# Patient Record
Sex: FEMALE | Race: WHITE | Employment: OTHER | URBAN - METROPOLITAN AREA
[De-identification: names, ages, dates, MRNs, and addresses within clinical notes are randomized per-mention and may not be internally consistent; named-entity substitution may affect disease eponyms.]

---

## 2017-12-05 ENCOUNTER — APPOINTMENT (OUTPATIENT)
Dept: CT IMAGING | Age: 47
End: 2017-12-05
Payer: COMMERCIAL

## 2017-12-05 ENCOUNTER — HOSPITAL ENCOUNTER (EMERGENCY)
Age: 47
Discharge: HOME | End: 2017-12-05
Attending: EMERGENCY MEDICINE
Payer: COMMERCIAL

## 2017-12-05 VITALS
TEMPERATURE: 97 F | SYSTOLIC BLOOD PRESSURE: 175 MMHG | DIASTOLIC BLOOD PRESSURE: 103 MMHG | WEIGHT: 293 LBS | OXYGEN SATURATION: 96 % | HEART RATE: 89 BPM | BODY MASS INDEX: 44.41 KG/M2 | HEIGHT: 68 IN | RESPIRATION RATE: 18 BRPM

## 2017-12-05 DIAGNOSIS — G89.29 CHRONIC LOW BACK PAIN, UNSPECIFIED BACK PAIN LATERALITY, WITH SCIATICA PRESENCE UNSPECIFIED: ICD-10-CM

## 2017-12-05 DIAGNOSIS — M54.5 CHRONIC LOW BACK PAIN, UNSPECIFIED BACK PAIN LATERALITY, WITH SCIATICA PRESENCE UNSPECIFIED: ICD-10-CM

## 2017-12-05 DIAGNOSIS — M62.838 MUSCLE SPASMS OF BOTH LOWER EXTREMITIES: Primary | ICD-10-CM

## 2017-12-05 LAB
ABSOLUTE EOS #: 3.39 K/UL (ref 0–0.4)
ABSOLUTE IMMATURE GRANULOCYTE: 0 K/UL (ref 0–0.3)
ABSOLUTE LYMPH #: 3.61 K/UL (ref 1–4.8)
ABSOLUTE MONO #: 0.57 K/UL (ref 0.1–0.8)
ANION GAP SERPL CALCULATED.3IONS-SCNC: 9 MMOL/L (ref 9–17)
BASOPHILS # BLD: 2 % (ref 0–2)
BASOPHILS ABSOLUTE: 0.23 K/UL (ref 0–0.2)
BUN BLDV-MCNC: 6 MG/DL (ref 6–20)
BUN/CREAT BLD: NORMAL (ref 9–20)
CALCIUM SERPL-MCNC: 8.8 MG/DL (ref 8.6–10.4)
CHLORIDE BLD-SCNC: 103 MMOL/L (ref 98–107)
CO2: 25 MMOL/L (ref 20–31)
CREAT SERPL-MCNC: 0.61 MG/DL (ref 0.5–0.9)
DIFFERENTIAL TYPE: ABNORMAL
EOSINOPHILS RELATIVE PERCENT: 30 % (ref 1–4)
GFR AFRICAN AMERICAN: >60 ML/MIN
GFR NON-AFRICAN AMERICAN: >60 ML/MIN
GFR SERPL CREATININE-BSD FRML MDRD: NORMAL ML/MIN/{1.73_M2}
GFR SERPL CREATININE-BSD FRML MDRD: NORMAL ML/MIN/{1.73_M2}
GLUCOSE BLD-MCNC: 89 MG/DL (ref 70–99)
HCG QUALITATIVE: NEGATIVE
HCT VFR BLD CALC: 43.5 % (ref 36.3–47.1)
HEMOGLOBIN: 14.3 G/DL (ref 11.9–15.1)
IMMATURE GRANULOCYTES: 0 %
LITHIUM DATE LAST DOSE: NORMAL
LITHIUM DOSE AMOUNT: NORMAL
LITHIUM DOSE TIME: NORMAL
LITHIUM LEVEL: 0.7 MMOL/L (ref 0.6–1.2)
LYMPHOCYTES # BLD: 32 % (ref 24–44)
MCH RBC QN AUTO: 30.8 PG (ref 25.2–33.5)
MCHC RBC AUTO-ENTMCNC: 32.9 G/DL (ref 28.4–34.8)
MCV RBC AUTO: 93.5 FL (ref 82.6–102.9)
MONOCYTES # BLD: 5 % (ref 1–7)
MORPHOLOGY: NORMAL
PDW BLD-RTO: 12.8 % (ref 11.8–14.4)
PLATELET # BLD: 288 K/UL (ref 138–453)
PLATELET ESTIMATE: ABNORMAL
PMV BLD AUTO: 10.7 FL (ref 8.1–13.5)
POTASSIUM SERPL-SCNC: 3.8 MMOL/L (ref 3.7–5.3)
RBC # BLD: 4.65 M/UL (ref 3.95–5.11)
RBC # BLD: ABNORMAL 10*6/UL
SEG NEUTROPHILS: 31 % (ref 36–66)
SEGMENTED NEUTROPHILS ABSOLUTE COUNT: 3.5 K/UL (ref 1.8–7.7)
SODIUM BLD-SCNC: 137 MMOL/L (ref 135–144)
WBC # BLD: 11.3 K/UL (ref 3.5–11.3)
WBC # BLD: ABNORMAL 10*3/UL

## 2017-12-05 PROCEDURE — 6370000000 HC RX 637 (ALT 250 FOR IP): Performed by: EMERGENCY MEDICINE

## 2017-12-05 PROCEDURE — 80048 BASIC METABOLIC PNL TOTAL CA: CPT

## 2017-12-05 PROCEDURE — 72131 CT LUMBAR SPINE W/O DYE: CPT

## 2017-12-05 PROCEDURE — 80178 ASSAY OF LITHIUM: CPT

## 2017-12-05 PROCEDURE — G0383 LEV 4 HOSP TYPE B ED VISIT: HCPCS

## 2017-12-05 PROCEDURE — 85025 COMPLETE CBC W/AUTO DIFF WBC: CPT

## 2017-12-05 PROCEDURE — 84703 CHORIONIC GONADOTROPIN ASSAY: CPT

## 2017-12-05 RX ORDER — CYCLOBENZAPRINE HCL 10 MG
10 TABLET ORAL 3 TIMES DAILY PRN
Qty: 15 TABLET | Refills: 0 | Status: SHIPPED | OUTPATIENT
Start: 2017-12-05 | End: 2017-12-15

## 2017-12-05 RX ORDER — ACETAMINOPHEN 500 MG
1000 TABLET ORAL ONCE
Status: COMPLETED | OUTPATIENT
Start: 2017-12-05 | End: 2017-12-05

## 2017-12-05 RX ADMIN — ACETAMINOPHEN 1000 MG: 500 TABLET ORAL at 15:09

## 2017-12-05 ASSESSMENT — PAIN DESCRIPTION - ORIENTATION: ORIENTATION: RIGHT;LEFT

## 2017-12-05 ASSESSMENT — ENCOUNTER SYMPTOMS
VOMITING: 0
NAUSEA: 0
COLOR CHANGE: 0
BACK PAIN: 1
SHORTNESS OF BREATH: 0

## 2017-12-05 ASSESSMENT — PAIN SCALES - GENERAL
PAINLEVEL_OUTOF10: 10
PAINLEVEL_OUTOF10: 9

## 2017-12-05 ASSESSMENT — PAIN DESCRIPTION - LOCATION: LOCATION: LEG;HIP

## 2017-12-05 NOTE — ED PROVIDER NOTES
[address] [facility]  ED  Emergency Department Encounter  Emergency Medicine Resident     Pt Name: Nehemias Parra  MRN: [id number]  [place] 1970  Date of evaluation: 12/5/17  PCP:  Julia No    CHIEF COMPLAINT       Lower extremity spasm  Back pain    HISTORY OF PRESENT ILLNESS  (Location/Symptom, Timing/Onset, Context/Setting, Quality, Duration, Modifying Factors, Severity.)      Nehemias Parra is a 52 y.o. female who presents Complaining of back pain radiating into the bilateral lower extremities. Denies any numbness or weakness. Denies any difficulty with ambulation. Denies any loss of perirectal sensation or bowel or bladder incontinence or retention. No fevers. No history of IV drug use. Denies any trauma. States at night she will have bilateral leg spasms and her reason for presenting today as her symptoms of an ongoing for several weeks is that it is beginning to affect her during the day while she is at work. Blood pressure is 175/103 on arrival.  She states that she only uses her antihypertensives when necessary. I recommended them to her here and she declines stating she will take when she gets home    [facility] / SURGICAL / SOCIAL / FAMILY HISTORY      has a past medical history of Acute exacerbation of COPD with asthma ([facility] [address] Anxiety; Asthma; Benign essential HTN; Bipolar 1 disorder ([facility] [address] COPD (chronic obstructive pulmonary disease) ([facility] [address] Depression; Headache(784.0); Pneumonia due to infectious organism; and Post traumatic stress disorder. has a past surgical history that includes Tonsillectomy and Cholecystectomy. Social History     Social History    Marital status:      Spouse name: N/A    Number of children: N/A    Years of education: N/A     Occupational History    Not on file.      Social History Main Topics    Smoking status: Former Smoker     Packs/day: 2.00     Years: 20.00     Types: Cigarettes     Quit date: 1/10/2010    Smokeless extremities and is able to stand up and stand on her tiptoes without assistance   Skin: Skin is warm and dry. Vitals reviewed. DIFFERENTIAL  DIAGNOSIS     PLAN (LABS / IMAGING / EKG):  Orders Placed This Encounter   Procedures    CT LUMBAR SPINE WO CONTRAST    BASIC METABOLIC PANEL    CBC WITH AUTO DIFFERENTIAL    HCG Qualitative, Serum    Lithium Level     If the patient was admitted, some of the above orders may have been placed by the admitting team(s) and were auto populated above when I refreshed my note prior to signing it. If there is any question, please check for the responsible provider for individual orders in the EHR system. MEDICATIONS ORDERED:  Orders Placed This Encounter   Medications    acetaminophen (TYLENOL) tablet 1,000 mg    cyclobenzaprine (FLEXERIL) 10 MG tablet     Sig: Take 1 tablet by mouth 3 times daily as needed for Muscle spasms     Dispense:  15 tablet     Refill:  0     If the patient was admitted, some of the above orders may have been placed by the admitting team(s) and were auto populated above when I refreshed my note prior to signing it. If there is any question, please check for the responsible provider for individual orders in the EHR system. DDX: Spasm, low back pain;  Low concern for cauda equina syndrome/ spinal cord compression, abdominal aortic aneurysm, epidural abscess based on signs and symptoms, history and physical as documented above    DIAGNOSTIC RESULTS / EMERGENCY DEPARTMENT COURSE / MDM     LABS:  Labs Reviewed   CBC WITH AUTO DIFFERENTIAL - Abnormal; Notable for the following:        Result Value    Seg Neutrophils 31 (*)     Eosinophils % 30 (*)     Absolute Eos # 3.39 (*)     Basophils # 0.23 (*)     All other components within normal limits   BASIC METABOLIC PANEL   HCG, SERUM, QUALITATIVE   LITHIUM LEVEL     If the patient was admitted, some of the above labs may have been ordered by the admitting team(s) and were auto populated above when I refreshed my note prior to signing it. If there is any question, please check for the responsible provider for individual orders in the EHR system. Additionally, some of the above labs results may still be pending. RADIOLOGY:  All forms of imaging other than ED bedside ultrasounds are viewed and interpreted by a radiologist and their interpretations are displayed below. Ct Lumbar Spine Wo Contrast    Result Date: 12/5/2017  EXAMINATION: CT OF THE LUMBAR SPINE WITHOUT CONTRAST  12/5/2017 TECHNIQUE: CT of the lumbar spine was performed without the administration of intravenous contrast. Multiplanar reformatted images are provided for review. Dose modulation, iterative reconstruction, and/or weight based adjustment of the mA/kV was utilized to reduce the radiation dose to as low as reasonably achievable. COMPARISON: None HISTORY: ORDERING SYSTEM PROVIDED HISTORY: low back pain, spasms TECHNOLOGIST PROVIDED HISTORY: Reason for exam:->low back pain, spasms Acuity: Unknown Type of Exam: Unknown FINDINGS: BONES/ALIGNMENT: There is normal alignment of the spine. The vertebral body heights are maintained. No osseous destructive lesion is seen. DEGENERATIVE CHANGES: Advanced facet arthropathy within the L4-L5 and L5-S1 levels. Mild degenerative endplate spurring of the mid lumbar spine. SOFT TISSUES/RETROPERITONEUM: No paraspinal mass is seen. Mild degenerative changes of the lumbar spine. No acute fracture or malalignment. EMERGENCY DEPARTMENT COURSE AND MEDICAL DECISION MAKING:  ED Course      51-year-old female with leg spasms and low back pain. CT demonstrating only mild degenerative changes of the lumbar spine. As she is neurologically intact completely with symmetric strength and sensory on my exam no indication for MRI here today.   I did prescribe her muscle relaxers and encouraged close follow-up with her PCP and return back if she does develop any numbness or weakness bowel or bladder incontinence or has any other new symptoms or concerns    PROCEDURES:  None     CONSULTS:  None  If the patient was admitted, some of the above consults may have been placed by the admitting team(s) and were auto populated above when I refreshed my note prior to signing it. If there is any question, please check for the responsible provider for individual orders in the EHR system. CRITICAL CARE:  None     FINAL IMPRESSION      1. Muscle spasms of both lower extremities    2. Chronic low back pain, unspecified back pain laterality, with sciatica presence unspecified             DISPOSITION / PLAN     DISPOSITION Decision to Discharge     If discharged, the patient was instructed to return to the emergency department with any worsening symptoms, if new symptoms arise, or if they have any other concerns. Patient was instructed not to drive home if discharged today and received pain medications or other mind-altering medications while here. Pre-hypertension/Hypertension: In the case that there was an elevated blood pressure reading for this patient today in the emergency department, the patient was informed that he or she may have pre-hypertension or hypertension. It was recommended to the patient to call the primary care provider listed in the discharge instructions or a physician of the patient's choice this week to arrange follow up for further evaluation of possible pre-hypertension or hypertension. PATIENT REFERRED TO:  [place] [id number]  [phone]    Schedule an appointment as soon as possible for a visit         DISCHARGE MEDICATIONS:  Discharge Medication List as of 12/5/2017  3:15 PM      START taking these medications    Details   cyclobenzaprine (FLEXERIL) 10 MG tablet Take 1 tablet by mouth 3 times daily as needed for Muscle spasms, Disp-15 tablet, R-0Print             Alejandro WOLFF   Emergency Medicine Resident Physician    (Please note that portions of this note were completed with a voice recognition program.  Efforts were made to edit the dictations but occasionally words are mis-transcribed.)     Jordan Tyson,   Resident  12/06/17 3089

## 2017-12-05 NOTE — ED NOTES
Pt resting on stretcher, in NAD, RR even and unlabored. Pt updated on plan of care. Will continue to monitor. Call light within reach.        Danyelle Maurer RN  12/05/17 1552

## 2017-12-06 NOTE — ED PROVIDER NOTES
[facility]     Emergency Department     Faculty Attestation    I performed a history and physical examination of the patient and discussed management with the resident. I reviewed the residents note and agree with the documented findings and plan of care. Any areas of disagreement are noted on the chart. I was personally present for the key portions of any procedures. I have documented in the chart those procedures where I was not present during the key portions. I have reviewed the emergency nurses triage note. I agree with the chief complaint, past medical history, past surgical history, allergies, medications, social and family history as documented unless otherwise noted below. For Physician Assistant/ Nurse Practitioner cases/documentation I have personally evaluated this patient and have completed at least one if not all key elements of the E/M (history, physical exam, and MDM). Additional findings are as noted. I have personally seen and evaluated the patient. I find the patient's history and physical exam are consistent with the NP/PA documentation. I agree with the care provided, treatment rendered, disposition and follow-up plan. Critical Care     Chi Curiel M.D.   Attending Emergency  Physician             Michael Tobias MD  12/06/17 2078

## 2018-01-08 ENCOUNTER — HOSPITAL ENCOUNTER (EMERGENCY)
Age: 48
Discharge: HOME | End: 2018-01-08
Attending: EMERGENCY MEDICINE
Payer: COMMERCIAL

## 2018-01-08 ENCOUNTER — APPOINTMENT (OUTPATIENT)
Dept: GENERAL RADIOLOGY | Age: 48
End: 2018-01-08
Payer: COMMERCIAL

## 2018-01-08 VITALS
OXYGEN SATURATION: 96 % | HEART RATE: 97 BPM | TEMPERATURE: 98.1 F | WEIGHT: 293 LBS | RESPIRATION RATE: 17 BRPM | SYSTOLIC BLOOD PRESSURE: 171 MMHG | DIASTOLIC BLOOD PRESSURE: 92 MMHG | BODY MASS INDEX: 50.61 KG/M2

## 2018-01-08 DIAGNOSIS — J06.9 UPPER RESPIRATORY TRACT INFECTION, UNSPECIFIED TYPE: Primary | ICD-10-CM

## 2018-01-08 DIAGNOSIS — J44.1 COPD EXACERBATION (HCC): ICD-10-CM

## 2018-01-08 PROCEDURE — 71046 X-RAY EXAM CHEST 2 VIEWS: CPT

## 2018-01-08 PROCEDURE — 6370000000 HC RX 637 (ALT 250 FOR IP): Performed by: EMERGENCY MEDICINE

## 2018-01-08 PROCEDURE — 99283 EMERGENCY DEPT VISIT LOW MDM: CPT

## 2018-01-08 RX ORDER — BENZONATATE 100 MG/1
100 CAPSULE ORAL ONCE
Status: COMPLETED | OUTPATIENT
Start: 2018-01-08 | End: 2018-01-08

## 2018-01-08 RX ORDER — LEVOFLOXACIN 500 MG/1
750 TABLET, FILM COATED ORAL DAILY
Qty: 15 TABLET | Refills: 0 | Status: SHIPPED | OUTPATIENT
Start: 2018-01-08 | End: 2018-01-18

## 2018-01-08 RX ORDER — PREDNISONE 10 MG/1
TABLET ORAL
Qty: 16 TABLET | Refills: 0 | Status: ON HOLD | OUTPATIENT
Start: 2018-01-08 | End: 2018-08-06 | Stop reason: ALTCHOICE

## 2018-01-08 RX ORDER — PREDNISONE 20 MG/1
40 TABLET ORAL ONCE
Status: COMPLETED | OUTPATIENT
Start: 2018-01-08 | End: 2018-01-08

## 2018-01-08 RX ORDER — BENZONATATE 100 MG/1
100 CAPSULE ORAL 3 TIMES DAILY PRN
Qty: 30 CAPSULE | Refills: 0 | Status: SHIPPED | OUTPATIENT
Start: 2018-01-08 | End: 2018-01-15

## 2018-01-08 RX ORDER — LEVOFLOXACIN 750 MG/1
750 TABLET ORAL ONCE
Status: DISCONTINUED | OUTPATIENT
Start: 2018-01-08 | End: 2018-01-08 | Stop reason: HOSPADM

## 2018-01-08 RX ORDER — METHYLPREDNISOLONE SODIUM SUCCINATE 125 MG/2ML
125 INJECTION, POWDER, LYOPHILIZED, FOR SOLUTION INTRAMUSCULAR; INTRAVENOUS ONCE
Status: DISCONTINUED | OUTPATIENT
Start: 2018-01-08 | End: 2018-01-08

## 2018-01-08 RX ADMIN — BENZONATATE 100 MG: 100 CAPSULE ORAL at 13:23

## 2018-01-08 RX ADMIN — PREDNISONE 40 MG: 20 TABLET ORAL at 13:23

## 2018-01-08 ASSESSMENT — ENCOUNTER SYMPTOMS
VOMITING: 0
NAUSEA: 0
TROUBLE SWALLOWING: 0
ABDOMINAL PAIN: 0
SORE THROAT: 0
COUGH: 1

## 2018-01-08 NOTE — ED PROVIDER NOTES
exacerbation    Plan: Aerosols, steroids, chest x-ray, antibiotics      Pre-hypertension/Hypertension: The patient has been informed that they may have pre-hypertension or Hypertension based on a blood pressure reading in the emergency department. I recommend that the patient call the primary care provider listed on their discharge instructions or a physician of their choice this week to arrange follow up for further evaluation of possible pre-hypertension or Hypertension.       Jillian Chun MD  Attending Emergency Physician        Radha Sanderson MD  01/08/18 0601

## 2018-01-08 NOTE — ED PROVIDER NOTES
on file     Other Topics Concern    Not on file     Social History Narrative    No narrative on file       Family History   Problem Relation Age of Onset    Hypertension Mother     Hypertension Father     Diabetes Other      cousin       Allergies:  Food; Penicillins; Parafon forte dsc [chlorzoxazone]; and Biaxin [clarithromycin]    Home Medications:  Prior to Admission medications    Medication Sig Start Date End Date Taking? Authorizing Provider   benzonatate (TESSALON PERLES) 100 MG capsule Take 1 capsule by mouth 3 times daily as needed for Cough 1/8/18 1/15/18 Yes Mallory Reyez MD   levofloxacin (LEVAQUIN) 500 MG tablet Take 1.5 tablets by mouth daily for 10 days 1/8/18 1/18/18 Yes Mallory Reyez MD   predniSONE (DELTASONE) 10 MG tablet Four tablets per day (total of 40mg) for the next four days. Please take the first dose Tuesday, Jan 9th. 1/8/18  Yes Mallory Reyez MD   naproxen (NAPROSYN) 500 MG tablet Take 500 mg by mouth 2 times daily (with meals)    Historical Provider, MD   albuterol (PROVENTIL) (2.5 MG/3ML) 0.083% nebulizer solution Take 3 mLs by nebulization every 4 hours as needed for Wheezing 7/11/16   Sanjay Penny MD   amLODIPine (NORVASC) 5 MG tablet Take 1 tablet by mouth daily 7/11/16   Sanjay Penny MD   hydrochlorothiazide (MICROZIDE) 12.5 MG capsule Take 1 capsule by mouth daily 7/11/16   Sanjay Penny MD   Tiotropium Bromide Monohydrate 2.5 MCG/ACT AERS Inhale 2 puffs into the lungs daily 6/8/16   Geraldean Goodell, NP   omeprazole (PRILOSEC) 20 MG capsule Take 20 mg by mouth daily    Historical Provider, MD   carvedilol (COREG) 6.25 MG tablet Take 1 tablet by mouth 2 times daily (with meals). 10/25/14   Genaro Martino MD   aspirin (ASPIRIN CHILDRENS) 81 MG chewable tablet Take 1 tablet by mouth daily. 10/25/14   Cristian Hernandez MD   topiramate (TOPAMAX) 25 MG tablet Take 50 mg by mouth 2 times daily.     Historical Provider, MD

## 2018-02-27 ENCOUNTER — HOSPITAL ENCOUNTER (EMERGENCY)
Age: 48
Discharge: HOME | End: 2018-02-27
Attending: EMERGENCY MEDICINE
Payer: COMMERCIAL

## 2018-02-27 ENCOUNTER — APPOINTMENT (OUTPATIENT)
Dept: GENERAL RADIOLOGY | Age: 48
End: 2018-02-27
Payer: COMMERCIAL

## 2018-02-27 VITALS
WEIGHT: 292 LBS | OXYGEN SATURATION: 94 % | SYSTOLIC BLOOD PRESSURE: 157 MMHG | DIASTOLIC BLOOD PRESSURE: 100 MMHG | HEIGHT: 67 IN | TEMPERATURE: 97.2 F | BODY MASS INDEX: 45.83 KG/M2 | RESPIRATION RATE: 20 BRPM | HEART RATE: 97 BPM

## 2018-02-27 DIAGNOSIS — J44.1 COPD EXACERBATION (HCC): Primary | ICD-10-CM

## 2018-02-27 LAB
ABSOLUTE EOS #: 1.91 K/UL (ref 0–0.44)
ABSOLUTE IMMATURE GRANULOCYTE: <0.03 K/UL (ref 0–0.3)
ABSOLUTE LYMPH #: 1.83 K/UL (ref 1.1–3.7)
ABSOLUTE MONO #: 0.81 K/UL (ref 0.1–1.2)
ANION GAP SERPL CALCULATED.3IONS-SCNC: 13 MMOL/L (ref 9–17)
BASOPHILS # BLD: 1 % (ref 0–2)
BASOPHILS ABSOLUTE: 0.06 K/UL (ref 0–0.2)
BNP INTERPRETATION: NORMAL
BUN BLDV-MCNC: 7 MG/DL (ref 6–20)
BUN/CREAT BLD: ABNORMAL (ref 9–20)
CALCIUM SERPL-MCNC: 9.6 MG/DL (ref 8.6–10.4)
CHLORIDE BLD-SCNC: 102 MMOL/L (ref 98–107)
CO2: 23 MMOL/L (ref 20–31)
CREAT SERPL-MCNC: 0.53 MG/DL (ref 0.5–0.9)
DIFFERENTIAL TYPE: ABNORMAL
EKG ATRIAL RATE: 102 BPM
EKG P AXIS: 52 DEGREES
EKG P-R INTERVAL: 144 MS
EKG Q-T INTERVAL: 362 MS
EKG QRS DURATION: 86 MS
EKG QTC CALCULATION (BAZETT): 471 MS
EKG R AXIS: 53 DEGREES
EKG T AXIS: 49 DEGREES
EKG VENTRICULAR RATE: 102 BPM
EOSINOPHILS RELATIVE PERCENT: 20 % (ref 1–4)
GFR AFRICAN AMERICAN: >60 ML/MIN
GFR NON-AFRICAN AMERICAN: >60 ML/MIN
GFR SERPL CREATININE-BSD FRML MDRD: ABNORMAL ML/MIN/{1.73_M2}
GFR SERPL CREATININE-BSD FRML MDRD: ABNORMAL ML/MIN/{1.73_M2}
GLUCOSE BLD-MCNC: 105 MG/DL (ref 70–99)
HCT VFR BLD CALC: 46.4 % (ref 36.3–47.1)
HEMOGLOBIN: 15.4 G/DL (ref 11.9–15.1)
IMMATURE GRANULOCYTES: 0 %
LYMPHOCYTES # BLD: 19 % (ref 24–43)
MCH RBC QN AUTO: 30.3 PG (ref 25.2–33.5)
MCHC RBC AUTO-ENTMCNC: 33.2 G/DL (ref 28.4–34.8)
MCV RBC AUTO: 91.2 FL (ref 82.6–102.9)
MONOCYTES # BLD: 9 % (ref 3–12)
NRBC AUTOMATED: 0 PER 100 WBC
PDW BLD-RTO: 12.8 % (ref 11.8–14.4)
PLATELET # BLD: 230 K/UL (ref 138–453)
PLATELET ESTIMATE: ABNORMAL
PMV BLD AUTO: 10.8 FL (ref 8.1–13.5)
POC TROPONIN I: 0 NG/ML (ref 0–0.1)
POC TROPONIN I: 0 NG/ML (ref 0–0.1)
POC TROPONIN INTERP: NORMAL
POC TROPONIN INTERP: NORMAL
POTASSIUM SERPL-SCNC: 4 MMOL/L (ref 3.7–5.3)
PRO-BNP: 31 PG/ML
RBC # BLD: 5.09 M/UL (ref 3.95–5.11)
RBC # BLD: ABNORMAL 10*6/UL
SEG NEUTROPHILS: 51 % (ref 36–65)
SEGMENTED NEUTROPHILS ABSOLUTE COUNT: 4.78 K/UL (ref 1.5–8.1)
SODIUM BLD-SCNC: 138 MMOL/L (ref 135–144)
WBC # BLD: 9.4 K/UL (ref 3.5–11.3)
WBC # BLD: ABNORMAL 10*3/UL

## 2018-02-27 PROCEDURE — 94640 AIRWAY INHALATION TREATMENT: CPT

## 2018-02-27 PROCEDURE — 83880 ASSAY OF NATRIURETIC PEPTIDE: CPT

## 2018-02-27 PROCEDURE — 93005 ELECTROCARDIOGRAM TRACING: CPT

## 2018-02-27 PROCEDURE — 85025 COMPLETE CBC W/AUTO DIFF WBC: CPT

## 2018-02-27 PROCEDURE — 71046 X-RAY EXAM CHEST 2 VIEWS: CPT

## 2018-02-27 PROCEDURE — 94664 DEMO&/EVAL PT USE INHALER: CPT

## 2018-02-27 PROCEDURE — 84484 ASSAY OF TROPONIN QUANT: CPT

## 2018-02-27 PROCEDURE — 6370000000 HC RX 637 (ALT 250 FOR IP): Performed by: EMERGENCY MEDICINE

## 2018-02-27 PROCEDURE — 96374 THER/PROPH/DIAG INJ IV PUSH: CPT

## 2018-02-27 PROCEDURE — 6360000002 HC RX W HCPCS: Performed by: EMERGENCY MEDICINE

## 2018-02-27 PROCEDURE — 99285 EMERGENCY DEPT VISIT HI MDM: CPT

## 2018-02-27 PROCEDURE — 80048 BASIC METABOLIC PNL TOTAL CA: CPT

## 2018-02-27 RX ORDER — METHYLPREDNISOLONE SODIUM SUCCINATE 125 MG/2ML
125 INJECTION, POWDER, LYOPHILIZED, FOR SOLUTION INTRAMUSCULAR; INTRAVENOUS ONCE
Status: COMPLETED | OUTPATIENT
Start: 2018-02-27 | End: 2018-02-27

## 2018-02-27 RX ORDER — ALBUTEROL SULFATE 90 UG/1
2 AEROSOL, METERED RESPIRATORY (INHALATION)
Status: DISCONTINUED | OUTPATIENT
Start: 2018-02-27 | End: 2018-02-27

## 2018-02-27 RX ORDER — BENZONATATE 100 MG/1
100 CAPSULE ORAL 3 TIMES DAILY PRN
Qty: 30 CAPSULE | Refills: 0 | Status: SHIPPED | OUTPATIENT
Start: 2018-02-27 | End: 2018-03-02 | Stop reason: DRUGHIGH

## 2018-02-27 RX ORDER — LITHIUM CARBONATE 300 MG
300 TABLET ORAL 2 TIMES DAILY
COMMUNITY
End: 2021-06-29

## 2018-02-27 RX ORDER — ALBUTEROL SULFATE 2.5 MG/3ML
5 SOLUTION RESPIRATORY (INHALATION)
Status: DISCONTINUED | OUTPATIENT
Start: 2018-02-27 | End: 2018-02-27

## 2018-02-27 RX ORDER — ALBUTEROL SULFATE 90 UG/1
2 AEROSOL, METERED RESPIRATORY (INHALATION)
Status: DISCONTINUED | OUTPATIENT
Start: 2018-02-27 | End: 2018-02-27 | Stop reason: HOSPADM

## 2018-02-27 RX ORDER — IPRATROPIUM BROMIDE AND ALBUTEROL SULFATE 2.5; .5 MG/3ML; MG/3ML
1 SOLUTION RESPIRATORY (INHALATION)
Status: DISCONTINUED | OUTPATIENT
Start: 2018-02-27 | End: 2018-02-27

## 2018-02-27 RX ORDER — LEVOFLOXACIN 750 MG/1
750 TABLET ORAL DAILY
Qty: 7 TABLET | Refills: 0 | Status: SHIPPED | OUTPATIENT
Start: 2018-02-27 | End: 2018-03-06

## 2018-02-27 RX ORDER — PREDNISONE 50 MG/1
50 TABLET ORAL DAILY
Qty: 4 TABLET | Refills: 0 | Status: ON HOLD | OUTPATIENT
Start: 2018-02-27 | End: 2018-08-06 | Stop reason: ALTCHOICE

## 2018-02-27 RX ORDER — LEVOFLOXACIN 750 MG/1
750 TABLET ORAL ONCE
Status: COMPLETED | OUTPATIENT
Start: 2018-02-27 | End: 2018-02-27

## 2018-02-27 RX ADMIN — ALBUTEROL SULFATE 5 MG: 5 SOLUTION RESPIRATORY (INHALATION) at 11:12

## 2018-02-27 RX ADMIN — IPRATROPIUM BROMIDE 0.5 MG: 0.5 SOLUTION RESPIRATORY (INHALATION) at 11:12

## 2018-02-27 RX ADMIN — METHYLPREDNISOLONE SODIUM SUCCINATE 125 MG: 125 INJECTION, POWDER, FOR SOLUTION INTRAMUSCULAR; INTRAVENOUS at 10:25

## 2018-02-27 RX ADMIN — LEVOFLOXACIN 750 MG: 750 TABLET, FILM COATED ORAL at 13:31

## 2018-02-27 RX ADMIN — ALBUTEROL SULFATE 5 MG: 5 SOLUTION RESPIRATORY (INHALATION) at 12:25

## 2018-02-27 ASSESSMENT — ENCOUNTER SYMPTOMS
VOMITING: 0
ABDOMINAL PAIN: 0
RHINORRHEA: 0
EYE PAIN: 0
SHORTNESS OF BREATH: 0
COUGH: 1
COLOR CHANGE: 0
NAUSEA: 0
SORE THROAT: 0

## 2018-02-27 NOTE — ED NOTES
Pt to ed from home. Pt states symptoms started Sunday. Pt states she is sob, unable to lay flat, has productive cough and has been using her inhalers and nebulizer without relief. Pt also states her oxygen levels have been as low as 88%. Pt is alert, oriented, speaking in complete sentences, no distress noted.  Pt denies any chest pain     Williams Flores RN  02/27/18 6052

## 2018-02-27 NOTE — ED PROVIDER NOTES
Comment: Socially    Drug use: No    Sexual activity: Not on file     Other Topics Concern    Not on file     Social History Narrative    No narrative on file       Family History   Problem Relation Age of Onset    Hypertension Mother     Hypertension Father     Diabetes Other      cousin       Allergies:  Food; Penicillins; Parafon forte dsc [chlorzoxazone]; and Biaxin [clarithromycin]    Home Medications:  Prior to Admission medications    Medication Sig Start Date End Date Taking? Authorizing Provider   lithium 300 MG tablet Take 300 mg by mouth 3 times daily 450 mg am 900 mg pm   Yes Historical Provider, MD   benzonatate (TESSALON PERLES) 100 MG capsule Take 1 capsule by mouth 3 times daily as needed for Cough 2/27/18 3/6/18 Yes Tramaine Galicia MD   predniSONE (DELTASONE) 50 MG tablet Take 1 tablet by mouth daily 2/27/18  Yes Tramaine Galicia MD   levofloxacin (LEVAQUIN) 750 MG tablet Take 1 tablet by mouth daily for 7 days 2/27/18 3/6/18 Yes Tramaine Galicia MD   albuterol (PROVENTIL) (2.5 MG/3ML) 0.083% nebulizer solution Take 3 mLs by nebulization every 4 hours as needed for Wheezing 7/11/16  Yes Christian Amaral MD   Tiotropium Bromide Monohydrate 2.5 MCG/ACT AERS Inhale 2 puffs into the lungs daily 6/8/16  Yes Marisela Rodriguez NP   albuterol (PROVENTIL HFA) 108 (90 BASE) MCG/ACT inhaler Inhale 1-2 puffs into the lungs every 4 hours as needed for Wheezing or Shortness of Breath (Space out to every 6 hours as symptoms improve). Space out to every 6 hours as symptoms improve. 9/28/14  Yes Cathy Xavier MD   ALPRAZolam [place]) 0.5 MG tablet Take 0.5 mg by mouth nightly as needed for Sleep. Yes Historical Provider, MD   budesonide-formoterol (SYMBICORT) 160-4.5 MCG/ACT AERO Inhale 2 puffs into the lungs 2 times daily. Yes Historical Provider, MD   predniSONE (DELTASONE) 10 MG tablet Four tablets per day (total of 40mg) for the next four days. Please take the first dose Tuesday, Jan 9th.  1/8/18 Nereyda Oropeza MD   naproxen (NAPROSYN) 500 MG tablet Take 500 mg by mouth 2 times daily (with meals)    Historical Provider, MD   amLODIPine (NORVASC) 5 MG tablet Take 1 tablet by mouth daily 7/11/16   Vinod Sheridan MD   hydrochlorothiazide (MICROZIDE) 12.5 MG capsule Take 1 capsule by mouth daily 7/11/16   Vinod Sheridan MD   omeprazole (PRILOSEC) 20 MG capsule Take 20 mg by mouth daily    Historical Provider, MD   carvedilol (COREG) 6.25 MG tablet Take 1 tablet by mouth 2 times daily (with meals). 10/25/14   Radhika Villarreal MD   aspirin (ASPIRIN CHILDRENS) 81 MG chewable tablet Take 1 tablet by mouth daily. 10/25/14   Cristian Hernandez MD   topiramate (TOPAMAX) 25 MG tablet Take 50 mg by mouth 2 times daily. Historical Provider, MD       REVIEW OF SYSTEMS    (2-9 systems for level 4, 10 or more for level 5)      Review of Systems   Constitutional: Negative for chills and fever. HENT: Negative for rhinorrhea and sore throat. Eyes: Negative for pain and visual disturbance. Respiratory: Positive for cough. Negative for shortness of breath. Cardiovascular: Negative for chest pain and palpitations. Gastrointestinal: Negative for abdominal pain, nausea and vomiting. Genitourinary: Negative for difficulty urinating and dysuria. Musculoskeletal: Negative for arthralgias and myalgias. Skin: Negative for color change and wound. Neurological: Negative for weakness, numbness and headaches. Psychiatric/Behavioral: Negative for behavioral problems and dysphoric mood. PHYSICAL EXAM   (up to 7 for level 4, 8 or more for level 5)      INITIAL VITALS:   BP (!) 157/100   Pulse 97   Temp 97.2 °F (36.2 °C) (Oral)   Resp 20   Ht 5' 7\" (1.702 m)   Wt 292 lb (132.5 kg)   LMP 02/27/2015   SpO2 94%   BMI 45.73 kg/m²     Physical Exam   Constitutional: She is oriented to person, place, and time. She appears well-developed and well-nourished. No distress.    HENT:   Head: Normocephalic and atraumatic. Mouth/Throat: Oropharynx is clear and moist.   Eyes: EOM are normal. Pupils are equal, round, and reactive to light. Neck: Normal range of motion. Cardiovascular: Normal rate and regular rhythm. Pulmonary/Chest: Effort normal. No respiratory distress. She has wheezes. She has no rales. Scattered respiratory and expiratory wheezes; no rhonchi or crackles heard   Abdominal: Soft. There is no tenderness. There is no rebound and no guarding. Musculoskeletal: Normal range of motion. Neurological: She is alert and oriented to person, place, and time. She has normal strength. GCS eye subscore is 4. GCS verbal subscore is 5. GCS motor subscore is 6. Skin: Skin is warm and dry.    Psychiatric: Her behavior is normal.       DIFFERENTIAL  DIAGNOSIS     PLAN (LABS / IMAGING / EKG):  Orders Placed This Encounter   Procedures    XR CHEST STANDARD (2 VW)    CBC Auto Differential    Basic Metabolic Panel    Brain Natriuretic Peptide    Initiate ED Aerosol Protocol    HHN Treatment    MDI Treatment    POCT troponin    POCT troponin    POCT troponin    EKG 12 Lead       MEDICATIONS ORDERED:  Orders Placed This Encounter   Medications    methylPREDNISolone sodium (SOLU-MEDROL) injection 125 mg    albuterol (PROVENTIL) nebulizer solution 5 mg    DISCONTD: ipratropium-albuterol (DUONEB) nebulizer solution 1 ampule    DISCONTD: albuterol (PROVENTIL) nebulizer solution 5 mg    albuterol sulfate  (90 Base) MCG/ACT inhaler 2 puff    DISCONTD: albuterol sulfate  (90 Base) MCG/ACT inhaler 2 puff    DISCONTD: ipratropium (ATROVENT HFA) 17 MCG/ACT inhaler 2 puff    ipratropium (ATROVENT) 0.02 % nebulizer solution 0.5 mg    benzonatate (TESSALON PERLES) 100 MG capsule     Sig: Take 1 capsule by mouth 3 times daily as needed for Cough     Dispense:  30 capsule     Refill:  0    predniSONE (DELTASONE) 50 MG tablet     Sig: Take 1 tablet by mouth daily     Dispense:  4 tablet     Refill:  0    levofloxacin (LEVAQUIN) tablet 750 mg    levofloxacin (LEVAQUIN) 750 MG tablet     Sig: Take 1 tablet by mouth daily for 7 days     Dispense:  7 tablet     Refill:  0         DIAGNOSTIC RESULTS / EMERGENCY DEPARTMENT COURSE / MDM     LABS:  Results for orders placed or performed during the hospital encounter of 02/27/18   CBC Auto Differential   Result Value Ref Range    WBC 9.4 3.5 - 11.3 k/uL    RBC 5.09 3.95 - 5.11 m/uL    Hemoglobin 15.4 (H) 11.9 - 15.1 g/dL    Hematocrit 46.4 36.3 - 47.1 %    MCV 91.2 82.6 - 102.9 fL    MCH 30.3 25.2 - 33.5 pg    MCHC 33.2 28.4 - 34.8 g/dL    RDW 12.8 11.8 - 14.4 %    Platelets 282 588 - 867 k/uL    MPV 10.8 8.1 - 13.5 fL    NRBC Automated 0.0 0.0 per 100 WBC    Differential Type NOT REPORTED     Seg Neutrophils 51 36 - 65 %    Lymphocytes 19 (L) 24 - 43 %    Monocytes 9 3 - 12 %    Eosinophils % 20 (H) 1 - 4 %    Basophils 1 0 - 2 %    Immature Granulocytes 0 0 %    Segs Absolute 4.78 1.50 - 8.10 k/uL    Absolute Lymph # 1.83 1.10 - 3.70 k/uL    Absolute Mono # 0.81 0.10 - 1.20 k/uL    Absolute Eos # 1.91 (H) 0.00 - 0.44 k/uL    Basophils # 0.06 0.00 - 0.20 k/uL    Absolute Immature Granulocyte <0.03 0.00 - 0.30 k/uL    WBC Morphology NOT REPORTED     RBC Morphology NOT REPORTED     Platelet Estimate NOT REPORTED    Basic Metabolic Panel   Result Value Ref Range    Glucose 105 (H) 70 - 99 mg/dL    BUN 7 6 - 20 mg/dL    CREATININE 0.53 0.50 - 0.90 mg/dL    Bun/Cre Ratio NOT REPORTED 9 - 20    Calcium 9.6 8.6 - 10.4 mg/dL    Sodium 138 135 - 144 mmol/L    Potassium 4.0 3.7 - 5.3 mmol/L    Chloride 102 98 - 107 mmol/L    CO2 23 20 - 31 mmol/L    Anion Gap 13 9 - 17 mmol/L    GFR Non-African American >60 >60 mL/min    GFR African American >60 >60 mL/min    GFR Comment          GFR Staging NOT REPORTED    Brain Natriuretic Peptide   Result Value Ref Range    Pro-BNP 31 <300 pg/mL    BNP Interpretation         POCT troponin   Result Value Ref Range    POC

## 2018-02-27 NOTE — ED PROVIDER NOTES
exacerbation    Plan: Steroids, aerosols, chest x-ray, basic labs and troponin for rule out ACS. Reassess. Pulsatile discharge if symptoms improved. Pre-hypertension/Hypertension: The patient has been informed that they may have pre-hypertension or Hypertension based on a blood pressure reading in the emergency department. I recommend that the patient call the primary care provider listed on their discharge instructions or a physician of their choice this week to arrange follow up for further evaluation of possible pre-hypertension or Hypertension.       EKG Interpretation  EKG Interpretation    Interpreted by emergency department physician    Rhythm: sinus tachycardia  Rate: 100-110  Axis: normal  Ectopy: none  Conduction: normal  ST Segments: normal  T Waves: normal  Q Waves: none    EKG  Impression: sinus tachycardia    Victoria Christian MD      Interpreted by me      Killian Millan MD  Attending Emergency Physician        Victoria Christian MD  02/27/18 6626

## 2018-02-27 NOTE — PROGRESS NOTES
· Bronchodilator assessment   []    Bronchodilator Assessment      Bronchodilator assessment at level  2  BRONCHODILATOR ASSESSMENT SCORE  Score 1 2 3 4   Breath Sounds   []  Clear [x]  Mild Wheezing with good aeration []  Moderate I/E wheezing with adequate aeration []  Poor Aeration or diffuse wheezing   Respiratory Rate [x]  Less than 20 []  20-25 []  Greater than 25  []  Greater than 35    Dyspnea []  No SOB  [x]  SOB with minimal activity []  Speaking in partial sentences []  Acute/ At rest   Peakflow (asthma) []  80 % or greater predicted/PB  []  Unable []  70% or greater predicted/PB  []  Unable []  51%-70% predicted/PB  []  Unable []  Less than 50% predicted/PB  []  Unable due to distress   FEV1 % Predicted []  Greater than 69%  []  Unable  []  Less than 50%-69%  []  Unable  []  Less than 35%-49%  []  Unable  []  Less than 35%  []  Unable due to distress       ABRIL SEVERIANO BERGERON                                FEMALE                                  MALE                            FEV1 Predicted Normal Values                        FEV1 Predicted Normal Values          Age                                     Height in Feet and Inches       Age                                     Height in Feet and Inches       4' 11\" 5' 1\" 5' 3\" 5' 5\" 5' 7\" 5' 9\" 5' 11\" 6' 1\"  4' 11\" 5' 1\" 5' 3\" 5' 5\" 5' 7\" 5' 9\" 5' 11\" 6' 1\"   42 - 45 2.49 2.66 2.84 3.03 3.22 3.42 3.62 3.83 42 - 45 2.82 3.03 3.26 3.49 3.72 3.96 4.22 4.47   46 - 49 2.40 2.57 2.76 2.94 3.14 3.33 3.54 3.75 46 - 49 2.70 2.92 3.14 3.37 3.61 3.85 4.10 4.36   50 - 53 2.31 2.48 2.66 2.85 3.04 3.24 3.45 3.66 50 - 53 2.58 2.80 3.02 3.25 3.49 3.73 3.98 4.24   54 - 57 2.21 2.38 2.57 2.75 2.95 3.14 3.35 3.56 54 - 57 2.46 2.67 2.89 3.12 3.36 3.60 3.85 4.11   58 - 61 2.10 2.28 2.46 2.65 2.84 3.04 3.24 3.45 58 - 61 2.32 2.54 2.76 2.99 3.23 3.47 3.72 3.98   62 - 65 1.99 2.17 2.35 2.54 2.73 2.93 3.13 3.34 62 - 65 2.19 2.40 2.62 2.85 3.09 3.33 3.58 3.84   66 - 69 1.88 2.05 2.23 2. 42 2.61 2.81 3.02 3.23 66 - 69 2.04 2.26 2.48 2.71 2.95 3.19 3.44 3.70   70+ 1.82 1.99 2.17 2.36 2.55 2.75 2.95 3.16 70+ 1.97 2.19 2.41 2.64 2.87 3.12 3.37 3.62

## 2018-02-27 NOTE — ED NOTES
Pt resting on cot, alert, oriented, no distress noted, rr even and non labored.  Pt updated on plan of care and duration, pt denies needs, will continue to monitor     Hugo Guerra RN  02/27/18 [id number]

## 2018-03-02 ENCOUNTER — HOSPITAL ENCOUNTER (EMERGENCY)
Age: 48
Discharge: HOME | End: 2018-03-02
Attending: EMERGENCY MEDICINE
Payer: COMMERCIAL

## 2018-03-02 ENCOUNTER — APPOINTMENT (OUTPATIENT)
Dept: CT IMAGING | Age: 48
End: 2018-03-02
Payer: COMMERCIAL

## 2018-03-02 ENCOUNTER — APPOINTMENT (OUTPATIENT)
Dept: GENERAL RADIOLOGY | Age: 48
End: 2018-03-02
Payer: COMMERCIAL

## 2018-03-02 VITALS
HEART RATE: 87 BPM | DIASTOLIC BLOOD PRESSURE: 114 MMHG | SYSTOLIC BLOOD PRESSURE: 163 MMHG | TEMPERATURE: 98.7 F | OXYGEN SATURATION: 98 % | RESPIRATION RATE: 20 BRPM

## 2018-03-02 DIAGNOSIS — B33.8 RESPIRATORY SYNCYTIAL VIRUS (RSV): ICD-10-CM

## 2018-03-02 DIAGNOSIS — J44.1 COPD EXACERBATION (HCC): Primary | ICD-10-CM

## 2018-03-02 DIAGNOSIS — I10 HYPERTENSION, UNSPECIFIED TYPE: ICD-10-CM

## 2018-03-02 DIAGNOSIS — F17.200 SMOKER: ICD-10-CM

## 2018-03-02 LAB
ADENOVIRUS PCR: NOT DETECTED
BORDETELLA PERTUSSIS PCR: NOT DETECTED
CHLAMYDIA PNEUMONIAE BY PCR: NOT DETECTED
CORONAVIRUS 229E PCR: NOT DETECTED
CORONAVIRUS HKU1 PCR: NOT DETECTED
CORONAVIRUS NL63 PCR: NOT DETECTED
CORONAVIRUS OC43 PCR: NOT DETECTED
D-DIMER QUANTITATIVE: 0.86 MG/L FEU
EKG ATRIAL RATE: 82 BPM
EKG P AXIS: 31 DEGREES
EKG P-R INTERVAL: 142 MS
EKG Q-T INTERVAL: 392 MS
EKG QRS DURATION: 86 MS
EKG QTC CALCULATION (BAZETT): 457 MS
EKG R AXIS: 43 DEGREES
EKG T AXIS: 48 DEGREES
EKG VENTRICULAR RATE: 82 BPM
HUMAN METAPNEUMOVIRUS PCR: NOT DETECTED
INFLUENZA A BY PCR: NOT DETECTED
INFLUENZA A H1 (2009) PCR: ABNORMAL
INFLUENZA A H1 PCR: ABNORMAL
INFLUENZA A H3 PCR: ABNORMAL
INFLUENZA B BY PCR: NOT DETECTED
MYCOPLASMA PNEUMONIAE PCR: NOT DETECTED
PARAINFLUENZA 1 PCR: NOT DETECTED
PARAINFLUENZA 2 PCR: NOT DETECTED
PARAINFLUENZA 3 PCR: NOT DETECTED
PARAINFLUENZA 4 PCR: NOT DETECTED
POC TROPONIN I: 0 NG/ML (ref 0–0.1)
POC TROPONIN INTERP: NORMAL
RESP SYNCYTIAL VIRUS PCR: DETECTED
RHINO/ENTEROVIRUS PCR: NOT DETECTED
SOURCE: ABNORMAL

## 2018-03-02 PROCEDURE — 6360000002 HC RX W HCPCS: Performed by: EMERGENCY MEDICINE

## 2018-03-02 PROCEDURE — 94640 AIRWAY INHALATION TREATMENT: CPT

## 2018-03-02 PROCEDURE — 71046 X-RAY EXAM CHEST 2 VIEWS: CPT

## 2018-03-02 PROCEDURE — 87798 DETECT AGENT NOS DNA AMP: CPT

## 2018-03-02 PROCEDURE — 94664 DEMO&/EVAL PT USE INHALER: CPT

## 2018-03-02 PROCEDURE — 85379 FIBRIN DEGRADATION QUANT: CPT

## 2018-03-02 PROCEDURE — 6360000004 HC RX CONTRAST MEDICATION: Performed by: EMERGENCY MEDICINE

## 2018-03-02 PROCEDURE — 84484 ASSAY OF TROPONIN QUANT: CPT

## 2018-03-02 PROCEDURE — 87633 RESP VIRUS 12-25 TARGETS: CPT

## 2018-03-02 PROCEDURE — 87486 CHLMYD PNEUM DNA AMP PROBE: CPT

## 2018-03-02 PROCEDURE — 99284 EMERGENCY DEPT VISIT MOD MDM: CPT

## 2018-03-02 PROCEDURE — 93005 ELECTROCARDIOGRAM TRACING: CPT

## 2018-03-02 PROCEDURE — 71260 CT THORAX DX C+: CPT

## 2018-03-02 PROCEDURE — 94762 N-INVAS EAR/PLS OXIMTRY CONT: CPT

## 2018-03-02 PROCEDURE — 96374 THER/PROPH/DIAG INJ IV PUSH: CPT

## 2018-03-02 PROCEDURE — 87581 M.PNEUMON DNA AMP PROBE: CPT

## 2018-03-02 PROCEDURE — 6370000000 HC RX 637 (ALT 250 FOR IP): Performed by: EMERGENCY MEDICINE

## 2018-03-02 RX ORDER — ALBUTEROL SULFATE 90 UG/1
2 AEROSOL, METERED RESPIRATORY (INHALATION)
Status: DISCONTINUED | OUTPATIENT
Start: 2018-03-02 | End: 2018-03-02

## 2018-03-02 RX ORDER — DEXAMETHASONE SODIUM PHOSPHATE 10 MG/ML
10 INJECTION INTRAMUSCULAR; INTRAVENOUS ONCE
Status: COMPLETED | OUTPATIENT
Start: 2018-03-02 | End: 2018-03-02

## 2018-03-02 RX ORDER — IPRATROPIUM BROMIDE AND ALBUTEROL SULFATE 2.5; .5 MG/3ML; MG/3ML
1 SOLUTION RESPIRATORY (INHALATION)
Status: DISCONTINUED | OUTPATIENT
Start: 2018-03-02 | End: 2018-03-02

## 2018-03-02 RX ORDER — ALBUTEROL SULFATE 90 UG/1
2 AEROSOL, METERED RESPIRATORY (INHALATION)
Status: DISCONTINUED | OUTPATIENT
Start: 2018-03-02 | End: 2018-03-02 | Stop reason: HOSPADM

## 2018-03-02 RX ORDER — BENZONATATE 100 MG/1
100 CAPSULE ORAL ONCE
Status: COMPLETED | OUTPATIENT
Start: 2018-03-02 | End: 2018-03-02

## 2018-03-02 RX ORDER — BENZONATATE 100 MG/1
100 CAPSULE ORAL 3 TIMES DAILY PRN
Qty: 30 CAPSULE | Refills: 0 | Status: SHIPPED | OUTPATIENT
Start: 2018-03-02 | End: 2018-03-09

## 2018-03-02 RX ORDER — ALBUTEROL SULFATE 2.5 MG/3ML
5 SOLUTION RESPIRATORY (INHALATION)
Status: DISCONTINUED | OUTPATIENT
Start: 2018-03-02 | End: 2018-03-02 | Stop reason: HOSPADM

## 2018-03-02 RX ADMIN — ALBUTEROL SULFATE 10 MG: 5 SOLUTION RESPIRATORY (INHALATION) at 12:44

## 2018-03-02 RX ADMIN — BENZONATATE 100 MG: 100 CAPSULE ORAL at 12:59

## 2018-03-02 RX ADMIN — IPRATROPIUM BROMIDE 0.5 MG: 0.5 SOLUTION RESPIRATORY (INHALATION) at 12:44

## 2018-03-02 RX ADMIN — IOPAMIDOL 75 ML: 755 INJECTION, SOLUTION INTRAVENOUS at 14:57

## 2018-03-02 RX ADMIN — ALBUTEROL SULFATE 5 MG: 5 SOLUTION RESPIRATORY (INHALATION) at 14:28

## 2018-03-02 RX ADMIN — DEXAMETHASONE SODIUM PHOSPHATE 10 MG: 10 INJECTION INTRAMUSCULAR; INTRAVENOUS at 12:59

## 2018-03-02 ASSESSMENT — PAIN SCALES - GENERAL: PAINLEVEL_OUTOF10: 7

## 2018-03-02 NOTE — ED PROVIDER NOTES
1/10/2010    Smokeless tobacco: Never Used    Alcohol use 0.0 oz/week      Comment: Socially    Drug use: No    Sexual activity: Not on file     Other Topics Concern    Not on file     Social History Narrative    No narrative on file       Family History   Problem Relation Age of Onset    Hypertension Mother     Hypertension Father     Diabetes Other      cousin       Allergies:  Food; Penicillins; Parafon forte dsc [chlorzoxazone]; and Biaxin [clarithromycin]    Home Medications:  Prior to Admission medications    Medication Sig Start Date End Date Taking? Authorizing Provider   benzonatate (TESSALON PERLES) 100 MG capsule Take 1 capsule by mouth 3 times daily as needed for Cough 3/2/18 3/9/18 Yes Adolfo Jerez MD   lithium 300 MG tablet Take 300 mg by mouth 3 times daily 450 mg am 900 mg pm    Historical Provider, MD   predniSONE (DELTASONE) 50 MG tablet Take 1 tablet by mouth daily 2/27/18   Breann Camejo MD   levofloxacin (LEVAQUIN) 750 MG tablet Take 1 tablet by mouth daily for 7 days 2/27/18 3/6/18  Breann Camejo MD   predniSONE (DELTASONE) 10 MG tablet Four tablets per day (total of 40mg) for the next four days. Please take the first dose Tuesday, Jan 9th.  1/8/18   Anna Maldonado MD   naproxen (NAPROSYN) 500 MG tablet Take 500 mg by mouth 2 times daily (with meals)    Historical Provider, MD   albuterol (PROVENTIL) (2.5 MG/3ML) 0.083% nebulizer solution Take 3 mLs by nebulization every 4 hours as needed for Wheezing 7/11/16   Laurann Kussmaul, MD   amLODIPine (NORVASC) 5 MG tablet Take 1 tablet by mouth daily 7/11/16   Laurann Kussmaul, MD   hydrochlorothiazide (MICROZIDE) 12.5 MG capsule Take 1 capsule by mouth daily 7/11/16   Laurann Kussmaul, MD   Tiotropium Bromide Monohydrate 2.5 MCG/ACT AERS Inhale 2 puffs into the lungs daily 6/8/16   Martha Tinoco NP   omeprazole (PRILOSEC) 20 MG capsule Take 20 mg by mouth daily    Historical Provider, MD   carvedilol (COREG) 6.25 edema, pleural effusion or pulmonary embolism  - Cardiac causes: Heart failure, ACS, cardiac arrhythmias  - Toxins: Cyanide, Carbon monoxide, drugs, DKA  - Neurologic causes: Guillain-Barré , stroke    DIAGNOSTIC RESULTS / EMERGENCY DEPARTMENT COURSE / MDM     LABS:  Results for orders placed or performed during the hospital encounter of 03/02/18   Respiratory Virus PCR Panel   Result Value Ref Range    Source . NASOPHARYNGEAL SWAB     Adenovirus PCR Not Detected NOTDET    Coronavirus 229E PCR Not Detected NOTDET    Coronavirus HKU1 PCR Not Detected NOTDET    Coronavirus NL63 PCR Not Detected NOTDET    Coronavirus OC43 PCR Not Detected NOTDET    Human Metapneumovirus PCR Not Detected NOTDET    Rhino/Enterovirus PCR Not Detected NOTDET    Influenza A by PCR Not Detected NOTDET    Influenza A H1 PCR NOT REPORTED NOTDET    Influenza A H1 (2009) PCR NOT REPORTED NOTDET    Influenza A H3 PCR NOT REPORTED NOTDET    Influenza B by PCR Not Detected NOTDET    Parainfluenza 1 PCR Not Detected NOTDET    Parainfluenza 2 PCR Not Detected NOTDET    Parainfluenza 3 PCR Not Detected NOTDET    Parainfluenza 4 PCR Not Detected NOTDET    Resp Syncytial Virus PCR DETECTED (A) NOTDET    B Pertussis by PCR Not Detected NOTDET    Chlamydia pneumoniae By PCR Not Detected NOTDET    Mycoplasma pneumo by PCR Not Detected NOTDET   D-Dimer, Quantitative   Result Value Ref Range    D-Dimer, Quant 0.86 mg/L FEU   POCT troponin   Result Value Ref Range    POC Troponin I 0.00 0.00 - 0.10 ng/mL    POC Troponin Interp       The Troponin-I (POC) results cannot be compared to the Troponin-T results. EKG 12 Lead   Result Value Ref Range    Ventricular Rate 82 BPM    Atrial Rate 82 BPM    P-R Interval 142 ms    QRS Duration 86 ms    Q-T Interval 392 ms    QTc Calculation (Bazett) 457 ms    P Axis 31 degrees    R Axis 43 degrees    T Axis 48 degrees       IMPRESSION: COPD exacerbation by Gold Criteria.  Ddimer given slight infiltrate possible wedge

## 2018-03-02 NOTE — ED PROVIDER NOTES
[facility] ED  Emergency Department  Faculty Sign-Out Addendum     Care of Farheen Urena was assumed from previous attending and is being seen for Cough (Pt to ED with c/o SOB and yellow cough for the past few days, pt was here for the same a few days ago and sent home with ATB and prednisone) and Shortness of Breath  . The patient's initial evaluation and plan have been discussed with the prior provider who initially evaluated the patient. EMERGENCY DEPARTMENT COURSE / MEDICAL DECISION MAKING:       MEDICATIONS GIVEN:  Orders Placed This Encounter   Medications    dexamethasone (DECADRON) injection 10 mg    DISCONTD: albuterol (PROVENTIL) nebulizer solution 5 mg    DISCONTD: ipratropium-albuterol (DUONEB) nebulizer solution 1 ampule    albuterol (PROVENTIL) nebulizer solution 5 mg    albuterol sulfate  (90 Base) MCG/ACT inhaler 2 puff    DISCONTD: albuterol sulfate  (90 Base) MCG/ACT inhaler 2 puff    ipratropium (ATROVENT HFA) 17 MCG/ACT inhaler 2 puff    ipratropium (ATROVENT) 0.02 % nebulizer solution 0.5 mg    albuterol (PROVENTIL) nebulizer solution 10 mg    benzonatate (TESSALON) capsule 100 mg    iopamidol (ISOVUE-370) 76 % injection 75 mL       LABS / RADIOLOGY:     Labs Reviewed   RESPIRATORY VIRUS PCR PANEL   D-DIMER, QUANTITATIVE   POCT TROPONIN   POCT TROPONIN       Xr Chest Standard (2 Vw)    Result Date: 3/2/2018  EXAMINATION: TWO VIEWS OF THE CHEST 3/2/2018 1:20 pm COMPARISON: Chest x-ray from 02/27/2018 HISTORY: ORDERING SYSTEM PROVIDED HISTORY: chest pain TECHNOLOGIST PROVIDED HISTORY: Reason for exam:->chest pain FINDINGS: There is no focal airspace consolidation, pleural effusion or pneumothorax. The cardiomediastinal silhouette appears within normal limits and there is no pulmonary vascular congestion. Visualized osseous structures appear intact, and grossly unremarkable, given the non dedicated imaging.      No radiographic evidence for acute cardiopulmonary disease process. Xr Chest Standard (2 Vw)    Result Date: 2/27/2018  EXAMINATION: TWO VIEWS OF THE CHEST 2/27/2018 10:25 am COMPARISON: January 8, 2018 HISTORY: ORDERING SYSTEM PROVIDED HISTORY: cough TECHNOLOGIST PROVIDED HISTORY: Reason for exam:->cough FINDINGS: Stable cardiomediastinal silhouette. No pulmonary venous congestion or edema. No focal lung consolidation or infiltrate. No pleural effusion or pneumothorax. No acute cardiopulmonary process. RECENT VITALS:     Temp: 98.7 °F (37.1 °C),  Pulse: 87, Resp: 20, BP: (!) 163/114, SpO2: 98 %    This patient is a 52 y.o. Female with shortness of breath, cough, posttussive emesis. Sats at home 81-84%. D-dimer positive, pending CT PE at this time. Plan is admission for COPD exacerbation after workup. OUTSTANDING TASKS / RECOMMENDATIONS:    1. Labs  2. CT PE  3.  Admission      Killian Millan MD  Attending Emergency Physician  [facility] ED       Victoria Christian MD  03/02/18 [id number]

## 2018-03-02 NOTE — ED PROVIDER NOTES
pain Immobility. She Normally Has Pulse Oximetry Readings at Home of 9192% on Room Air. Recently She's Been Having Numbers of 80-84%. She No Longer Has Home Oxygen. She's Had Prior Intubation. She Is Not on Anti-Inflammatories or Anticholinergic Medications. No recent fever or sputum coryza. On exam she is hypertensive afebrile minimal distress. She has diminished breath sounds. There is however persistent coughing paroxysmally. Chest x-ray shows improvement in her right lower lobe infiltrate. Differential includes COPD exacerbation, pulmonary embolism, less likely restrained infection, consider pertussis, chlamydia, viral.  Plan is steroids, aerosols, d-dimer, scan is positive, respiratory panel, admission. David Cervantes.  Carlee Ryan MD, [facility]  Attending Emergency  Physician                Eduardo Kaur MD  03/02/18 1137

## 2018-05-22 ENCOUNTER — HOSPITAL ENCOUNTER (EMERGENCY)
Age: 48
Discharge: HOME | End: 2018-05-22
Attending: EMERGENCY MEDICINE
Payer: COMMERCIAL

## 2018-05-22 ENCOUNTER — APPOINTMENT (OUTPATIENT)
Dept: GENERAL RADIOLOGY | Age: 48
End: 2018-05-22
Payer: COMMERCIAL

## 2018-05-22 VITALS
OXYGEN SATURATION: 95 % | SYSTOLIC BLOOD PRESSURE: 178 MMHG | TEMPERATURE: 98.5 F | RESPIRATION RATE: 18 BRPM | DIASTOLIC BLOOD PRESSURE: 114 MMHG | HEART RATE: 90 BPM

## 2018-05-22 DIAGNOSIS — M25.561 ACUTE PAIN OF RIGHT KNEE: Primary | ICD-10-CM

## 2018-05-22 DIAGNOSIS — S39.012A BACK STRAIN, INITIAL ENCOUNTER: ICD-10-CM

## 2018-05-22 PROCEDURE — 99283 EMERGENCY DEPT VISIT LOW MDM: CPT

## 2018-05-22 PROCEDURE — 6370000000 HC RX 637 (ALT 250 FOR IP): Performed by: EMERGENCY MEDICINE

## 2018-05-22 PROCEDURE — 72100 X-RAY EXAM L-S SPINE 2/3 VWS: CPT

## 2018-05-22 RX ORDER — IBUPROFEN 800 MG/1
800 TABLET ORAL ONCE
Status: COMPLETED | OUTPATIENT
Start: 2018-05-22 | End: 2018-05-22

## 2018-05-22 RX ORDER — CYCLOBENZAPRINE HCL 10 MG
10 TABLET ORAL 3 TIMES DAILY PRN
Qty: 20 TABLET | Refills: 0 | Status: SHIPPED | OUTPATIENT
Start: 2018-05-22 | End: 2018-06-01

## 2018-05-22 RX ORDER — IBUPROFEN 800 MG/1
800 TABLET ORAL EVERY 8 HOURS PRN
Qty: 30 TABLET | Refills: 0 | Status: SHIPPED | OUTPATIENT
Start: 2018-05-22 | End: 2018-11-19 | Stop reason: ALTCHOICE

## 2018-05-22 RX ORDER — ACETAMINOPHEN 500 MG
1000 TABLET ORAL ONCE
Status: COMPLETED | OUTPATIENT
Start: 2018-05-22 | End: 2018-05-22

## 2018-05-22 RX ORDER — ACETAMINOPHEN 500 MG
1000 TABLET ORAL EVERY 8 HOURS PRN
Qty: 30 TABLET | Refills: 0 | Status: SHIPPED | OUTPATIENT
Start: 2018-05-22 | End: 2018-11-19 | Stop reason: ALTCHOICE

## 2018-05-22 RX ADMIN — ACETAMINOPHEN 1000 MG: 500 TABLET ORAL at 11:13

## 2018-05-22 RX ADMIN — IBUPROFEN 800 MG: 800 TABLET ORAL at 11:13

## 2018-05-22 ASSESSMENT — PAIN DESCRIPTION - LOCATION: LOCATION: KNEE;BACK

## 2018-05-22 ASSESSMENT — ENCOUNTER SYMPTOMS
ABDOMINAL PAIN: 0
SHORTNESS OF BREATH: 0
VOMITING: 0
COUGH: 0
BACK PAIN: 1

## 2018-05-22 ASSESSMENT — PAIN SCALES - GENERAL: PAINLEVEL_OUTOF10: 10

## 2018-05-22 ASSESSMENT — PAIN DESCRIPTION - PAIN TYPE: TYPE: ACUTE PAIN

## 2018-05-22 ASSESSMENT — PAIN DESCRIPTION - ORIENTATION: ORIENTATION: RIGHT;LOWER

## 2018-08-06 ENCOUNTER — APPOINTMENT (OUTPATIENT)
Dept: CT IMAGING | Age: 48
End: 2018-08-06
Payer: COMMERCIAL

## 2018-08-06 ENCOUNTER — APPOINTMENT (OUTPATIENT)
Dept: GENERAL RADIOLOGY | Age: 48
End: 2018-08-06
Payer: COMMERCIAL

## 2018-08-06 ENCOUNTER — HOSPITAL ENCOUNTER (OUTPATIENT)
Age: 48
Setting detail: OBSERVATION
Discharge: HOME | End: 2018-08-08
Attending: EMERGENCY MEDICINE | Admitting: EMERGENCY MEDICINE
Payer: COMMERCIAL

## 2018-08-06 DIAGNOSIS — R07.9 CHEST PAIN, UNSPECIFIED TYPE: Primary | ICD-10-CM

## 2018-08-06 DIAGNOSIS — F41.9 ANXIETY: ICD-10-CM

## 2018-08-06 LAB
ABSOLUTE EOS #: 1.09 K/UL (ref 0–0.44)
ABSOLUTE IMMATURE GRANULOCYTE: 0.03 K/UL (ref 0–0.3)
ABSOLUTE LYMPH #: 2.82 K/UL (ref 1.1–3.7)
ABSOLUTE MONO #: 0.7 K/UL (ref 0.1–1.2)
ANION GAP SERPL CALCULATED.3IONS-SCNC: 10 MMOL/L (ref 9–17)
BASOPHILS # BLD: 1 % (ref 0–2)
BASOPHILS ABSOLUTE: 0.05 K/UL (ref 0–0.2)
BNP INTERPRETATION: NORMAL
BUN BLDV-MCNC: 5 MG/DL (ref 6–20)
BUN/CREAT BLD: ABNORMAL (ref 9–20)
CALCIUM SERPL-MCNC: 9.8 MG/DL (ref 8.6–10.4)
CHLORIDE BLD-SCNC: 102 MMOL/L (ref 98–107)
CO2: 25 MMOL/L (ref 20–31)
CREAT SERPL-MCNC: 0.54 MG/DL (ref 0.5–0.9)
D-DIMER QUANTITATIVE: 0.77 MG/L FEU
DIFFERENTIAL TYPE: ABNORMAL
EOSINOPHILS RELATIVE PERCENT: 13 % (ref 1–4)
GFR AFRICAN AMERICAN: >60 ML/MIN
GFR NON-AFRICAN AMERICAN: >60 ML/MIN
GFR SERPL CREATININE-BSD FRML MDRD: ABNORMAL ML/MIN/{1.73_M2}
GFR SERPL CREATININE-BSD FRML MDRD: ABNORMAL ML/MIN/{1.73_M2}
GLUCOSE BLD-MCNC: 95 MG/DL (ref 70–99)
HCT VFR BLD CALC: 46.1 % (ref 36.3–47.1)
HEMOGLOBIN: 15.4 G/DL (ref 11.9–15.1)
IMMATURE GRANULOCYTES: 0 %
LYMPHOCYTES # BLD: 33 % (ref 24–43)
MCH RBC QN AUTO: 30.8 PG (ref 25.2–33.5)
MCHC RBC AUTO-ENTMCNC: 33.4 G/DL (ref 28.4–34.8)
MCV RBC AUTO: 92.2 FL (ref 82.6–102.9)
MONOCYTES # BLD: 8 % (ref 3–12)
NRBC AUTOMATED: 0 PER 100 WBC
PDW BLD-RTO: 13.1 % (ref 11.8–14.4)
PLATELET # BLD: 238 K/UL (ref 138–453)
PLATELET ESTIMATE: ABNORMAL
PMV BLD AUTO: 10.6 FL (ref 8.1–13.5)
POC TROPONIN I: 0 NG/ML (ref 0–0.1)
POC TROPONIN I: 0 NG/ML (ref 0–0.1)
POC TROPONIN INTERP: NORMAL
POC TROPONIN INTERP: NORMAL
POTASSIUM SERPL-SCNC: 4.2 MMOL/L (ref 3.7–5.3)
PRO-BNP: 110 PG/ML
RBC # BLD: 5 M/UL (ref 3.95–5.11)
RBC # BLD: ABNORMAL 10*6/UL
SEG NEUTROPHILS: 45 % (ref 36–65)
SEGMENTED NEUTROPHILS ABSOLUTE COUNT: 3.79 K/UL (ref 1.5–8.1)
SODIUM BLD-SCNC: 137 MMOL/L (ref 135–144)
WBC # BLD: 8.5 K/UL (ref 3.5–11.3)
WBC # BLD: ABNORMAL 10*3/UL

## 2018-08-06 PROCEDURE — 84484 ASSAY OF TROPONIN QUANT: CPT

## 2018-08-06 PROCEDURE — 85379 FIBRIN DEGRADATION QUANT: CPT

## 2018-08-06 PROCEDURE — 93970 EXTREMITY STUDY: CPT

## 2018-08-06 PROCEDURE — 94640 AIRWAY INHALATION TREATMENT: CPT

## 2018-08-06 PROCEDURE — 80048 BASIC METABOLIC PNL TOTAL CA: CPT

## 2018-08-06 PROCEDURE — 6370000000 HC RX 637 (ALT 250 FOR IP): Performed by: STUDENT IN AN ORGANIZED HEALTH CARE EDUCATION/TRAINING PROGRAM

## 2018-08-06 PROCEDURE — 85025 COMPLETE CBC W/AUTO DIFF WBC: CPT

## 2018-08-06 PROCEDURE — 2580000003 HC RX 258: Performed by: STUDENT IN AN ORGANIZED HEALTH CARE EDUCATION/TRAINING PROGRAM

## 2018-08-06 PROCEDURE — 71260 CT THORAX DX C+: CPT

## 2018-08-06 PROCEDURE — 6360000004 HC RX CONTRAST MEDICATION: Performed by: STUDENT IN AN ORGANIZED HEALTH CARE EDUCATION/TRAINING PROGRAM

## 2018-08-06 PROCEDURE — 93005 ELECTROCARDIOGRAM TRACING: CPT

## 2018-08-06 PROCEDURE — G0378 HOSPITAL OBSERVATION PER HR: HCPCS

## 2018-08-06 PROCEDURE — 71045 X-RAY EXAM CHEST 1 VIEW: CPT

## 2018-08-06 PROCEDURE — 99285 EMERGENCY DEPT VISIT HI MDM: CPT

## 2018-08-06 PROCEDURE — 83036 HEMOGLOBIN GLYCOSYLATED A1C: CPT

## 2018-08-06 PROCEDURE — 6360000002 HC RX W HCPCS: Performed by: STUDENT IN AN ORGANIZED HEALTH CARE EDUCATION/TRAINING PROGRAM

## 2018-08-06 PROCEDURE — 2580000003 HC RX 258: Performed by: EMERGENCY MEDICINE

## 2018-08-06 PROCEDURE — 83880 ASSAY OF NATRIURETIC PEPTIDE: CPT

## 2018-08-06 RX ORDER — LITHIUM CARBONATE 300 MG
900 TABLET ORAL NIGHTLY
Status: DISCONTINUED | OUTPATIENT
Start: 2018-08-06 | End: 2018-08-08 | Stop reason: HOSPADM

## 2018-08-06 RX ORDER — TOPIRAMATE 25 MG/1
50 TABLET ORAL 2 TIMES DAILY
Status: DISCONTINUED | OUTPATIENT
Start: 2018-08-06 | End: 2018-08-08 | Stop reason: HOSPADM

## 2018-08-06 RX ORDER — NAPROXEN 250 MG/1
500 TABLET ORAL 2 TIMES DAILY WITH MEALS
Status: DISCONTINUED | OUTPATIENT
Start: 2018-08-06 | End: 2018-08-06

## 2018-08-06 RX ORDER — SODIUM CHLORIDE 0.9 % (FLUSH) 0.9 %
10 SYRINGE (ML) INJECTION PRN
Status: DISCONTINUED | OUTPATIENT
Start: 2018-08-06 | End: 2018-08-06

## 2018-08-06 RX ORDER — LITHIUM CARBONATE 300 MG
300 TABLET ORAL 3 TIMES DAILY
Status: DISCONTINUED | OUTPATIENT
Start: 2018-08-06 | End: 2018-08-06

## 2018-08-06 RX ORDER — ONDANSETRON 4 MG/1
4 TABLET, ORALLY DISINTEGRATING ORAL EVERY 8 HOURS PRN
Status: DISCONTINUED | OUTPATIENT
Start: 2018-08-06 | End: 2018-08-08 | Stop reason: HOSPADM

## 2018-08-06 RX ORDER — VENLAFAXINE HYDROCHLORIDE 150 MG/1
150 CAPSULE, EXTENDED RELEASE ORAL
Status: DISCONTINUED | OUTPATIENT
Start: 2018-08-07 | End: 2018-08-08 | Stop reason: HOSPADM

## 2018-08-06 RX ORDER — ALBUTEROL SULFATE 2.5 MG/3ML
2.5 SOLUTION RESPIRATORY (INHALATION) EVERY 4 HOURS PRN
Status: DISCONTINUED | OUTPATIENT
Start: 2018-08-06 | End: 2018-08-08 | Stop reason: HOSPADM

## 2018-08-06 RX ORDER — ASPIRIN 81 MG/1
81 TABLET, CHEWABLE ORAL DAILY
Status: DISCONTINUED | OUTPATIENT
Start: 2018-08-06 | End: 2018-08-08 | Stop reason: HOSPADM

## 2018-08-06 RX ORDER — HYDROCHLOROTHIAZIDE 12.5 MG/1
12.5 CAPSULE, GELATIN COATED ORAL DAILY
Status: DISCONTINUED | OUTPATIENT
Start: 2018-08-06 | End: 2018-08-08 | Stop reason: HOSPADM

## 2018-08-06 RX ORDER — SODIUM CHLORIDE 0.9 % (FLUSH) 0.9 %
10 SYRINGE (ML) INJECTION 2 TIMES DAILY
Status: DISCONTINUED | OUTPATIENT
Start: 2018-08-06 | End: 2018-08-06

## 2018-08-06 RX ORDER — PANTOPRAZOLE SODIUM 40 MG/1
40 TABLET, DELAYED RELEASE ORAL
Status: DISCONTINUED | OUTPATIENT
Start: 2018-08-07 | End: 2018-08-08 | Stop reason: HOSPADM

## 2018-08-06 RX ORDER — ACETAMINOPHEN 500 MG
1000 TABLET ORAL EVERY 8 HOURS PRN
Status: DISCONTINUED | OUTPATIENT
Start: 2018-08-06 | End: 2018-08-06

## 2018-08-06 RX ORDER — ASPIRIN 81 MG/1
324 TABLET, CHEWABLE ORAL ONCE
Status: COMPLETED | OUTPATIENT
Start: 2018-08-06 | End: 2018-08-06

## 2018-08-06 RX ORDER — AMLODIPINE BESYLATE 5 MG/1
5 TABLET ORAL DAILY
Status: DISCONTINUED | OUTPATIENT
Start: 2018-08-06 | End: 2018-08-08 | Stop reason: HOSPADM

## 2018-08-06 RX ORDER — SODIUM CHLORIDE 0.9 % (FLUSH) 0.9 %
10 SYRINGE (ML) INJECTION PRN
Status: DISCONTINUED | OUTPATIENT
Start: 2018-08-06 | End: 2018-08-08 | Stop reason: HOSPADM

## 2018-08-06 RX ORDER — CARVEDILOL 6.25 MG/1
6.25 TABLET ORAL 2 TIMES DAILY WITH MEALS
Status: DISCONTINUED | OUTPATIENT
Start: 2018-08-06 | End: 2018-08-08 | Stop reason: HOSPADM

## 2018-08-06 RX ORDER — SODIUM CHLORIDE 9 MG/ML
INJECTION, SOLUTION INTRAVENOUS CONTINUOUS
Status: DISCONTINUED | OUTPATIENT
Start: 2018-08-07 | End: 2018-08-08 | Stop reason: HOSPADM

## 2018-08-06 RX ORDER — ACETAMINOPHEN 325 MG/1
650 TABLET ORAL EVERY 6 HOURS PRN
Status: DISCONTINUED | OUTPATIENT
Start: 2018-08-06 | End: 2018-08-08 | Stop reason: HOSPADM

## 2018-08-06 RX ORDER — BACLOFEN 10 MG/1
10 TABLET ORAL 3 TIMES DAILY
Status: DISCONTINUED | OUTPATIENT
Start: 2018-08-06 | End: 2018-08-08 | Stop reason: HOSPADM

## 2018-08-06 RX ORDER — ACETAMINOPHEN 325 MG/1
650 TABLET ORAL EVERY 4 HOURS PRN
Status: DISCONTINUED | OUTPATIENT
Start: 2018-08-06 | End: 2018-08-06

## 2018-08-06 RX ORDER — ALBUTEROL SULFATE 90 UG/1
2 AEROSOL, METERED RESPIRATORY (INHALATION) EVERY 4 HOURS PRN
Status: DISCONTINUED | OUTPATIENT
Start: 2018-08-06 | End: 2018-08-08 | Stop reason: HOSPADM

## 2018-08-06 RX ORDER — SODIUM CHLORIDE 0.9 % (FLUSH) 0.9 %
10 SYRINGE (ML) INJECTION EVERY 12 HOURS SCHEDULED
Status: DISCONTINUED | OUTPATIENT
Start: 2018-08-06 | End: 2018-08-08 | Stop reason: HOSPADM

## 2018-08-06 RX ORDER — ALPRAZOLAM 0.5 MG/1
0.5 TABLET ORAL NIGHTLY PRN
Status: DISCONTINUED | OUTPATIENT
Start: 2018-08-06 | End: 2018-08-08 | Stop reason: HOSPADM

## 2018-08-06 RX ORDER — LITHIUM CARBONATE 300 MG
450 TABLET ORAL DAILY
Status: DISCONTINUED | OUTPATIENT
Start: 2018-08-07 | End: 2018-08-08 | Stop reason: HOSPADM

## 2018-08-06 RX ADMIN — ASPIRIN 81 MG 324 MG: 81 TABLET ORAL at 13:32

## 2018-08-06 RX ADMIN — Medication 10 ML: at 21:20

## 2018-08-06 RX ADMIN — ALBUTEROL SULFATE 2.5 MG: 2.5 SOLUTION RESPIRATORY (INHALATION) at 20:30

## 2018-08-06 RX ADMIN — IOPAMIDOL 75 ML: 755 INJECTION, SOLUTION INTRAVENOUS at 14:11

## 2018-08-06 RX ADMIN — BACLOFEN 10 MG: 10 TABLET ORAL at 21:20

## 2018-08-06 RX ADMIN — LITHIUM CARBONATE 900 MG: 300 TABLET ORAL at 21:20

## 2018-08-06 RX ADMIN — SODIUM CHLORIDE: 9 INJECTION, SOLUTION INTRAVENOUS at 21:22

## 2018-08-06 RX ADMIN — ALPRAZOLAM 0.5 MG: 0.5 TABLET ORAL at 21:20

## 2018-08-06 ASSESSMENT — ENCOUNTER SYMPTOMS
TROUBLE SWALLOWING: 0
COUGH: 1
BACK PAIN: 0
CHEST TIGHTNESS: 1
VOMITING: 0
NAUSEA: 0
ABDOMINAL PAIN: 0
SHORTNESS OF BREATH: 1
SORE THROAT: 0
WHEEZING: 0
PHOTOPHOBIA: 0

## 2018-08-06 ASSESSMENT — PAIN DESCRIPTION - PAIN TYPE: TYPE: ACUTE PAIN

## 2018-08-06 ASSESSMENT — PAIN SCALES - GENERAL
PAINLEVEL_OUTOF10: 6
PAINLEVEL_OUTOF10: 9

## 2018-08-06 ASSESSMENT — PAIN DESCRIPTION - FREQUENCY: FREQUENCY: CONTINUOUS

## 2018-08-06 ASSESSMENT — PAIN DESCRIPTION - DESCRIPTORS: DESCRIPTORS: SQUEEZING

## 2018-08-06 ASSESSMENT — PAIN DESCRIPTION - LOCATION: LOCATION: CHEST

## 2018-08-06 ASSESSMENT — PAIN DESCRIPTION - ORIENTATION: ORIENTATION: LEFT

## 2018-08-06 NOTE — ED PROVIDER NOTES
Packs/day: 2.00     Years: 20.00     Types: Cigarettes     Quit date: 1/10/2010    Smokeless tobacco: Never Used    Alcohol use 0.0 oz/week      Comment: Socially    Drug use: No    Sexual activity: Not on file     Other Topics Concern    Not on file     Social History Narrative    No narrative on file       Family History   Problem Relation Age of Onset    Hypertension Mother     Heart Attack Mother     Heart Disease Mother     Hypertension Father     Heart Disease Father     Diabetes Other         cousin    Heart Disease Brother        Allergies:  Food; Penicillins; Parafon forte dsc [chlorzoxazone]; and Biaxin [clarithromycin]    Home Medications:  Prior to Admission medications    Medication Sig Start Date End Date Taking? Authorizing Provider   acetaminophen (TYLENOL) 500 MG tablet Take 2 tablets by mouth every 8 hours as needed for Pain 5/22/18  Yes Nikunj Smith DO   ibuprofen (ADVIL;MOTRIN) 800 MG tablet Take 1 tablet by mouth every 8 hours as needed for Pain 5/22/18  Yes Nikunj Smith DO   lithium 300 MG tablet Take 300 mg by mouth 3 times daily 450 mg am 900 mg pm   Yes Historical Provider, MD   albuterol (PROVENTIL) (2.5 MG/3ML) 0.083% nebulizer solution Take 3 mLs by nebulization every 4 hours as needed for Wheezing 7/11/16  Yes Pratik Mo MD   albuterol (PROVENTIL HFA) 108 (90 BASE) MCG/ACT inhaler Inhale 1-2 puffs into the lungs every 4 hours as needed for Wheezing or Shortness of Breath (Space out to every 6 hours as symptoms improve). Space out to every 6 hours as symptoms improve. 9/28/14  Yes Harlan Linton MD   ALPRAZolam Orbie Ruiz) 0.5 MG tablet Take 0.5 mg by mouth nightly as needed for Sleep. Yes Historical Provider, MD       REVIEW OF SYSTEMS    (2-9 systems for level 4, 10 or more for level 5)      Review of Systems   Constitutional: Negative for chills and fever. HENT: Negative for sore throat and trouble swallowing. Eyes: Negative for photophobia. Respiratory: Positive for cough, chest tightness and shortness of breath. Negative for wheezing. Cardiovascular: Positive for leg swelling. Negative for chest pain and palpitations. Gastrointestinal: Negative for abdominal pain, nausea and vomiting. Endocrine: Negative for polyuria. Genitourinary: Negative for dysuria and flank pain. Musculoskeletal: Negative for back pain and neck pain. Skin: Negative for rash and wound. Neurological: Negative for syncope, weakness, light-headedness and headaches. Psychiatric/Behavioral: Negative for agitation and confusion. PHYSICAL EXAM   (up to 7 for level 4, 8 or more for level 5)      INITIAL VITALS:   /73   Pulse 94   Temp 97.5 °F (36.4 °C) (Oral)   Resp 17   Ht 5' 7.5\" (1.715 m)   Wt (!) 331 lb 4.8 oz (150.3 kg)   LMP 05/05/2018   SpO2 93%   BMI 51.12 kg/m²     Physical Exam   Constitutional: She is oriented to person, place, and time. She appears well-developed and well-nourished. Morbidly obese. HENT:   Head: Normocephalic and atraumatic. Nose: Nose normal.   Mouth/Throat: Oropharynx is clear and moist.   Eyes: EOM are normal. Pupils are equal, round, and reactive to light. Neck: Normal range of motion. Neck supple. Cardiovascular: Normal rate, regular rhythm, normal heart sounds and intact distal pulses. Pulmonary/Chest: Breath sounds normal. No respiratory distress. She has no wheezes. She has no rales. Abdominal: Soft. Bowel sounds are normal. She exhibits no distension. There is no tenderness. Musculoskeletal: Normal range of motion. She exhibits no edema or deformity. Right calf tenderness. No pitting edema noted. Neurological: She is alert and oriented to person, place, and time. She has normal reflexes. Skin: Skin is warm and dry. No rash noted. No erythema. Psychiatric: She has a normal mood and affect.  Her behavior is normal.       DIFFERENTIAL  DIAGNOSIS     PLAN (LABS / IMAGING / EKG):  Orders NOT REPORTED     Platelet Estimate NOT REPORTED    D-DIMER, QUANTITATIVE   Result Value Ref Range    D-Dimer, Quant 0.77 mg/L FEU   POCT troponin   Result Value Ref Range    POC Troponin I 0.00 0.00 - 0.10 ng/mL    POC Troponin Interp       The Troponin-I (POC) results cannot be compared to the Troponin-T results. POCT troponin   Result Value Ref Range    POC Troponin I 0.00 0.00 - 0.10 ng/mL    POC Troponin Interp       The Troponin-I (POC) results cannot be compared to the Troponin-T results. EKG 12 Lead   Result Value Ref Range    Ventricular Rate 89 BPM    Atrial Rate 89 BPM    P-R Interval 138 ms    QRS Duration 88 ms    Q-T Interval 364 ms    QTc Calculation (Bazett) 442 ms    P Axis 34 degrees    R Axis 43 degrees    T Axis 41 degrees           RADIOLOGY:  CT CHEST PULMONARY EMBOLISM W CONTRAST   Preliminary Result   1. No evidence of pulmonary embolic disease. 2. No acute pulmonary findings. Stable right-sided pulmonary nodules. Stability has been documented for over 2 years and no follow-up imaging is   indicated. XR CHEST PORTABLE   Final Result   No acute cardiopulmonary disease. VL DUP LOWER EXTREMITY VENOUS BILATERAL    (Results Pending)       EKG  EKG Interpretation    Interpreted by me    Rhythm: normal sinus   Rate: normal  Axis: normal  Ectopy: none  Conduction: normal  ST Segments: no acute change  T Waves: no acute change  Q Waves: none    Clinical Impression: no acute changes and normal EKG    All EKG's are interpreted by the Emergency Department Physician who either signs or Co-signs this chart in the absence of a cardiologist.    EMERGENCY DEPARTMENT COURSE:  Patient no acute distress on arrival.  Vital signs are stable. She is nontoxic appearing. Patient with complaint of chest pain that is concerning for anginal type pain. Patient morbidly obese with multiple risk factors. Patient will likely be admitted for cardiac evaluation and possible stress testing.   Will

## 2018-08-06 NOTE — ED PROVIDER NOTES
[address]     Emergency Department     Faculty Attestation    I performed a history and physical examination of the patient and discussed management with the resident. I have reviewed and agree with the residents findings including all diagnostic interpretations, and treatment plans as written. Any areas of disagreement are noted on the chart. I was personally present for the key portions of any procedures. I have documented in the chart those procedures where I was not present during the key portions. I have reviewed the emergency nurses triage note. I agree with the chief complaint, past medical history, past surgical history, allergies, medications, social and family history as documented unless otherwise noted below. Documentation of the HPI, Physical Exam and Medical Decision Making performed by scribamol is based on my personal performance of the HPI, PE and MDM. For Physician Assistant/ Nurse Practitioner cases/documentation I have personally evaluated this patient and have completed at least one if not all key elements of the E/M (history, physical exam,                                     [address] [place] [address]     Emergency Department     Faculty Attestation    I performed a history and physical examination of the patient and discussed management with the resident. I have reviewed and agree with the residents findings including all diagnostic interpretations, and treatment plans as written. Any areas of disagreement are noted on the chart. I was personally present for the key portions of any procedures. I have documented in the chart those procedures where I was not present during the key portions. I have reviewed the emergency nurses triage note. I agree with the chief complaint, past medical history, past surgical history, allergies, medications, social and family history as documented unless otherwise noted below.  Documentation of the

## 2018-08-07 ENCOUNTER — APPOINTMENT (OUTPATIENT)
Dept: NUCLEAR MEDICINE | Age: 48
End: 2018-08-07
Payer: COMMERCIAL

## 2018-08-07 LAB
EKG ATRIAL RATE: 85 BPM
EKG ATRIAL RATE: 89 BPM
EKG P AXIS: 32 DEGREES
EKG P AXIS: 34 DEGREES
EKG P-R INTERVAL: 138 MS
EKG P-R INTERVAL: 152 MS
EKG Q-T INTERVAL: 364 MS
EKG Q-T INTERVAL: 388 MS
EKG QRS DURATION: 82 MS
EKG QRS DURATION: 88 MS
EKG QTC CALCULATION (BAZETT): 442 MS
EKG QTC CALCULATION (BAZETT): 461 MS
EKG R AXIS: 43 DEGREES
EKG R AXIS: 47 DEGREES
EKG T AXIS: 41 DEGREES
EKG T AXIS: 46 DEGREES
EKG VENTRICULAR RATE: 85 BPM
EKG VENTRICULAR RATE: 89 BPM

## 2018-08-07 PROCEDURE — 3430000000 HC RX DIAGNOSTIC RADIOPHARMACEUTICAL: Performed by: EMERGENCY MEDICINE

## 2018-08-07 PROCEDURE — 6360000002 HC RX W HCPCS: Performed by: EMERGENCY MEDICINE

## 2018-08-07 PROCEDURE — 2580000003 HC RX 258: Performed by: STUDENT IN AN ORGANIZED HEALTH CARE EDUCATION/TRAINING PROGRAM

## 2018-08-07 PROCEDURE — A9500 TC99M SESTAMIBI: HCPCS | Performed by: EMERGENCY MEDICINE

## 2018-08-07 PROCEDURE — G0378 HOSPITAL OBSERVATION PER HR: HCPCS

## 2018-08-07 PROCEDURE — 93005 ELECTROCARDIOGRAM TRACING: CPT

## 2018-08-07 PROCEDURE — 96374 THER/PROPH/DIAG INJ IV PUSH: CPT

## 2018-08-07 PROCEDURE — 94640 AIRWAY INHALATION TREATMENT: CPT

## 2018-08-07 PROCEDURE — 2580000003 HC RX 258: Performed by: EMERGENCY MEDICINE

## 2018-08-07 PROCEDURE — 96376 TX/PRO/DX INJ SAME DRUG ADON: CPT

## 2018-08-07 PROCEDURE — 6360000002 HC RX W HCPCS: Performed by: STUDENT IN AN ORGANIZED HEALTH CARE EDUCATION/TRAINING PROGRAM

## 2018-08-07 PROCEDURE — 78452 HT MUSCLE IMAGE SPECT MULT: CPT

## 2018-08-07 PROCEDURE — 6370000000 HC RX 637 (ALT 250 FOR IP): Performed by: STUDENT IN AN ORGANIZED HEALTH CARE EDUCATION/TRAINING PROGRAM

## 2018-08-07 PROCEDURE — 93017 CV STRESS TEST TRACING ONLY: CPT | Performed by: NURSE PRACTITIONER

## 2018-08-07 RX ORDER — MORPHINE SULFATE 4 MG/ML
4 INJECTION, SOLUTION INTRAMUSCULAR; INTRAVENOUS EVERY 4 HOURS PRN
Status: DISCONTINUED | OUTPATIENT
Start: 2018-08-07 | End: 2018-08-08 | Stop reason: HOSPADM

## 2018-08-07 RX ORDER — BACLOFEN 10 MG/1
10 TABLET ORAL 3 TIMES DAILY
COMMUNITY
End: 2019-05-07

## 2018-08-07 RX ORDER — AMINOPHYLLINE DIHYDRATE 25 MG/ML
100 INJECTION, SOLUTION INTRAVENOUS
Status: DISCONTINUED | OUTPATIENT
Start: 2018-08-07 | End: 2018-08-07

## 2018-08-07 RX ORDER — NITROGLYCERIN 0.4 MG/1
0.4 TABLET SUBLINGUAL EVERY 5 MIN PRN
Status: DISCONTINUED | OUTPATIENT
Start: 2018-08-07 | End: 2018-08-07

## 2018-08-07 RX ORDER — KETOROLAC TROMETHAMINE 15 MG/ML
15 INJECTION, SOLUTION INTRAMUSCULAR; INTRAVENOUS EVERY 6 HOURS PRN
Status: DISPENSED | OUTPATIENT
Start: 2018-08-07 | End: 2018-08-08

## 2018-08-07 RX ORDER — SODIUM CHLORIDE 9 MG/ML
INJECTION, SOLUTION INTRAVENOUS ONCE
Status: COMPLETED | OUTPATIENT
Start: 2018-08-07 | End: 2018-08-07

## 2018-08-07 RX ORDER — SODIUM CHLORIDE 0.9 % (FLUSH) 0.9 %
10 SYRINGE (ML) INJECTION PRN
Status: DISCONTINUED | OUTPATIENT
Start: 2018-08-07 | End: 2018-08-07

## 2018-08-07 RX ORDER — METOPROLOL TARTRATE 5 MG/5ML
2.5 INJECTION INTRAVENOUS PRN
Status: DISCONTINUED | OUTPATIENT
Start: 2018-08-07 | End: 2018-08-07

## 2018-08-07 RX ADMIN — BACLOFEN 10 MG: 10 TABLET ORAL at 06:07

## 2018-08-07 RX ADMIN — MORPHINE SULFATE 4 MG: 4 INJECTION INTRAVENOUS at 16:07

## 2018-08-07 RX ADMIN — SODIUM CHLORIDE, PRESERVATIVE FREE 10 ML: 5 INJECTION INTRAVENOUS at 12:25

## 2018-08-07 RX ADMIN — BACLOFEN 10 MG: 10 TABLET ORAL at 14:00

## 2018-08-07 RX ADMIN — VENLAFAXINE HYDROCHLORIDE 150 MG: 150 CAPSULE, EXTENDED RELEASE ORAL at 09:00

## 2018-08-07 RX ADMIN — MORPHINE SULFATE 4 MG: 4 INJECTION INTRAVENOUS at 21:17

## 2018-08-07 RX ADMIN — Medication 10 ML: at 12:08

## 2018-08-07 RX ADMIN — REGADENOSON 0.4 MG: 0.08 INJECTION, SOLUTION INTRAVENOUS at 12:23

## 2018-08-07 RX ADMIN — Medication 10 ML: at 21:17

## 2018-08-07 RX ADMIN — ONDANSETRON 4 MG: 4 TABLET, ORALLY DISINTEGRATING ORAL at 16:11

## 2018-08-07 RX ADMIN — SODIUM CHLORIDE: 9 INJECTION, SOLUTION INTRAVENOUS at 12:08

## 2018-08-07 RX ADMIN — TETRAKIS(2-METHOXYISOBUTYLISOCYANIDE)COPPER(I) TETRAFLUOROBORATE 42 MILLICURIE: 1 INJECTION, POWDER, LYOPHILIZED, FOR SOLUTION INTRAVENOUS at 12:25

## 2018-08-07 RX ADMIN — BACLOFEN 10 MG: 10 TABLET ORAL at 21:18

## 2018-08-07 RX ADMIN — LITHIUM CARBONATE 450 MG: 300 TABLET ORAL at 09:00

## 2018-08-07 RX ADMIN — LITHIUM CARBONATE 900 MG: 300 TABLET ORAL at 21:18

## 2018-08-07 RX ADMIN — ALBUTEROL SULFATE 2.5 MG: 2.5 SOLUTION RESPIRATORY (INHALATION) at 01:47

## 2018-08-07 ASSESSMENT — PAIN SCALES - GENERAL
PAINLEVEL_OUTOF10: 7
PAINLEVEL_OUTOF10: 9

## 2018-08-07 NOTE — PLAN OF CARE
Problem: Pain:  Goal: Pain level will decrease  Pain level will decrease   Outcome: Ongoing    Goal: Control of acute pain  Control of acute pain   Outcome: Ongoing    Goal: Control of chronic pain  Control of chronic pain   Outcome: Ongoing      Problem: Breathing Pattern - Ineffective:  Goal: Ability to achieve and maintain a regular respiratory rate will improve  Ability to achieve and maintain a regular respiratory rate will improve   Outcome: Ongoing

## 2018-08-07 NOTE — H&P
deficits   MUSCULOSKELETAL: no clubbing, cyanosis or edema. No calf tenderness bilaterally. No palpable cords bilaterally. No erythema or warmth to bilateral lower extremities. SKIN: no rash or wounds       DIFFERENTIAL DIAGNOSIS/MDM:   DDX: ACS, pleuritic chest pain, pneumonia, COPD exacerbation, asthma exacerbation    DIAGNOSTIC RESULTS     EKG: All EKG's are interpreted by the Observation Physician who either signs or Co-signs this chart in the absence of a cardiologist.    EKG Interpretation    Interpreted by observation physician    Rhythm: normal sinus   Rate: normal  Axis: normal  Ectopy: none  Conduction: normal  ST Segments: no acute change  T Waves: inversion in  v2  Q Waves: nonspecific    Clinical Impression: non-specific EKG    Maye Archer DO      RADIOLOGY:   I directly visualized the following  images and reviewed the radiologist interpretations:    Xr Chest Portable    Result Date: 8/6/2018  EXAMINATION: SINGLE XRAY VIEW OF THE CHEST 8/6/2018 2:06 pm COMPARISON: 03/02/2018. HISTORY: ORDERING SYSTEM PROVIDED HISTORY: chest pain TECHNOLOGIST PROVIDED HISTORY: Reason for exam:->chest pain FINDINGS: The cardiomediastinal silhouette is unremarkable. The lungs are clear. No infiltrate, pleural fluid or focal process is identified. No acute osseous findings. No acute cardiopulmonary disease. Ct Chest Pulmonary Embolism W Contrast    Result Date: 8/7/2018  EXAMINATION: CTA OF THE CHEST 8/6/2018 2:15 pm TECHNIQUE: CTA of the chest was performed after the administration of intravenous contrast.  Multiplanar reformatted images are provided for review. MIP images are provided for review. Dose modulation, iterative reconstruction, and/or weight based adjustment of the mA/kV was utilized to reduce the radiation dose to as low as reasonably achievable. COMPARISON: 03/02/2018.  HISTORY: ORDERING SYSTEM PROVIDED HISTORY: PE? FINDINGS: Pulmonary Arteries:  Pulmonary arteries are adequately +---------+----+-----------------------------------------------------------+ ! Other    !    !family history of DVT                                      ! +---------+----+-----------------------------------------------------------+   - The patient's risk factor(s) include: obesity. Velocities are measured in cm/s ; Diameters are measured in cm Right Lower Extremities DVT Study Measurements Right 2D Measurements +------------------------------------+----------+---------------+----------+ ! Location                            ! Visualized! Compressibility! Thrombosis! +------------------------------------+----------+---------------+----------+ ! Common Femoral                      !Yes       ! Yes            ! None      ! +------------------------------------+----------+---------------+----------+ ! Prox Femoral                        !Yes       ! Yes            ! None      ! +------------------------------------+----------+---------------+----------+ ! Mid Femoral                         !Yes       ! Yes            ! None      ! +------------------------------------+----------+---------------+----------+ ! Dist Femoral                        !Partial   !Yes            ! None      ! +------------------------------------+----------+---------------+----------+ ! Popliteal                           !Yes       ! Yes            ! None      ! +------------------------------------+----------+---------------+----------+ ! Sapheno Femoral Junction            ! Yes       ! Yes            ! None      ! +------------------------------------+----------+---------------+----------+ ! PTV                                 ! Yes       ! Yes            ! None      ! +------------------------------------+----------+---------------+----------+ ! Peroneal                            !Partial   !Yes            ! None      ! +------------------------------------+----------+---------------+----------+ ! Gastroc                             ! Yes       ! Yes            ! None ! +------------------------------------+----------+---------------+----------+ ! GSV Thigh                           ! Yes       ! Yes            ! None      ! +------------------------------------+----------+---------------+----------+ ! GSV Knee                            ! Yes       ! Yes            ! None      ! +------------------------------------+----------+---------------+----------+ ! GSV Ankle                           ! Yes       ! Yes            ! None      ! +------------------------------------+----------+---------------+----------+ ! SSV                                 ! Yes       ! Yes            ! None      ! +------------------------------------+----------+---------------+----------+ Right Doppler Measurements +---------------------------+------+------+--------------------------------+ ! Location                   ! Signal!Reflux! Reflux (msec)                   ! +---------------------------+------+------+--------------------------------+ ! Common Femoral             !Phasic!      !                                ! +---------------------------+------+------+--------------------------------+ ! Prox Femoral               !Phasic!      !                                ! +---------------------------+------+------+--------------------------------+ ! Popliteal                  !Phasic!      !                                ! +---------------------------+------+------+--------------------------------+ Left Lower Extremities DVT Study Measurements Left 2D Measurements +------------------------------------+----------+---------------+----------+ ! Location                            ! Visualized! Compressibility! Thrombosis! +------------------------------------+----------+---------------+----------+ ! Common Femoral                      !Yes       ! Yes            ! None      ! +------------------------------------+----------+---------------+----------+ ! Prox Femoral                        !Yes       ! Yes            ! None      !

## 2018-08-07 NOTE — CONSULTS
upper chest   Heart:  Regular rate and rhythm, S1, S2 normal, no murmur, rub or gallop   Abdomen:   Soft, non-tender, bowel sounds active all four quadrants,  no masses, no organomegaly   Extremities: Trace edema without tenderness   Pulses: 2+ and symmetric   Skin: Skin color, texture, turgor normal, no rashes or lesions   Pysch: Normal mood and affect   Neurologic: Normal gross motor and sensory exam.         Labs  CBC:   Lab Results   Component Value Date    WBC 8.5 08/06/2018    RBC 5.00 08/06/2018    HGB 15.4 08/06/2018    HCT 46.1 08/06/2018    MCV 92.2 08/06/2018    RDW 13.1 08/06/2018     08/06/2018     CMP:    Lab Results   Component Value Date     08/06/2018    K 4.2 08/06/2018     08/06/2018    CO2 25 08/06/2018    BUN 5 08/06/2018    CREATININE 0.54 08/06/2018    GFRAA >60 08/06/2018    LABGLOM >60 08/06/2018    GLUCOSE 95 08/06/2018    PROT 6.7 07/09/2016    CALCIUM 9.8 08/06/2018    BILITOT 0.30 07/09/2016    ALKPHOS 52 07/09/2016    AST 25 07/09/2016    ALT 23 07/09/2016     Lab Results   Component Value Date    CKTOTAL 35 10/23/2014    CKTOTAL 93 04/20/2014    CKTOTAL 52 05/02/2013    CKMB 1.0 10/23/2014    CKMB 1.5 04/20/2014    CKMB 0.7 05/02/2013    TROPONINT <0.03 07/08/2016    TROPONINT <0.03 06/06/2016    TROPONINT <0.03 12/08/2014       EKG:  I have reviewed EKG with the following interpretation:  Impression:  Normal    Assessment  1. Chest pain with both typical and atypical features  2. Essential hypertension  3. Morbid obesity  4. Asthma/COPD, stable  5. Bipolar disorder  6. Family history of coronary artery disease, father  9. History of smoking in the past. Quit many years ago. Plan:    I had the opportunity to review the clinical symptoms and presentation of Regina Kee. · Her EKG and enzymes are negative. There is no evidence of acute myocardial infarction. · Her chest pain does carry both typical and atypical features.  The typical aspect is a squeezing nature and radiating to her left shoulder and left neck, however the atypical feature is the severity of the pain with deep inspiration as well as the point tenderness in the left chest at the location where her maximum pain is. · At this day she is scheduled for a stress test this morning. This will be compared to the one obtained in 2014. If no ischemia is detected and appears similar to that from her prior evaluation, with suggest no further invasive testing and she could be discharged with recommendations of using NSAID's for the next week or so for a presumed pleuritic pain. · If she is discharged she should follow up as outpatient in the next 2 weeks for confirmation of resolution of her symptoms. · Cardiac catheterization will be contemplated if her symptoms become more typical of angina, her stress test is high risk abnormal, or if her symptoms persist and are significantly debilitating. Thank you for allowing to us to participate in the [facility] or [facility]. Further evaluation will be based upon the patient's clinical course and testing results. All questions and concerns were addressed to the patient/family. Alternatives to my treatment were discussed. The note was completed using EMR. Every effort was made to ensure accuracy; however, inadvertent computerized transcription errors may be present.         Electronically signed by Melanie Cerrato MD on 8/7/2018 at 11:43 AM

## 2018-08-07 NOTE — FLOWSHEET NOTE
visited pt during rounding. Pt told  \"I don't want to see you. \"  Irene Beat ask if she wanted her name off the list.  Pt stated, \"I said I don't want to see you. I told them that when I came here. \"

## 2018-08-08 VITALS
OXYGEN SATURATION: 95 % | HEIGHT: 68 IN | BODY MASS INDEX: 44.41 KG/M2 | RESPIRATION RATE: 18 BRPM | HEART RATE: 83 BPM | WEIGHT: 293 LBS | SYSTOLIC BLOOD PRESSURE: 139 MMHG | TEMPERATURE: 97.2 F | DIASTOLIC BLOOD PRESSURE: 74 MMHG

## 2018-08-08 PROBLEM — R07.9 CHEST PAIN: Status: RESOLVED | Noted: 2018-08-06 | Resolved: 2018-08-08

## 2018-08-08 LAB
ESTIMATED AVERAGE GLUCOSE: 103 MG/DL
HBA1C MFR BLD: 5.2 % (ref 4–6)
LV EF: 53 %
LVEF MODALITY: NORMAL

## 2018-08-08 PROCEDURE — 2580000003 HC RX 258: Performed by: EMERGENCY MEDICINE

## 2018-08-08 PROCEDURE — 6370000000 HC RX 637 (ALT 250 FOR IP): Performed by: STUDENT IN AN ORGANIZED HEALTH CARE EDUCATION/TRAINING PROGRAM

## 2018-08-08 PROCEDURE — 6360000002 HC RX W HCPCS: Performed by: EMERGENCY MEDICINE

## 2018-08-08 PROCEDURE — 3430000000 HC RX DIAGNOSTIC RADIOPHARMACEUTICAL: Performed by: EMERGENCY MEDICINE

## 2018-08-08 PROCEDURE — 2580000003 HC RX 258: Performed by: STUDENT IN AN ORGANIZED HEALTH CARE EDUCATION/TRAINING PROGRAM

## 2018-08-08 PROCEDURE — 96375 TX/PRO/DX INJ NEW DRUG ADDON: CPT

## 2018-08-08 PROCEDURE — G0378 HOSPITAL OBSERVATION PER HR: HCPCS

## 2018-08-08 PROCEDURE — 96376 TX/PRO/DX INJ SAME DRUG ADON: CPT

## 2018-08-08 PROCEDURE — A9500 TC99M SESTAMIBI: HCPCS | Performed by: EMERGENCY MEDICINE

## 2018-08-08 PROCEDURE — 6370000000 HC RX 637 (ALT 250 FOR IP): Performed by: EMERGENCY MEDICINE

## 2018-08-08 RX ORDER — SODIUM CHLORIDE 0.9 % (FLUSH) 0.9 %
10 SYRINGE (ML) INJECTION PRN
Status: DISCONTINUED | OUTPATIENT
Start: 2018-08-08 | End: 2018-08-08 | Stop reason: HOSPADM

## 2018-08-08 RX ORDER — PROMETHAZINE HYDROCHLORIDE 25 MG/ML
12.5 INJECTION, SOLUTION INTRAMUSCULAR; INTRAVENOUS EVERY 6 HOURS PRN
Status: DISCONTINUED | OUTPATIENT
Start: 2018-08-08 | End: 2018-08-08 | Stop reason: HOSPADM

## 2018-08-08 RX ORDER — ALPRAZOLAM 0.5 MG/1
0.5 TABLET ORAL NIGHTLY PRN
Qty: 15 TABLET | Refills: 0 | Status: SHIPPED | OUTPATIENT
Start: 2018-08-08 | End: 2018-09-07

## 2018-08-08 RX ORDER — ALPRAZOLAM 0.5 MG/1
0.5 TABLET ORAL ONCE
Status: COMPLETED | OUTPATIENT
Start: 2018-08-08 | End: 2018-08-08

## 2018-08-08 RX ORDER — ONDANSETRON 4 MG/1
4 TABLET, FILM COATED ORAL ONCE
Status: COMPLETED | OUTPATIENT
Start: 2018-08-08 | End: 2018-08-08

## 2018-08-08 RX ADMIN — PROMETHAZINE HYDROCHLORIDE 12.5 MG: 25 INJECTION INTRAMUSCULAR; INTRAVENOUS at 12:39

## 2018-08-08 RX ADMIN — Medication 10 ML: at 08:32

## 2018-08-08 RX ADMIN — MORPHINE SULFATE 4 MG: 4 INJECTION INTRAVENOUS at 06:18

## 2018-08-08 RX ADMIN — VENLAFAXINE HYDROCHLORIDE 150 MG: 150 CAPSULE, EXTENDED RELEASE ORAL at 08:32

## 2018-08-08 RX ADMIN — SODIUM CHLORIDE, PRESERVATIVE FREE 10 ML: 5 INJECTION INTRAVENOUS at 07:35

## 2018-08-08 RX ADMIN — ONDANSETRON 4 MG: 4 TABLET, ORALLY DISINTEGRATING ORAL at 11:23

## 2018-08-08 RX ADMIN — MORPHINE SULFATE 4 MG: 4 INJECTION INTRAVENOUS at 11:23

## 2018-08-08 RX ADMIN — MORPHINE SULFATE 4 MG: 4 INJECTION INTRAVENOUS at 02:00

## 2018-08-08 RX ADMIN — ALPRAZOLAM 0.5 MG: 0.5 TABLET ORAL at 09:10

## 2018-08-08 RX ADMIN — ONDANSETRON 4 MG: 4 TABLET, ORALLY DISINTEGRATING ORAL at 05:07

## 2018-08-08 RX ADMIN — BACLOFEN 10 MG: 10 TABLET ORAL at 08:32

## 2018-08-08 RX ADMIN — LITHIUM CARBONATE 450 MG: 300 TABLET ORAL at 09:10

## 2018-08-08 RX ADMIN — TETRAKIS(2-METHOXYISOBUTYLISOCYANIDE)COPPER(I) TETRAFLUOROBORATE 43 MILLICURIE: 1 INJECTION, POWDER, LYOPHILIZED, FOR SOLUTION INTRAVENOUS at 07:35

## 2018-08-08 RX ADMIN — ONDANSETRON HYDROCHLORIDE 4 MG: 4 TABLET, FILM COATED ORAL at 12:39

## 2018-08-08 ASSESSMENT — PAIN SCALES - GENERAL
PAINLEVEL_OUTOF10: 7
PAINLEVEL_OUTOF10: 8
PAINLEVEL_OUTOF10: 7

## 2018-08-08 NOTE — DISCHARGE SUMMARY
CDU Discharge Summary        Patient:  Nancy Peguero  YOB: 1970    MRN: [id number]   Acct: [de-identified]    Primary Care Physician: Johnny Suresh    Admit date:  8/6/2018  1:08 PM  Discharge date: 8/8/2018  4:47 PM    Discharge Diagnoses:     1.) Acute onset of left-sided chest pain, etiology unknown, cardiology consulted, pain controlled with by mouth Tylenol    -Troponins and chest x-ray negative and ED  -EKG showed normal sinus rhythm with T-wave inversion in lead V2  -BNP negative  -Cardiology consulted who recommended cardiac stress test and further recommendations following the results of these tests. Due to patient's weight the stress test will be 2 days in length  -2nd part of stress test showed tiny apical wall infarct which was seen on last stress test in 2014. No reversible ischemia seen and was deemed low risk.     Acute onset of shortness of breath with Chronic past medical history of asthma and COPD, etiology unknown, suspicion linked to cardiac origin on the cardiology consulted  -Patient states that she had been taking her inhalers as directed and has not needed a rescue inhaler more than usual since the pain started  -Home meds of Proventil inhaler will be restated at home. Follow-up:  Call today/tomorrow for a follow up appointment with Johnny Suresh , or return to the Emergency Room with worsening symptoms    Stressed to patient the importance of following up with primary care doctor for further workup/management of symptoms. Pt verbalizes understanding and agrees with plan. Discharge Medication Changes:       Medication List      CHANGE how you take these medications    * ALPRAZolam 0.5 MG tablet  Commonly known as:  Christian Ramírez  What changed:  Another medication with the same name was added. Make sure you understand how and when to take each.      * ALPRAZolam 0.5 MG tablet  Commonly known as:  XANAX  Take 1 tablet by mouth nightly as needed for Sleep for up to 30 Absolute Eos # 1.09 (H) 0.00 - 0.44 k/uL    Basophils # 0.05 0.00 - 0.20 k/uL    Absolute Immature Granulocyte 0.03 0.00 - 0.30 k/uL    WBC Morphology NOT REPORTED     RBC Morphology NOT REPORTED     Platelet Estimate NOT REPORTED    D-DIMER, QUANTITATIVE   Result Value Ref Range    D-Dimer, Quant 0.77 mg/L FEU   Hemoglobin A1C   Result Value Ref Range    Hemoglobin A1C 5.2 4.0 - 6.0 %    Estimated Avg Glucose 103 mg/dL   POCT troponin   Result Value Ref Range    POC Troponin I 0.00 0.00 - 0.10 ng/mL    POC Troponin Interp       The Troponin-I (POC) results cannot be compared to the Troponin-T results. POCT troponin   Result Value Ref Range    POC Troponin I 0.00 0.00 - 0.10 ng/mL    POC Troponin Interp       The Troponin-I (POC) results cannot be compared to the Troponin-T results. EKG 12 Lead   Result Value Ref Range    Ventricular Rate 89 BPM    Atrial Rate 89 BPM    P-R Interval 138 ms    QRS Duration 88 ms    Q-T Interval 364 ms    QTc Calculation (Bazett) 442 ms    P Axis 34 degrees    R Axis 43 degrees    T Axis 41 degrees   EKG 12 Lead   Result Value Ref Range    Ventricular Rate 85 BPM    Atrial Rate 85 BPM    P-R Interval 152 ms    QRS Duration 82 ms    Q-T Interval 388 ms    QTc Calculation (Bazett) 461 ms    P Axis 32 degrees    R Axis 47 degrees    T Axis 46 degrees     Xr Chest Portable    Result Date: 8/6/2018  EXAMINATION: SINGLE XRAY VIEW OF THE CHEST 8/6/2018 2:06 pm COMPARISON: 03/02/2018. HISTORY: ORDERING SYSTEM PROVIDED HISTORY: chest pain TECHNOLOGIST PROVIDED HISTORY: Reason for exam:->chest pain FINDINGS: The cardiomediastinal silhouette is unremarkable. The lungs are clear. No infiltrate, pleural fluid or focal process is identified. No acute osseous findings. No acute cardiopulmonary disease.      Ct Chest Pulmonary Embolism W Contrast    Result Date: 8/7/2018  EXAMINATION: CTA OF THE CHEST 8/6/2018 2:15 pm TECHNIQUE: CTA of the chest was performed after the administration of Incidental finding of a fluid        Incidental finding of a fluid  collection measuring . 82 cm x 3.5    collection measuring . 80 cm x 4.35  cm.                                  cm.  Risk Factors History +---------+----+-----------------------------------------------------------+ ! Diagnosis! Date! Comments                                                   ! +---------+----+-----------------------------------------------------------+ ! Other    !    !family history of DVT                                      ! +---------+----+-----------------------------------------------------------+   - The patient's risk factor(s) include: obesity. Velocities are measured in cm/s ; Diameters are measured in cm Right Lower Extremities DVT Study Measurements Right 2D Measurements +------------------------------------+----------+---------------+----------+ ! Location                            ! Visualized! Compressibility! Thrombosis! +------------------------------------+----------+---------------+----------+ ! Common Femoral                      !Yes       ! Yes            ! None      ! +------------------------------------+----------+---------------+----------+ ! Prox Femoral                        !Yes       ! Yes            ! None      ! +------------------------------------+----------+---------------+----------+ ! Mid Femoral                         !Yes       ! Yes            ! None      ! +------------------------------------+----------+---------------+----------+ ! Dist Femoral                        !Partial   !Yes            ! None      ! +------------------------------------+----------+---------------+----------+ ! Popliteal                           !Yes       ! Yes            ! None      ! +------------------------------------+----------+---------------+----------+ ! Sapheno Femoral Junction            ! Yes       ! Yes            ! None      ! +------------------------------------+----------+---------------+----------+ ! PTV +------------------------------------+----------+---------------+----------+ ! SSV                                 ! Yes       ! Yes            ! None      ! +------------------------------------+----------+---------------+----------+ Left Doppler Measurements +---------------------------+------+------+--------------------------------+ ! Location                   ! Signal!Reflux! Reflux (msec)                   ! +---------------------------+------+------+--------------------------------+ ! Common Femoral             !Phasic!      !                                ! +---------------------------+------+------+--------------------------------+ ! Prox Femoral               !Phasic!      !                                ! +---------------------------+------+------+--------------------------------+ ! Popliteal                  !Phasic!      !                                ! +---------------------------+------+------+--------------------------------+          Physical Exam:    General appearance - NAD, AOx 3   Lungs -CTAB, no R/R/R  Heart - RRR, no M/R/G  Abdomen - Soft, NT/ND  Neurological:  MAEx4, No focal motor deficit, sensory loss  Extremities - Cap refil <2 sec in all ext., no edema  Skin -warm, dry      Hospital Course:  Clinical course has improved, labs and imaging reviewed. Filiberto Hamman originally presented to the hospital on 8/6/2018  1:08 PM with sudden onset of squeezing left-sided chest pain that radiates into her jaw and left shoulder and arm. Patient states that she has had accompanying shortness of breath and nonproductive cough with this but feels that this is a separate issue due to her history of asthma and COPD. The pain awoke her from her sleep around 3 in the morning the other day. Nothing patient does typically makes the chest pain better or worse. .  At that time it was determined that She required further observation and cardiology consultation and further testing.  Labs and imaging were followed daily.  Imaging results as above. She is medically stable to be discharged. Disposition: Home    Patient stated that they will not drive themselves home from the hospital if they have gotten pain killers/ narcotics earlier that day and that they will arrange for transportation on their own or work with the  for a ride. Patient counseled NOT to drive while under the influence of narcotics/ pain killers. Condition: Good    Patient stable and ready for discharge home. I have discussed plan of care with patient and they are in understanding. They were instructed to read discharge paperwork. All of their questions and concerns were addressed. Time Spent: 0 day      --  Corina Mcintyre,   Emergency Medicine Resident Physician    This dictation was generated by voice recognition computer software. Although all attempts are made to edit the dictation for accuracy, there may be errors in the transcription that are not intended.

## 2018-08-09 NOTE — PROGRESS NOTES
[address] [facility]  CDU / OBSERVATION eNCOUnter  Attending NOte       I performed a history and physical examination of the patient and discussed management with the resident. I reviewed the residents note and agree with the documented findings and plan of care. Any areas of disagreement are noted on the chart. I was personally present for the key portions of any procedures. I have documented in the chart those procedures where I was not present during the key portions. I have reviewed the nurses notes. I agree with the chief complaint, past medical history, past surgical history, allergies, medications, social and family history as documented unless otherwise noted below. The Family history, social history, and ROS are effectively unchanged since admission unless noted elsewhere in the chart. Awaiting imaging portion of stress test today. Patient had symptoms relatively typical for angina. Seen by cardiology. Stress test has been ordered and disposition will depend on results. Some complaints of anxiety. Discussed with patient ongoing treatment of anxiety. Patient does have PCP to follow with his well.     Rell Nelson MD  Attending Emergency  Physician
CDU Daily Progress Note  Attending Physician       Pt Name: Isabella Carl  MRN: [id number]  [place] 1970  Date of evaluation: 8/7/18    I performed a history and physical examination of the patient and discussed management with the resident. I reviewed the residents note and agree with the documented findings and plan of care. Any areas of disagreement are noted on the chart. I was personally present for the key portions of any procedures. I have documented in the chart those procedures where I was not present during the key portions. I have reviewed the emergency nurses triage note. I agree with the chief complaint, past medical history, past surgical history, allergies, medications, social and family history as documented unless otherwise noted below. Documentation of the HPI, Physical Exam and Medical Decision Making performed by medical students or scribes is based on my personal performance of the HPI, PE and MDM. For Physician Assistant/ Nurse Practitioner cases/documentation I have personally evaluated this patient and have completed at least one if not all key elements of the E/M (history, physical exam, and MDM). Additional findings are as noted. The Family History, Social History and Review of Systems are unchanged from the previous day. No significant events overnight. Former smoker. Not diabetic. Stress test and cardiology consult ordered as patient has significant family history and has had recent abnormal EKG. Sounds like she has also had some abnormal testing back in 2014 that she did not follow-up on.     Dakota Lugo MD  Attending Physician  Critical Decision Unit
Pt upset she is off her med schedule-Pt instructed this nurse to leave room,close the door and bring her medications when they come up. This RN complied.
Section of Cardiology  Progress Note      Date:  8/8/2018  Patient: Terrence Jarrell  Admission:  8/6/2018  1:08 PM  Admit DX: Chest pain [R07.9]  Age:  52 y.o., 1970     LOS: 0 days     Reason for evaluation:   CHEST PAIN    SUBJECTIVE:     The patient was seen and examined. Notes and labs reviewed. There were not complications over night. Patient's cardiac review of systems: negative. The patient is generally feeling unchanged. OBJECTIVE:      EXAM:   Vitals:    VITALS:  /74   Pulse 83   Temp 97.2 °F (36.2 °C) (Oral)   Resp 18   Ht 5' 7.5\" (1.715 m)   Wt (!) 331 lb 4.8 oz (150.3 kg)   LMP 05/05/2018   SpO2 95%   BMI 51.12 kg/m²   24HR INTAKE/OUTPUT:  No intake or output data in the 24 hours ending 08/08/18 1228    CONSTITUTIONAL:  awake, alert, cooperative, no apparent distress, and appears stated age. HEENT: Normal jugular venous pulsations, no carotid bruits. Head is atraumatic, normocephalic. Eyes and oral mucosa are normal.  LUNGS: Good respiratory effort On auscultation: clear to auscultation bilaterally  CARDIOVASCULAR:  Normal apical impulse, regular rate and rhythm, normal S1 and S2, no S3 or S4, and no murmur or rub noted. dorsalis pedis and bilateralpresent 2+  ABDOMEN: Soft, nontender, nondistended. Bowel sounds present. No masses or tenderness. No bruit. SKIN: Warm and dry. EXTREMITIES:No lower extremity edema. Motor movement grossly intact. No cyanosis or clubbing.     Current Inpatient Medications:   ondansetron  4 mg Oral Once    topiramate  50 mg Oral BID    carvedilol  6.25 mg Oral BID WC    aspirin  81 mg Oral Daily    pantoprazole  40 mg Oral QAM AC    amLODIPine  5 mg Oral Daily    hydrochlorothiazide  12.5 mg Oral Daily    sodium chloride flush  10 mL Intravenous 2 times per day    lithium  900 mg Oral Nightly    lithium  450 mg Oral Daily    baclofen  10 mg Oral TID    venlafaxine  150 mg Oral Daily with breakfast       IV Infusions (if any):  
chest CT showed a stable right pulmonary nodule with no evidence of pulmonary embolism  -Venous Doppler bilateral lower extremities revealed no evidence of superficial or deep venous thrombosis. · Continue home medications  · Monitor vitals, labs, and imaging  · DISPO: pending consults and clinical improvement    --  [facility]  Emergency Medicine Resident Physician     This dictation was generated by voice recognition computer software. Although all attempts are made to edit the dictation for accuracy, there may be errors in the transcription that are not intended.

## 2018-10-16 ENCOUNTER — HOSPITAL ENCOUNTER (EMERGENCY)
Age: 48
Discharge: HOME | End: 2018-10-16
Attending: EMERGENCY MEDICINE
Payer: MEDICAID

## 2018-10-16 VITALS
TEMPERATURE: 97.7 F | BODY MASS INDEX: 44.41 KG/M2 | HEART RATE: 83 BPM | RESPIRATION RATE: 16 BRPM | DIASTOLIC BLOOD PRESSURE: 98 MMHG | WEIGHT: 293 LBS | OXYGEN SATURATION: 94 % | HEIGHT: 68 IN | SYSTOLIC BLOOD PRESSURE: 149 MMHG

## 2018-10-16 DIAGNOSIS — M54.32 BILATERAL SCIATICA: ICD-10-CM

## 2018-10-16 DIAGNOSIS — G89.29 ACUTE EXACERBATION OF CHRONIC LOW BACK PAIN: Primary | ICD-10-CM

## 2018-10-16 DIAGNOSIS — M54.50 ACUTE EXACERBATION OF CHRONIC LOW BACK PAIN: Primary | ICD-10-CM

## 2018-10-16 DIAGNOSIS — M54.31 BILATERAL SCIATICA: ICD-10-CM

## 2018-10-16 PROCEDURE — 99283 EMERGENCY DEPT VISIT LOW MDM: CPT

## 2018-10-16 PROCEDURE — 6370000000 HC RX 637 (ALT 250 FOR IP): Performed by: STUDENT IN AN ORGANIZED HEALTH CARE EDUCATION/TRAINING PROGRAM

## 2018-10-16 RX ORDER — PREDNISONE 10 MG/1
TABLET ORAL
Qty: 12 TABLET | Refills: 0 | Status: SHIPPED | OUTPATIENT
Start: 2018-10-16 | End: 2018-11-19 | Stop reason: ALTCHOICE

## 2018-10-16 RX ORDER — PREDNISONE 20 MG/1
60 TABLET ORAL ONCE
Status: COMPLETED | OUTPATIENT
Start: 2018-10-16 | End: 2018-10-16

## 2018-10-16 RX ADMIN — PREDNISONE 60 MG: 20 TABLET ORAL at 04:15

## 2018-10-16 ASSESSMENT — ENCOUNTER SYMPTOMS
SHORTNESS OF BREATH: 0
VOMITING: 0
BACK PAIN: 1
ABDOMINAL PAIN: 0
CHEST TIGHTNESS: 0
EYE DISCHARGE: 0
NAUSEA: 0
EYE REDNESS: 0
COLOR CHANGE: 0

## 2018-10-16 ASSESSMENT — PAIN DESCRIPTION - PAIN TYPE: TYPE: ACUTE PAIN

## 2018-10-16 ASSESSMENT — PAIN DESCRIPTION - PROGRESSION: CLINICAL_PROGRESSION: GRADUALLY WORSENING

## 2018-10-16 ASSESSMENT — PAIN DESCRIPTION - FREQUENCY: FREQUENCY: CONTINUOUS

## 2018-10-16 ASSESSMENT — PAIN DESCRIPTION - DESCRIPTORS: DESCRIPTORS: ACHING

## 2018-10-16 ASSESSMENT — PAIN SCALES - GENERAL: PAINLEVEL_OUTOF10: 9

## 2018-10-16 ASSESSMENT — PAIN DESCRIPTION - ORIENTATION: ORIENTATION: RIGHT;LEFT;LOWER

## 2018-10-16 ASSESSMENT — PAIN DESCRIPTION - LOCATION: LOCATION: BACK;HIP;KNEE

## 2018-10-16 ASSESSMENT — PAIN DESCRIPTION - ONSET: ONSET: ON-GOING

## 2018-10-16 NOTE — ED PROVIDER NOTES
[facility] ED     Emergency Department     Faculty Attestation        I performed a history and physical examination of the patient and discussed management with the resident. I reviewed the residents note and agree with the documented findings and plan of care. Any areas of disagreement are noted on the chart. I was personally present for the key portions of any procedures. I have documented in the chart those procedures where I was not present during the key portions. I have reviewed the emergency nurses triage note. I agree with the chief complaint, past medical history, past surgical history, allergies, medications, social and family history as documented unless otherwise noted below. For Physician Assistant/ Nurse Practitioner cases/documentation I have personally evaluated this patient and have completed at least one if not all key elements of the E/M (history, physical exam, and MDM). Additional findings are as noted. Vital Signs: BP: (!) 154/100  Pulse: 97  Resp: 20  Temp: 97.7 °F (36.5 °C) SpO2: 93 %  PCP:  Yaquelin Mejia    Pertinent Comments:         Critical Care  None      (Please note that portions of this note were completed with a voice recognition program. Efforts were made to edit the dictations but occasionally words are mis-transcribed.  Whenever words are used in this note in any gender, they shall be construed as though they were used in the gender appropriate to the circumstances; and whenever words are used in this note in the singular or plural form, they shall be construed as though they were used in the form appropriate to the circumstances.)    MD Latha Mackey  Attending Emergency Medicine Physician            Akshat Estrada MD  10/16/18 1454

## 2018-10-16 NOTE — ED PROVIDER NOTES
[address] [facility]  ED  Emergency Department Encounter  Emergency Medicine Resident     Pt Name: Kael Serrano  MRN: [id number]  [place] 1970  Date of evaluation: 10/16/18  PCP:  Michael Fofana       Chief Complaint   Patient presents with    Back Pain    Knee Pain    Hip Pain     bilateral, degenerative disk disease       HISTORY OFPRESENT ILLNESS  (Location/Symptom, Timing/Onset, Context/Setting, Quality, Duration, Modifying Factors,Severity.)      Kael Serrano is a 51 yo female who presents with Acute on chronic back and knee pain. States this is really years is getting acutely worse last to 3 days. She states in the past she has received steroids with good pain relief. She cannot take NSAIDs reportedly because the NSAIDs interfere with her lithium level reportedly. She denies any numbness, tingling, lower extremity weakness, saddle anesthesia, loss of bowel or bladder incontinence. Denies any history of trauma or new injury. PAST MEDICAL / SURGICAL / SOCIAL / FAMILY HISTORY      has a past medical history of Acute exacerbation of COPD with asthma ([facility] [address] Anxiety; Asthma; Benign essential HTN; Bipolar 1 disorder ([facility] [address] COPD (chronic obstructive pulmonary disease) ([facility] [address] Depression; Fibromyalgia; Headache(784.0); Pneumonia due to infectious organism; and Post traumatic stress disorder. has a past surgical history that includes Tonsillectomy and Cholecystectomy. Social History     Social History    Marital status:      Spouse name: N/A    Number of children: N/A    Years of education: N/A     Occupational History    Not on file.      Social History Main Topics    Smoking status: Former Smoker     Packs/day: 2.00     Years: 20.00     Types: Cigarettes     Quit date: 1/10/2010    Smokeless tobacco: Never Used    Alcohol use No    Drug use: No    Sexual activity: Not on file     Other Topics Concern    Not on file     Social History Narrative    No narrative on file       Family History   Problem Relation Age of Onset    Hypertension Mother     Heart Attack Mother     Heart Disease Mother     Hypertension Father     Heart Disease Father     Diabetes Other         cousin    Heart Disease Brother        Allergies:  Food; Penicillins; Parafon forte dsc [chlorzoxazone]; and Biaxin [clarithromycin]    Home Medications:  Prior to Admission medications    Medication Sig Start Date End Date Taking? Authorizing Provider   predniSONE (DELTASONE) 10 MG tablet Take 4 tablets by mouth once daily for 3 days 10/16/18  Yes Vinita Homer, DO   baclofen (LIORESAL) 10 MG tablet Take 10 mg by mouth 3 times daily    Historical Provider, MD   acetaminophen (TYLENOL) 500 MG tablet Take 2 tablets by mouth every 8 hours as needed for Pain 5/22/18   Clarissa Macedonian, DO   ibuprofen (ADVIL;MOTRIN) 800 MG tablet Take 1 tablet by mouth every 8 hours as needed for Pain 5/22/18   Clarissa Macedonian, DO   lithium 300 MG tablet Take 300 mg by mouth 3 times daily 450 mg am 900 mg pm    Historical Provider, MD   albuterol (PROVENTIL) (2.5 MG/3ML) 0.083% nebulizer solution Take 3 mLs by nebulization every 4 hours as needed for Wheezing 7/11/16   Delphine Jane MD   albuterol (PROVENTIL HFA) 108 (90 BASE) MCG/ACT inhaler Inhale 1-2 puffs into the lungs every 4 hours as needed for Wheezing or Shortness of Breath (Space out to every 6 hours as symptoms improve). Space out to every 6 hours as symptoms improve. 9/28/14   Abby Lopez MD   ALPRAZolam Andrea De La Vega) 0.5 MG tablet Take 0.5 mg by mouth nightly as needed for Sleep. Historical Provider, MD       REVIEW OF SYSTEMS    (2-9 systems for level 4, 10 or more for level 5)      Review of Systems   Constitutional: Negative for chills and fever. Eyes: Negative for discharge and redness. Respiratory: Negative for chest tightness and shortness of breath. Cardiovascular: Negative for chest pain and palpitations. Gastrointestinal: Negative for abdominal pain, nausea and vomiting. Genitourinary: Negative for dysuria and flank pain. Musculoskeletal: Positive for back pain. Negative for arthralgias and myalgias. Skin: Negative for color change and rash. Neurological: Negative for weakness and headaches. Psychiatric/Behavioral: Negative for agitation and confusion. PHYSICAL EXAM   (up to 7 for level 4, 8 or more for level 5)     INITIAL VITALS:    height is 5' 8\" (1.727 m) and weight is 332 lb (150.6 kg) (abnormal). Her oral temperature is 97.7 °F (36.5 °C). Her blood pressure is 149/98 (abnormal) and her pulse is 83. Her respiration is 16 and oxygen saturation is 94%. Physical Exam   Constitutional: She is oriented to person, place, and time. She appears well-developed and well-nourished. HENT:   Head: Normocephalic and atraumatic. Eyes: Pupils are equal, round, and reactive to light. Conjunctivae are normal. No scleral icterus. Cardiovascular: Normal rate, regular rhythm and normal heart sounds. Exam reveals no gallop and no friction rub. No murmur heard. Pulmonary/Chest: Effort normal and breath sounds normal. No respiratory distress. She has no wheezes. She has no rales. Abdominal: Soft. Bowel sounds are normal. She exhibits no distension and no mass. There is no tenderness. There is no rebound and no guarding. Musculoskeletal: Normal range of motion. paraspinal tenderness to palpation. Neurological: She is alert and oriented to person, place, and time. Skin: Skin is warm and dry. No rash noted. No erythema. Psychiatric: She has a normal mood and affect. Her behavior is normal.   Nursing note and vitals reviewed. DIFFERENTIAL  DIAGNOSIS     PLAN (LABS / IMAGING / EKG):  No orders of the defined types were placed in this encounter.       MEDICATIONS ORDERED:  Orders Placed This Encounter   Medications    predniSONE (DELTASONE) tablet 60 mg    predniSONE (DELTASONE) 10 MG tablet     Sig: Take 4 tablets by mouth once daily for 3 days     Dispense:  12 tablet     Refill:  0       DDX: PE, vertebral fracture, epidural abscess  Lower: cauda equina, herniated disc   Mid: AAA rupture, pyelonephritis, kidney stone, pancreatitis, PUD  Upper: pneumothorax, aortic dissection, PE, nonspecific      Initial MDM/Plan: 50 y.o. female who presents with acute on chronic low back pain. We will treat with oral steroids emergency room with a prescription for 3 days of steroids. Instructed patient to follow-up with her primary care doctor after the steroids are completed and for reevaluation. The patient is understandable and states will follow-up. DIAGNOSTIC RESULTS / EMERGENCY DEPARTMENT COURSE / MDM     LABS:  Labs Reviewed - No data to display      RADIOLOGY:  No results found. EMERGENCY DEPARTMENT COURSE:  See above    PROCEDURES:  None    CONSULTS:  None    CRITICAL CARE:  Please seeattending note    FINAL IMPRESSION      1. Acute exacerbation of chronic low back pain    2.  Bilateral sciatica          DISPOSITION / PLAN     DISPOSITION Decision To Discharge 10/16/2018 04:25:33 AM    Discharge home    PATIENTREFERRED TO:  Delphine Ríos  [id number] [facility]  [phone]    Schedule an appointment as soon as possible for a visit in 3 days        DISCHARGE MEDICATIONS:  Discharge Medication List as of 10/16/2018  4:45 AM      START taking these medications    Details   predniSONE (DELTASONE) 10 MG tablet Take 4 tablets by mouth once daily for 3 days, Disp-12 tablet, R-0Print             Helen Gomez DO  EmergencyMedicine Resident    (Please note that portions of this note were completed with a voice recognition program.  Efforts were made to edit the dictations but occasionally words are mis-transcribed.)     Helen Gomez DO  Resident  10/16/18 [id number]

## 2018-11-19 ENCOUNTER — OFFICE VISIT (OUTPATIENT)
Dept: PRIMARY CARE CLINIC | Age: 48
End: 2018-11-19
Payer: COMMERCIAL

## 2018-11-19 VITALS
OXYGEN SATURATION: 93 % | HEART RATE: 93 BPM | DIASTOLIC BLOOD PRESSURE: 97 MMHG | BODY MASS INDEX: 47.74 KG/M2 | WEIGHT: 293 LBS | SYSTOLIC BLOOD PRESSURE: 154 MMHG

## 2018-11-19 DIAGNOSIS — Z13.220 SCREENING FOR HYPERLIPIDEMIA: ICD-10-CM

## 2018-11-19 DIAGNOSIS — J44.1 COPD EXACERBATION (HCC): ICD-10-CM

## 2018-11-19 DIAGNOSIS — M79.7 FIBROMYALGIA AFFECTING MULTIPLE SITES: ICD-10-CM

## 2018-11-19 DIAGNOSIS — J45.41 MODERATE PERSISTENT ASTHMA WITH ACUTE EXACERBATION: Primary | ICD-10-CM

## 2018-11-19 PROCEDURE — 96160 PT-FOCUSED HLTH RISK ASSMT: CPT | Performed by: NURSE PRACTITIONER

## 2018-11-19 PROCEDURE — 90471 IMMUNIZATION ADMIN: CPT | Performed by: NURSE PRACTITIONER

## 2018-11-19 PROCEDURE — 90688 IIV4 VACCINE SPLT 0.5 ML IM: CPT | Performed by: NURSE PRACTITIONER

## 2018-11-19 PROCEDURE — 99203 OFFICE O/P NEW LOW 30 MIN: CPT | Performed by: NURSE PRACTITIONER

## 2018-11-19 RX ORDER — VENLAFAXINE HYDROCHLORIDE 150 MG/1
CAPSULE, EXTENDED RELEASE ORAL
COMMUNITY
Start: 2018-06-17

## 2018-11-19 RX ORDER — AZITHROMYCIN 250 MG/1
250 TABLET, FILM COATED ORAL DAILY
Qty: 6 TABLET | Refills: 0 | Status: SHIPPED | OUTPATIENT
Start: 2018-11-19 | End: 2018-11-24

## 2018-11-19 RX ORDER — LAMOTRIGINE 25 MG/1
100 TABLET ORAL 2 TIMES DAILY
COMMUNITY
End: 2019-09-13 | Stop reason: ALTCHOICE

## 2018-11-19 RX ORDER — LITHIUM CARBONATE 600 MG/1
CAPSULE ORAL
COMMUNITY
Start: 2018-11-12 | End: 2021-06-29

## 2018-11-19 RX ORDER — ALBUTEROL SULFATE 2.5 MG/3ML
2.5 SOLUTION RESPIRATORY (INHALATION) EVERY 4 HOURS PRN
Qty: 120 EACH | Refills: 3 | Status: SHIPPED | OUTPATIENT
Start: 2018-11-19 | End: 2019-04-30 | Stop reason: SDUPTHER

## 2018-11-19 RX ORDER — ALBUTEROL SULFATE 90 UG/1
1-2 AEROSOL, METERED RESPIRATORY (INHALATION) EVERY 4 HOURS PRN
Qty: 1 INHALER | Refills: 0 | Status: SHIPPED | OUTPATIENT
Start: 2018-11-19 | End: 2019-03-24 | Stop reason: SDUPTHER

## 2018-11-19 ASSESSMENT — ENCOUNTER SYMPTOMS
SHORTNESS OF BREATH: 1
COUGH: 1
RHINORRHEA: 1
SPUTUM PRODUCTION: 1
WHEEZING: 1

## 2018-11-19 ASSESSMENT — PATIENT HEALTH QUESTIONNAIRE - PHQ9
9. THOUGHTS THAT YOU WOULD BE BETTER OFF DEAD, OR OF HURTING YOURSELF: 0
8. MOVING OR SPEAKING SO SLOWLY THAT OTHER PEOPLE COULD HAVE NOTICED. OR THE OPPOSITE, BEING SO FIGETY OR RESTLESS THAT YOU HAVE BEEN MOVING AROUND A LOT MORE THAN USUAL: 0
10. IF YOU CHECKED OFF ANY PROBLEMS, HOW DIFFICULT HAVE THESE PROBLEMS MADE IT FOR YOU TO DO YOUR WORK, TAKE CARE OF THINGS AT HOME, OR GET ALONG WITH OTHER PEOPLE: 1
SUM OF ALL RESPONSES TO PHQ9 QUESTIONS 1 & 2: 3
3. TROUBLE FALLING OR STAYING ASLEEP: 3
6. FEELING BAD ABOUT YOURSELF - OR THAT YOU ARE A FAILURE OR HAVE LET YOURSELF OR YOUR FAMILY DOWN: 0
SUM OF ALL RESPONSES TO PHQ QUESTIONS 1-9: 11
1. LITTLE INTEREST OR PLEASURE IN DOING THINGS: 1
7. TROUBLE CONCENTRATING ON THINGS, SUCH AS READING THE NEWSPAPER OR WATCHING TELEVISION: 2
4. FEELING TIRED OR HAVING LITTLE ENERGY: 3
5. POOR APPETITE OR OVEREATING: 0
SUM OF ALL RESPONSES TO PHQ QUESTIONS 1-9: 11
2. FEELING DOWN, DEPRESSED OR HOPELESS: 2

## 2018-11-29 ENCOUNTER — HOSPITAL ENCOUNTER (OUTPATIENT)
Dept: PAIN MANAGEMENT | Age: 48
Discharge: HOME | End: 2018-11-29
Payer: COMMERCIAL

## 2018-11-29 VITALS
WEIGHT: 293 LBS | DIASTOLIC BLOOD PRESSURE: 94 MMHG | HEIGHT: 67 IN | HEART RATE: 66 BPM | RESPIRATION RATE: 16 BRPM | BODY MASS INDEX: 45.99 KG/M2 | TEMPERATURE: 97.9 F | SYSTOLIC BLOOD PRESSURE: 143 MMHG

## 2018-11-29 DIAGNOSIS — M79.7 FIBROMYALGIA: Primary | ICD-10-CM

## 2018-11-29 PROCEDURE — 99243 OFF/OP CNSLTJ NEW/EST LOW 30: CPT | Performed by: PAIN MEDICINE

## 2018-11-29 PROCEDURE — 99204 OFFICE O/P NEW MOD 45 MIN: CPT

## 2018-11-29 RX ORDER — PREGABALIN 50 MG/1
50 CAPSULE ORAL 2 TIMES DAILY
Qty: 60 CAPSULE | Refills: 1 | Status: SHIPPED | OUTPATIENT
Start: 2018-11-29 | End: 2018-12-04 | Stop reason: ALTCHOICE

## 2018-11-29 ASSESSMENT — ENCOUNTER SYMPTOMS
SORE THROAT: 0
VISUAL CHANGE: 0
SWOLLEN GLANDS: 0
BLURRED VISION: 0
COUGH: 0
CHANGE IN BOWEL HABIT: 0
ABDOMINAL PAIN: 0
BACK PAIN: 1

## 2018-11-29 NOTE — PROGRESS NOTES
well.  Start Lyrica 50mg BID, titrate if tolerates. Would like to start aquatherapy. At this point I think the pain is quite diffuse, focused imaging would be low yield. Consider further imaging in the future. Try compounding cream to the affected area.         Phuc Garza M.D.

## 2018-12-04 RX ORDER — GABAPENTIN 100 MG/1
100 CAPSULE ORAL 3 TIMES DAILY
Qty: 90 CAPSULE | Refills: 0 | Status: SHIPPED | OUTPATIENT
Start: 2018-12-04 | End: 2019-01-08 | Stop reason: SDUPTHER

## 2018-12-26 ENCOUNTER — TELEPHONE (OUTPATIENT)
Dept: PRIMARY CARE CLINIC | Age: 48
End: 2018-12-26

## 2018-12-26 RX ORDER — PREDNISONE 10 MG/1
TABLET ORAL
Qty: 12 TABLET | Refills: 0 | Status: CANCELLED | OUTPATIENT
Start: 2018-12-26

## 2018-12-28 ENCOUNTER — TELEPHONE (OUTPATIENT)
Dept: PRIMARY CARE CLINIC | Age: 48
End: 2018-12-28

## 2018-12-28 DIAGNOSIS — M79.7 FIBROMYALGIA AFFECTING MULTIPLE SITES: Primary | ICD-10-CM

## 2019-01-08 RX ORDER — GABAPENTIN 100 MG/1
100 CAPSULE ORAL 3 TIMES DAILY
Qty: 90 CAPSULE | Refills: 1 | Status: SHIPPED | OUTPATIENT
Start: 2019-01-08 | End: 2019-01-25 | Stop reason: DRUGHIGH

## 2019-01-25 ENCOUNTER — HOSPITAL ENCOUNTER (OUTPATIENT)
Dept: PAIN MANAGEMENT | Age: 49
Discharge: HOME | End: 2019-01-25
Payer: COMMERCIAL

## 2019-01-25 DIAGNOSIS — G89.29 CHRONIC BILATERAL LOW BACK PAIN WITH BILATERAL SCIATICA: Chronic | ICD-10-CM

## 2019-01-25 DIAGNOSIS — M54.42 CHRONIC BILATERAL LOW BACK PAIN WITH BILATERAL SCIATICA: Chronic | ICD-10-CM

## 2019-01-25 DIAGNOSIS — M79.7 FIBROMYALGIA: Chronic | ICD-10-CM

## 2019-01-25 DIAGNOSIS — E66.01 MORBID OBESITY WITH BMI OF 50.0-59.9, ADULT (HCC): ICD-10-CM

## 2019-01-25 DIAGNOSIS — M54.41 CHRONIC BILATERAL LOW BACK PAIN WITH BILATERAL SCIATICA: Chronic | ICD-10-CM
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
## 2019-01-25 PROCEDURE — 99214 OFFICE O/P EST MOD 30 MIN: CPT | Performed by: NURSE PRACTITIONER

## 2019-01-25 PROCEDURE — 99214 OFFICE O/P EST MOD 30 MIN: CPT

## 2019-01-25 RX ORDER — GABAPENTIN 300 MG/1
300 CAPSULE ORAL 3 TIMES DAILY
Qty: 90 CAPSULE | Refills: 0 | Status: SHIPPED | OUTPATIENT
Start: 2019-01-25 | End: 2019-05-03

## 2019-01-25 ASSESSMENT — ENCOUNTER SYMPTOMS
SHORTNESS OF BREATH: 0
COUGH: 0
BACK PAIN: 1
CONSTIPATION: 0

## 2019-01-31 ENCOUNTER — TELEPHONE (OUTPATIENT)
Dept: PRIMARY CARE CLINIC | Age: 49
End: 2019-01-31

## 2019-02-20 ENCOUNTER — HOSPITAL ENCOUNTER (OUTPATIENT)
Dept: CT IMAGING | Age: 49
Discharge: HOME | End: 2019-02-22
Payer: COMMERCIAL

## 2019-02-20 DIAGNOSIS — G89.29 CHRONIC BILATERAL LOW BACK PAIN WITH BILATERAL SCIATICA: Chronic | ICD-10-CM

## 2019-02-20 DIAGNOSIS — M54.42 CHRONIC BILATERAL LOW BACK PAIN WITH BILATERAL SCIATICA: Chronic | ICD-10-CM

## 2019-02-20 DIAGNOSIS — M54.41 CHRONIC BILATERAL LOW BACK PAIN WITH BILATERAL SCIATICA: Chronic | ICD-10-CM

## 2019-02-20 PROCEDURE — 72131 CT LUMBAR SPINE W/O DYE: CPT

## 2019-02-21 ENCOUNTER — HOSPITAL ENCOUNTER (OUTPATIENT)
Dept: PAIN MANAGEMENT | Age: 49
Discharge: HOME | End: 2019-02-21
Payer: COMMERCIAL

## 2019-02-21 VITALS
RESPIRATION RATE: 20 BRPM | TEMPERATURE: 97.7 F | SYSTOLIC BLOOD PRESSURE: 145 MMHG | HEART RATE: 119 BPM | DIASTOLIC BLOOD PRESSURE: 73 MMHG

## 2019-02-21 DIAGNOSIS — M25.561 CHRONIC PAIN OF BOTH KNEES: Primary | ICD-10-CM

## 2019-02-21 DIAGNOSIS — M54.41 CHRONIC BILATERAL LOW BACK PAIN WITH BILATERAL SCIATICA: Chronic | ICD-10-CM

## 2019-02-21 DIAGNOSIS — M54.42 CHRONIC BILATERAL LOW BACK PAIN WITH BILATERAL SCIATICA: Chronic | ICD-10-CM

## 2019-02-21 DIAGNOSIS — M25.552 PAIN OF BOTH HIP JOINTS: ICD-10-CM

## 2019-02-21 DIAGNOSIS — M25.551 PAIN OF BOTH HIP JOINTS: ICD-10-CM

## 2019-02-21 DIAGNOSIS — G89.29 CHRONIC PAIN OF BOTH KNEES: Primary | ICD-10-CM

## 2019-02-21 DIAGNOSIS — G89.29 CHRONIC BILATERAL LOW BACK PAIN WITH BILATERAL SCIATICA: Chronic | ICD-10-CM

## 2019-02-21 DIAGNOSIS — M25.562 CHRONIC PAIN OF BOTH KNEES: Primary | ICD-10-CM

## 2019-02-21 PROCEDURE — 99214 OFFICE O/P EST MOD 30 MIN: CPT | Performed by: PAIN MEDICINE

## 2019-02-21 PROCEDURE — 99214 OFFICE O/P EST MOD 30 MIN: CPT

## 2019-02-21 RX ORDER — GABAPENTIN 300 MG/1
300 CAPSULE ORAL 2 TIMES DAILY
Qty: 70 CAPSULE | Refills: 0 | Status: SHIPPED | OUTPATIENT
Start: 2019-02-21 | End: 2019-05-03

## 2019-02-21 RX ORDER — GABAPENTIN 600 MG/1
600 TABLET ORAL 3 TIMES DAILY
Qty: 90 TABLET | Refills: 0 | Status: SHIPPED | OUTPATIENT
Start: 2019-02-21 | End: 2019-02-21 | Stop reason: SDUPTHER

## 2019-02-21 RX ORDER — GABAPENTIN 600 MG/1
600 TABLET ORAL 3 TIMES DAILY
Qty: 90 TABLET | Refills: 0 | Status: SHIPPED | OUTPATIENT
Start: 2019-03-06 | End: 2019-03-21 | Stop reason: SDUPTHER

## 2019-02-21 ASSESSMENT — PAIN DESCRIPTION - ORIENTATION: ORIENTATION: LOWER

## 2019-02-21 ASSESSMENT — PAIN DESCRIPTION - PAIN TYPE: TYPE: CHRONIC PAIN

## 2019-02-21 ASSESSMENT — PAIN DESCRIPTION - FREQUENCY: FREQUENCY: CONTINUOUS

## 2019-02-21 ASSESSMENT — PAIN SCALES - GENERAL: PAINLEVEL_OUTOF10: 7

## 2019-02-21 ASSESSMENT — ENCOUNTER SYMPTOMS
BACK PAIN: 1
ABDOMINAL PAIN: 0

## 2019-02-21 ASSESSMENT — PAIN - FUNCTIONAL ASSESSMENT: PAIN_FUNCTIONAL_ASSESSMENT: PREVENTS OR INTERFERES SOME ACTIVE ACTIVITIES AND ADLS

## 2019-02-21 ASSESSMENT — PAIN DESCRIPTION - PROGRESSION: CLINICAL_PROGRESSION: NOT CHANGED

## 2019-02-21 ASSESSMENT — PAIN DESCRIPTION - ONSET: ONSET: ON-GOING

## 2019-02-21 ASSESSMENT — PAIN DESCRIPTION - LOCATION: LOCATION: BACK
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
## 2019-03-01 ENCOUNTER — HOSPITAL ENCOUNTER (OUTPATIENT)
Dept: INTERVENTIONAL RADIOLOGY/VASCULAR | Age: 49
Discharge: HOME | End: 2019-03-03
Payer: COMMERCIAL

## 2019-03-01 ENCOUNTER — HOSPITAL ENCOUNTER (OUTPATIENT)
Dept: CT IMAGING | Age: 49
Discharge: HOME | End: 2019-03-03
Payer: COMMERCIAL

## 2019-03-01 VITALS
WEIGHT: 293 LBS | BODY MASS INDEX: 45.99 KG/M2 | RESPIRATION RATE: 18 BRPM | OXYGEN SATURATION: 97 % | TEMPERATURE: 98.4 F | SYSTOLIC BLOOD PRESSURE: 156 MMHG | DIASTOLIC BLOOD PRESSURE: 100 MMHG | HEIGHT: 67 IN | HEART RATE: 88 BPM

## 2019-03-01 DIAGNOSIS — M54.42 CHRONIC BILATERAL LOW BACK PAIN WITH BILATERAL SCIATICA: Chronic | ICD-10-CM

## 2019-03-01 DIAGNOSIS — G89.29 CHRONIC BILATERAL LOW BACK PAIN WITH BILATERAL SCIATICA: Chronic | ICD-10-CM

## 2019-03-01 DIAGNOSIS — M54.41 CHRONIC BILATERAL LOW BACK PAIN WITH BILATERAL SCIATICA: Chronic | ICD-10-CM

## 2019-03-01 PROCEDURE — 62304 MYELOGRAPHY LUMBAR INJECTION: CPT | Performed by: RADIOLOGY

## 2019-03-01 PROCEDURE — 72132 CT LUMBAR SPINE W/DYE: CPT

## 2019-03-01 PROCEDURE — 7100000010 HC PHASE II RECOVERY - FIRST 15 MIN

## 2019-03-01 PROCEDURE — 7100000011 HC PHASE II RECOVERY - ADDTL 15 MIN

## 2019-03-01 PROCEDURE — 6360000004 HC RX CONTRAST MEDICATION: Performed by: PAIN MEDICINE

## 2019-03-01 PROCEDURE — 2709999900 HC NON-CHARGEABLE SUPPLY

## 2019-03-01 RX ADMIN — IOPAMIDOL 15 ML: 408 INJECTION, SOLUTION INTRATHECAL at 11:40

## 2019-03-01 ASSESSMENT — PAIN DESCRIPTION - ORIENTATION: ORIENTATION: LOWER

## 2019-03-01 ASSESSMENT — PAIN DESCRIPTION - PROGRESSION: CLINICAL_PROGRESSION: NOT CHANGED

## 2019-03-01 ASSESSMENT — PAIN DESCRIPTION - LOCATION: LOCATION: BACK;KNEE

## 2019-03-01 ASSESSMENT — PAIN SCALES - GENERAL
PAINLEVEL_OUTOF10: 8
PAINLEVEL_OUTOF10: 8

## 2019-03-01 ASSESSMENT — PAIN DESCRIPTION - FREQUENCY: FREQUENCY: CONTINUOUS

## 2019-03-01 ASSESSMENT — PAIN DESCRIPTION - PAIN TYPE: TYPE: CHRONIC PAIN

## 2019-03-01 ASSESSMENT — PAIN DESCRIPTION - DESCRIPTORS: DESCRIPTORS: CONSTANT;SHOOTING

## 2019-03-01 ASSESSMENT — PAIN DESCRIPTION - ONSET: ONSET: ON-GOING

## 2019-03-01 ASSESSMENT — PAIN - FUNCTIONAL ASSESSMENT: PAIN_FUNCTIONAL_ASSESSMENT: 0-10

## 2019-03-14 ENCOUNTER — HOSPITAL ENCOUNTER (OUTPATIENT)
Dept: GENERAL RADIOLOGY | Age: 49
Discharge: HOME | End: 2019-03-16
Payer: COMMERCIAL

## 2019-03-14 ENCOUNTER — HOSPITAL ENCOUNTER (OUTPATIENT)
Age: 49
Discharge: HOME | End: 2019-03-16
Payer: COMMERCIAL

## 2019-03-14 DIAGNOSIS — M25.552 PAIN OF BOTH HIP JOINTS: ICD-10-CM

## 2019-03-14 DIAGNOSIS — M25.551 PAIN OF BOTH HIP JOINTS: ICD-10-CM

## 2019-03-14 DIAGNOSIS — M25.562 CHRONIC PAIN OF BOTH KNEES: ICD-10-CM

## 2019-03-14 DIAGNOSIS — M25.561 CHRONIC PAIN OF BOTH KNEES: ICD-10-CM

## 2019-03-14 DIAGNOSIS — G89.29 CHRONIC PAIN OF BOTH KNEES: ICD-10-CM

## 2019-03-14 PROCEDURE — 73562 X-RAY EXAM OF KNEE 3: CPT

## 2019-03-14 PROCEDURE — 73502 X-RAY EXAM HIP UNI 2-3 VIEWS: CPT

## 2019-03-21 ENCOUNTER — HOSPITAL ENCOUNTER (OUTPATIENT)
Dept: PAIN MANAGEMENT | Age: 49
Discharge: HOME | End: 2019-03-21
Payer: COMMERCIAL

## 2019-03-21 VITALS
RESPIRATION RATE: 14 BRPM | TEMPERATURE: 98.6 F | HEART RATE: 74 BPM | DIASTOLIC BLOOD PRESSURE: 68 MMHG | SYSTOLIC BLOOD PRESSURE: 137 MMHG

## 2019-03-21 DIAGNOSIS — M54.41 CHRONIC BILATERAL LOW BACK PAIN WITH BILATERAL SCIATICA: Primary | Chronic | ICD-10-CM

## 2019-03-21 DIAGNOSIS — M54.42 CHRONIC BILATERAL LOW BACK PAIN WITH BILATERAL SCIATICA: Primary | Chronic | ICD-10-CM

## 2019-03-21 DIAGNOSIS — G89.29 CHRONIC BILATERAL LOW BACK PAIN WITH BILATERAL SCIATICA: Primary | Chronic | ICD-10-CM

## 2019-03-21 PROCEDURE — 99214 OFFICE O/P EST MOD 30 MIN: CPT

## 2019-03-21 PROCEDURE — 99214 OFFICE O/P EST MOD 30 MIN: CPT | Performed by: PAIN MEDICINE

## 2019-03-21 RX ORDER — MELOXICAM 7.5 MG/1
7.5 TABLET ORAL DAILY
Qty: 30 TABLET | Refills: 0 | Status: SHIPPED | OUTPATIENT
Start: 2019-03-21 | End: 2019-05-07 | Stop reason: SDUPTHER

## 2019-03-21 RX ORDER — BACLOFEN 10 MG/1
10 TABLET ORAL 3 TIMES DAILY
Qty: 90 TABLET | Refills: 0 | Status: SHIPPED | OUTPATIENT
Start: 2019-03-21 | End: 2019-05-07 | Stop reason: SDUPTHER

## 2019-03-21 RX ORDER — GABAPENTIN 600 MG/1
600 TABLET ORAL 3 TIMES DAILY
Qty: 90 TABLET | Refills: 0 | Status: SHIPPED | OUTPATIENT
Start: 2019-04-05 | End: 2019-05-07 | Stop reason: SDUPTHER

## 2019-03-21 ASSESSMENT — ENCOUNTER SYMPTOMS
BOWEL INCONTINENCE: 1
BACK PAIN: 1

## 2019-03-25 RX ORDER — ALBUTEROL SULFATE 90 UG/1
1-2 AEROSOL, METERED RESPIRATORY (INHALATION) EVERY 4 HOURS PRN
Qty: 1 INHALER | Refills: 0 | Status: SHIPPED | OUTPATIENT
Start: 2019-03-25 | End: 2019-04-30 | Stop reason: SDUPTHER

## 2019-04-03 ENCOUNTER — OFFICE VISIT (OUTPATIENT)
Dept: ORTHOPEDIC SURGERY | Age: 49
End: 2019-04-03
Payer: COMMERCIAL

## 2019-04-03 VITALS — HEIGHT: 67 IN | WEIGHT: 293 LBS | BODY MASS INDEX: 45.99 KG/M2

## 2019-04-03 DIAGNOSIS — M17.0 PRIMARY OSTEOARTHRITIS OF BOTH KNEES: ICD-10-CM

## 2019-04-03 DIAGNOSIS — M16.0 PRIMARY OSTEOARTHRITIS OF BOTH HIPS: Primary | ICD-10-CM

## 2019-04-03 DIAGNOSIS — M54.42 CHRONIC BILATERAL LOW BACK PAIN WITH BILATERAL SCIATICA: ICD-10-CM

## 2019-04-03 DIAGNOSIS — E66.01 OBESITY, MORBID, BMI 50 OR HIGHER (HCC): ICD-10-CM

## 2019-04-03 DIAGNOSIS — G89.29 CHRONIC BILATERAL LOW BACK PAIN WITH BILATERAL SCIATICA: ICD-10-CM

## 2019-04-03 DIAGNOSIS — M54.41 CHRONIC BILATERAL LOW BACK PAIN WITH BILATERAL SCIATICA: ICD-10-CM

## 2019-04-03 DIAGNOSIS — M79.7 FIBROMYALGIA: ICD-10-CM

## 2019-04-03 PROCEDURE — G8417 CALC BMI ABV UP PARAM F/U: HCPCS | Performed by: ORTHOPAEDIC SURGERY

## 2019-04-03 PROCEDURE — 99204 OFFICE O/P NEW MOD 45 MIN: CPT | Performed by: ORTHOPAEDIC SURGERY

## 2019-04-03 PROCEDURE — 1036F TOBACCO NON-USER: CPT | Performed by: ORTHOPAEDIC SURGERY

## 2019-04-03 PROCEDURE — G8427 DOCREV CUR MEDS BY ELIG CLIN: HCPCS | Performed by: ORTHOPAEDIC SURGERY

## 2019-04-03 ASSESSMENT — ENCOUNTER SYMPTOMS
CHOKING: 0
BACK PAIN: 1
DIARRHEA: 0
ABDOMINAL PAIN: 0
WHEEZING: 0
CHEST TIGHTNESS: 0
VOMITING: 0
NAUSEA: 0
EYE DISCHARGE: 0

## 2019-04-03 NOTE — PROGRESS NOTES
[address] [address] [place] [address]  Dept: [phone]    AmbulatoryOrthopedic New Patient Visit      CHIEF COMPLAINT:    Chief Complaint   Patient presents with    Hip Pain     bilateral        HISTORY OF PRESENT ILLNESS:      The patient is a 50 y.o. female who is being seen for consultation and evaluation of bilateral hip and bilateral knee pain . Patient presents today with greater than 5 year history of progressively worsening bilateral hip and knee pain. Patient denies any history of trauma to the hips or knees. Patient also has a significant history of lumbar back pain for which she's having surgery next Friday for what she describes as a microdiscectomy procedure. Patient states that the pain is localized to bilateral groins radiating down the legs and a global pain about the knees. Patient states that she has pain with any activity. Denies relief with any previous procedures such as injections/activity modifications, physical therapy or anti-inflammatories. She has ambulated with assistive devices that do not seem to provide relief other than providing some stability. Denies any numbness or tailoring to the extremity. Does have a significant past medical history as noted below.       Past Medical History:    Past Medical History:   Diagnosis Date    Acute exacerbation of COPD with asthma ([facility] [address] 6/7/16    Anxiety     Asthma     Benign essential HTN     Bipolar 1 disorder ([facility] [address]     COPD (chronic obstructive pulmonary disease) (HCC)     Depression     Fibromyalgia     Headache(784.0)     Pneumonia due to infectious organism 6/7/2016    Post traumatic stress disorder        Past SurgicalHistory:    Past Surgical History:   Procedure Laterality Date    CHOLECYSTECTOMY      TONSILLECTOMY         Current Medications:   Current Outpatient Medications   Medication Sig Dispense Refill    albuterol sulfate HFA (PROVENTIL HFA) 108 (90 Base) MCG/ACT inhaler Inhale 1-2 puffs into the lungs every 4 hours as needed for Wheezing or Shortness of Breath (Space out to every 6 hours as symptoms improve) Space out to every 6 hours as symptoms improve. 1 Inhaler 0    meloxicam (MOBIC) 7.5 MG tablet Take 1 tablet by mouth daily 30 tablet 0    baclofen (LIORESAL) 10 MG tablet Take 1 tablet by mouth 3 times daily 90 tablet 0    [START ON 4/5/2019] gabapentin (NEURONTIN) 600 MG tablet Take 1 tablet by mouth 3 times daily for 30 days. 90 tablet 0    venlafaxine (EFFEXOR XR) 150 MG extended release capsule       lithium 600 MG capsule       lamoTRIgine (LAMICTAL) 25 MG tablet Take 25 mg by mouth 2 times daily      albuterol (PROVENTIL) (2.5 MG/3ML) 0.083% nebulizer solution Take 3 mLs by nebulization every 4 hours as needed for Wheezing 120 each 3    baclofen (LIORESAL) 10 MG tablet Take 10 mg by mouth 3 times daily      lithium 300 MG tablet Take 300 mg by mouth 2 times daily 450 mg am 900 mg pm       ALPRAZolam (XANAX) 0.5 MG tablet Take 0.5 mg by mouth nightly as needed for Sleep.  gabapentin (NEURONTIN) 300 MG capsule Take 1 capsule by mouth 2 times daily for 30 days. Take 300mg in am and noon and 600mg at bedtime x1 week, then week 2 300mg am 600mg at noon and bedtime. 70 capsule 0    gabapentin (NEURONTIN) 300 MG capsule Take 1 capsule by mouth 3 times daily for 30 days. Intended supply: 30 days. 90 capsule 0     No current facility-administered medications for this visit. Allergies:    Food; Penicillins; Parafon forte dsc [chlorzoxazone]; and Biaxin [clarithromycin]    Social History:   Social History     Socioeconomic History    Marital status:       Spouse name: Not on file    Number of children: Not on file    Years of education: Not on file    Highest education level: Not on file   Occupational History    Not on file   Social Needs    Financial resource strain: Not on file    Food insecurity:     Worry: Not on file Inability: Not on file    Transportation needs:     Medical: Not on file     Non-medical: Not on file   Tobacco Use    Smoking status: Former Smoker     Packs/day: 2.00     Years: 20.00     Pack years: 40.00     Types: Cigarettes     Last attempt to quit: 1/10/2010     Years since quittin.2    Smokeless tobacco: Never Used   Substance and Sexual Activity    Alcohol use: No    Drug use: No    Sexual activity: Not on file   Lifestyle    Physical activity:     Days per week: Not on file     Minutes per session: Not on file    Stress: Not on file   Relationships    Social connections:     Talks on phone: Not on file     Gets together: Not on file     Attends Buddhism service: Not on file     Active member of club or organization: Not on file     Attends meetings of clubs or organizations: Not on file     Relationship status: Not on file    Intimate partner violence:     Fear of current or ex partner: Not on file     Emotionally abused: Not on file     Physically abused: Not on file     Forced sexual activity: Not on file   Other Topics Concern    Not on file   Social History Narrative    Not on file       Family History:  Family History   Problem Relation Age of Onset    Hypertension Mother     Heart Attack Mother     Heart Disease Mother     Hypertension Father     Heart Disease Father     Diabetes Other         cousin    Heart Disease Brother          REVIEW OF SYSTEMS:  Review of Systems   Constitutional: Negative for chills and fever. HENT: Negative for drooling, hearing loss and nosebleeds. Eyes: Negative for discharge and visual disturbance. Respiratory: Negative for choking, chest tightness and wheezing. Cardiovascular: Negative for chest pain and leg swelling. Gastrointestinal: Negative for abdominal pain, diarrhea, nausea and vomiting. Genitourinary: Negative for difficulty urinating and dysuria.    Musculoskeletal: Positive for arthralgias, back pain, gait problem and myalgias. Skin: Negative for rash and wound. Neurological: Negative for dizziness and light-headedness. I have reviewed the CC, HPI, ROS, PMH, FHX, Social History. Brain Fisherr with the documentation provided by other staff, residents, and/or medical students and have reviewed their documentation prior to providing my signature indicating agreement. PHYSICAL EXAM:  Ht 5' 7.01\" (1.702 m)   Wt (!) 326 lb 1 oz (147.9 kg)   BMI 51.06 kg/m²   Physical Exam  Gen: alert and oriented  Psych:  Appropriate affect; Appropriate knowledge base; Appropriate mood; No hallucinations; Head: normocephalic atraumatic  Chest: symmetric chest excursion  Pelvis: stable  Ortho Exam  Extremity:  B LE: Positive hip impingement as well as Stinchfield bilaterally. Patient has decreased hip range of motion noted. Tenderness palpation along the medial lateral joint lines bilaterally. No significant effusion appreciated today. Knee range of motion 0-110° bilaterally. compartments soft. 2+ DP/PT pulses with BCR. TA/EHL/FHL/GS motor 5/5. L2-S1 SILT      Radiology:    No new x-rays obtained today. Imaging of bilateral hips and bilateral knees demonstrates severe endstage bilateral hip osteoarthritis with underlying dysplasia. Shallow acetabulum is appreciated bilaterally. Bilateral knee films demonstrate moderate tricompartmental osteoarthritis. ASSESSMENT:     1. Primary osteoarthritis of both hips         PLAN:     Due to patient's clinical presentation of severe bilateral hip posterior arthritis and moderate knee os her arthritis patient also failing conservative measures has elected to proceed forward with surgical intervention. Risks and benefits were discussed in detail. With patient's BMI over 40 did discuss that she is at an increased risk of complications but would like to continue forward with surgery.   Due to the fact patient is having spine surgery next week we will see the patient back 2 weeks after her spine surgery to assess her ability to proceed forward with right hip arthroplasty. At this time patient can continue with activity modifications we do recommend she use assistive ambulatory devices to provide for greater stability so she will not fall. She can also take anti-inflammatories as previously prescribed. We did discuss the benefits of weight loss but patient feels that due to her severe arthritis in makes it difficult for her to mobilize. Offered a referral to weight management which she denied. Return in about 1 month (around 5/3/2019). No orders of the defined types were placed in this encounter. No orders of the defined types were placed in this encounter.        Alisha Ibarra DO  PGY-5, Department of [address]  1:22 [place] 4/3/2019

## 2019-04-11 LAB
ALBUMIN SERPL-MCNC: 4.1 G/DL
ALP BLD-CCNC: 71 U/L
ALT SERPL-CCNC: 15 U/L
ANION GAP SERPL CALCULATED.3IONS-SCNC: 8 MMOL/L
AST SERPL-CCNC: 22 U/L
BASOPHILS ABSOLUTE: 0.4 /ΜL
BASOPHILS RELATIVE PERCENT: 4 %
BILIRUB SERPL-MCNC: 0.5 MG/DL (ref 0.1–1.4)
BUN BLDV-MCNC: 8 MG/DL
CALCIUM SERPL-MCNC: 9.2 MG/DL
CHLORIDE BLD-SCNC: 105 MMOL/L
CHOLESTEROL, TOTAL: 172 MG/DL
CHOLESTEROL/HDL RATIO: 3.3
CO2: 26 MMOL/L
CREAT SERPL-MCNC: 0.8 MG/DL
EOSINOPHILS ABSOLUTE: 2.9 /ΜL
EOSINOPHILS RELATIVE PERCENT: 30 %
GFR CALCULATED: NORMAL
GLUCOSE BLD-MCNC: 97 MG/DL
HCT VFR BLD CALC: 43.2 % (ref 36–46)
HDLC SERPL-MCNC: 52 MG/DL (ref 35–70)
HEMOGLOBIN: 14.8 G/DL (ref 12–16)
LDL CHOLESTEROL CALCULATED: 61 MG/DL (ref 0–160)
LYMPHOCYTES ABSOLUTE: 2.3 /ΜL
LYMPHOCYTES RELATIVE PERCENT: 24 %
MCH RBC QN AUTO: 32.8 PG
MCHC RBC AUTO-ENTMCNC: 34.2 G/DL
MCV RBC AUTO: 96 FL
MONOCYTES ABSOLUTE: 0.5 /ΜL
MONOCYTES RELATIVE PERCENT: 5 %
NEUTROPHILS ABSOLUTE: 3.6 /ΜL
NEUTROPHILS RELATIVE PERCENT: 33 %
PLATELET # BLD: 258 K/ΜL
PMV BLD AUTO: 9.5 FL
POTASSIUM SERPL-SCNC: 4.2 MMOL/L
RBC # BLD: 4.51 10^6/ΜL
SODIUM BLD-SCNC: 139 MMOL/L
TOTAL PROTEIN: 7.1
TRIGL SERPL-MCNC: 294 MG/DL
TSH SERPL DL<=0.05 MIU/L-ACNC: 3.54 UIU/ML
VLDLC SERPL CALC-MCNC: 59 MG/DL
WBC # BLD: 9.7 10^3/ML

## 2019-04-30 NOTE — TELEPHONE ENCOUNTER
Health Maintenance   Topic Date Due    HIV screen  09/25/1985    DTaP/Tdap/Td vaccine (1 - Tdap) 09/25/1989    Cervical cancer screen  09/25/1991    Lipid screen  05/02/2018    Potassium monitoring  08/06/2019    Creatinine monitoring  08/06/2019    Flu vaccine  Completed    Pneumococcal 0-64 years Vaccine  Completed             (applicable per patient's age: Cancer Screenings, Depression Screening, Fall Risk Screening, Immunizations)    Hemoglobin A1C (%)   Date Value   08/06/2018 5.2   10/24/2014 5.4   05/03/2013 5.7     LDL Cholesterol (mg/dL)   Date Value   05/02/2013 111 (H)     AST (U/L)   Date Value   07/09/2016 25     ALT (U/L)   Date Value   07/09/2016 23     BUN (mg/dL)   Date Value   08/06/2018 5 (L)      (goal A1C is < 7)   (goal LDL is <100) need 30-50% reduction from baseline     BP Readings from Last 3 Encounters:   03/21/19 137/68   03/01/19 (!) 156/100   02/21/19 (!) 145/73    (goal /80)      All Future Testing planned in [facility]:  Lab Frequency Next Occurrence   Lipid, Fasting Once 05/20/2019   PT aquatic therapy Once 11/29/2018       Next Visit Date:  Future Appointments   Date Time Provider Natalie Goldman   5/8/2019  1:30 PM SCHEDULE, [facility] OR[facility]   5/10/2019  8:45 AM LAWRENCE Vargas - CNP [facility]            Patient Active Problem List:     COPD (chronic obstructive pulmonary disease) ([facility] [address]     Acute exacerbation of chronic obstructive pulmonary disease (COPD) ([facility] [address]     Pneumonia due to infectious organism     Multiple pulmonary nodules     Morbid obesity with BMI of 50.0-59.9, adult ([facility] [address]     Leukocytosis     Pneumonia of right lower lobe due to infectious organism ([address]     Asthma     Bipolar 1 disorder ([facility] [address]     Anxiety     COPD exacerbation ([facility] [address]     Benign essential HTN     Depression with anxiety     Abnormal TSH     Chronic bilateral low back pain with bilateral sciatica     Fibromyalgia

## 2019-05-01 RX ORDER — ALBUTEROL SULFATE 2.5 MG/3ML
2.5 SOLUTION RESPIRATORY (INHALATION) EVERY 4 HOURS PRN
Qty: 120 EACH | Refills: 3 | Status: SHIPPED | OUTPATIENT
Start: 2019-05-01 | End: 2021-12-25 | Stop reason: SDUPTHER

## 2019-05-01 RX ORDER — ALBUTEROL SULFATE 90 UG/1
1-2 AEROSOL, METERED RESPIRATORY (INHALATION) EVERY 4 HOURS PRN
Qty: 1 INHALER | Refills: 0 | Status: SHIPPED | OUTPATIENT
Start: 2019-05-01 | End: 2019-05-03 | Stop reason: SDUPTHER

## 2019-05-03 ENCOUNTER — OFFICE VISIT (OUTPATIENT)
Dept: PRIMARY CARE CLINIC | Age: 49
End: 2019-05-03
Payer: COMMERCIAL

## 2019-05-03 VITALS
TEMPERATURE: 97 F | DIASTOLIC BLOOD PRESSURE: 87 MMHG | OXYGEN SATURATION: 95 % | SYSTOLIC BLOOD PRESSURE: 129 MMHG | HEART RATE: 90 BPM | WEIGHT: 293 LBS | BODY MASS INDEX: 48.38 KG/M2

## 2019-05-03 DIAGNOSIS — N39.41 URGE INCONTINENCE OF URINE: ICD-10-CM

## 2019-05-03 DIAGNOSIS — J44.9 CHRONIC OBSTRUCTIVE PULMONARY DISEASE, UNSPECIFIED COPD TYPE (HCC): ICD-10-CM

## 2019-05-03 DIAGNOSIS — J45.41 MODERATE PERSISTENT ASTHMA WITH ACUTE EXACERBATION: Primary | ICD-10-CM

## 2019-05-03 DIAGNOSIS — Z13.31 POSITIVE DEPRESSION SCREENING: ICD-10-CM

## 2019-05-03 PROCEDURE — 3023F SPIROM DOC REV: CPT | Performed by: NURSE PRACTITIONER

## 2019-05-03 PROCEDURE — 99214 OFFICE O/P EST MOD 30 MIN: CPT | Performed by: NURSE PRACTITIONER

## 2019-05-03 PROCEDURE — G8431 POS CLIN DEPRES SCRN F/U DOC: HCPCS | Performed by: NURSE PRACTITIONER

## 2019-05-03 PROCEDURE — G8427 DOCREV CUR MEDS BY ELIG CLIN: HCPCS | Performed by: NURSE PRACTITIONER

## 2019-05-03 PROCEDURE — G8417 CALC BMI ABV UP PARAM F/U: HCPCS | Performed by: NURSE PRACTITIONER

## 2019-05-03 PROCEDURE — G8926 SPIRO NO PERF OR DOC: HCPCS | Performed by: NURSE PRACTITIONER

## 2019-05-03 PROCEDURE — 1036F TOBACCO NON-USER: CPT | Performed by: NURSE PRACTITIONER

## 2019-05-03 RX ORDER — HYDROCODONE BITARTRATE AND ACETAMINOPHEN 5; 325 MG/1; MG/1
TABLET ORAL
Refills: 0 | COMMUNITY
Start: 2019-04-19 | End: 2019-05-31 | Stop reason: ALTCHOICE

## 2019-05-03 RX ORDER — NEBULIZER ACCESSORIES
1 KIT MISCELLANEOUS DAILY PRN
Qty: 1 KIT | Refills: 1 | Status: SHIPPED | OUTPATIENT
Start: 2019-05-03 | End: 2019-12-17

## 2019-05-03 RX ORDER — ALBUTEROL SULFATE 90 UG/1
1-2 AEROSOL, METERED RESPIRATORY (INHALATION) EVERY 4 HOURS PRN
Qty: 1 INHALER | Refills: 5 | Status: SHIPPED | OUTPATIENT
Start: 2019-05-03 | End: 2020-01-27

## 2019-05-03 ASSESSMENT — ENCOUNTER SYMPTOMS
CHEST TIGHTNESS: 0
APNEA: 0
SHORTNESS OF BREATH: 1
RHINORRHEA: 1
WHEEZING: 1
COUGH: 1

## 2019-05-03 NOTE — PROGRESS NOTES
[facility]  [address] [place] [address]  Dept: [phone]  Dept Fax: [phone]    5/3/2019     Rosa Vickers (:  1970)is a 50 y.o. female, here for evaluation of the following medical concerns:   Chief Complaint   Patient presents with    6 Month Follow-Up    Equipment Request       Asking today for supplies for her nebulizer, tubing and mask  She also needs incontinence supplies  She uses [employer]    She does not need her nebulizer too often, not daily  However many environmental triggers exist and se uses her inhaler very frequently  Does not want a steroid inhaler as they have made her gums bleed and deteriorate    Ever since start of back pain, she;s been struggling with urge incontinence  Last 9 months it's really kicked in  She cannot always make it due to her pain  Setting a timer to go every 1-1.5 hours, is always urinating. Empties bladder fully  Some loss of sensation    . Review of Systems   Constitutional: Negative for fever. HENT: Positive for postnasal drip, rhinorrhea and sneezing. Respiratory: Positive for cough, shortness of breath and wheezing. Negative for apnea and chest tightness. Cardiovascular: Negative for chest pain. Allergic/Immunologic: Positive for environmental allergies. Prior to Visit Medications    Medication Sig Taking?  Authorizing Provider   HYDROcodone-acetaminophen (NORCO) 5-325 MG per tablet take 1 tablet by mouth every 6 hours if needed for pain MAX DAILY AMOUNT: 4 TABLETS Yes Historical Provider, MD   Respiratory Therapy Supplies (NEBULIZER/TUBING/MOUTHPIECE) KIT 1 kit by Does not apply route daily as needed (as needed for wheezing) Yes Dot Divers, APRN - CNP   Incontinence Supplies MISC 1 Units by Does not apply route 5 times daily Yes Dot Divers, APRN - CNP   albuterol sulfate HFA (PROVENTIL HFA) 108 (90 Base) MCG/ACT inhaler Inhale 1-2 puffs into the lungs every 4 hours as needed for Wheezing or Shortness of Breath (Space out to every 6 hours as symptoms improve) Yes LAWRENCE Cooper CNP   albuterol (PROVENTIL) (2.5 MG/3ML) 0.083% nebulizer solution Take 3 mLs by nebulization every 4 hours as needed for Wheezing Yes LAWRENCE Cooper CNP   meloxicam (MOBIC) 7.5 MG tablet Take 1 tablet by mouth daily Yes Kyle Wick MD   baclofen (LIORESAL) 10 MG tablet Take 1 tablet by mouth 3 times daily Yes Kyle Wick MD   gabapentin (NEURONTIN) 600 MG tablet Take 1 tablet by mouth 3 times daily for 30 days. Yes Kyle Wick MD   venlafaxine (EFFEXOR XR) 150 MG extended release capsule  Yes Historical Provider, MD   lithium 600 MG capsule  Yes Historical Provider, MD   lamoTRIgine (LAMICTAL) 25 MG tablet Take 100 mg by mouth 2 times daily  Yes Historical Provider, MD   baclofen (LIORESAL) 10 MG tablet Take 10 mg by mouth 3 times daily Yes Historical Provider, MD   lithium 300 MG tablet Take 300 mg by mouth 2 times daily 450 mg am 900 mg pm  Yes Historical Provider, MD   ALPRAZolam (XANAX) 0.5 MG tablet Take 0.5 mg by mouth nightly as needed for Sleep. Yes Historical Provider, MD        Social History     Tobacco Use    Smoking status: Former Smoker     Packs/day: 2.00     Years: 20.00     Pack years: 40.00     Types: Cigarettes     Last attempt to quit: 1/10/2010     Years since quittin.3    Smokeless tobacco: Never Used   Substance Use Topics    Alcohol use: No        Vitals:    19 0846 19 0938   BP: (!) 150/94 129/87   Site: Left Lower Arm Right Lower Arm   Position: Sitting Sitting   Cuff Size: Medium Adult Medium Adult   Pulse: 90    Temp: 97 °F (36.1 °C)    TempSrc: Oral    SpO2: 95%    Weight: (!) 309 lb (140.2 kg)      Estimated body mass index is 48.38 kg/m² as calculated from the following:    Height as of 4/3/19: 5' 7.01\" (1.702 m). Weight as of this encounter: 309 lb (140.2 kg).     DIAGNOSTIC FINDINGS:  CBC:  Lab Results   Component Value Date    WBC 8.5 08/06/2018    HGB 15.4 08/06/2018     08/06/2018       BMP:    Lab Results   Component Value Date     08/06/2018    K 4.2 08/06/2018     08/06/2018    CO2 25 08/06/2018    BUN 5 08/06/2018    CREATININE 0.54 08/06/2018    GLUCOSE 95 08/06/2018       HEMOGLOBIN A1C:   Lab Results   Component Value Date    LABA1C 5.2 08/06/2018       FASTING LIPID PANEL:  Lab Results   Component Value Date    CHOL 191 05/02/2013    HDL 57 (L) 05/02/2013    TRIG 115 05/02/2013       Physical Exam   Constitutional: She appears well-developed and well-nourished. No distress. HENT:   Head: Normocephalic. Right Ear: Tympanic membrane and external ear normal. Tympanic membrane is not erythematous and not bulging. Left Ear: Tympanic membrane and external ear normal. Tympanic membrane is not erythematous and not bulging. Nose: Right sinus exhibits no maxillary sinus tenderness and no frontal sinus tenderness. Left sinus exhibits no maxillary sinus tenderness and no frontal sinus tenderness. Mouth/Throat: Uvula is midline. Mucous membranes are not dry. No uvula swelling. No oropharyngeal exudate or posterior oropharyngeal erythema. No tonsillar exudate. Eyes: Conjunctivae are normal. Right eye exhibits no discharge. Left eye exhibits no discharge. Neck: Neck supple. Cardiovascular: Normal rate and regular rhythm. Pulmonary/Chest: Effort normal. No respiratory distress. She has no wheezes. Abdominal: Soft. There is no tenderness. Lymphadenopathy:        Head (right side): No submental adenopathy present. Head (left side): No submental adenopathy present. She has no cervical adenopathy. Skin: Skin is warm. Vitals reviewed. ASSESSMENT     Diagnosis Orders   1. Moderate persistent asthma with acute exacerbation  Respiratory Therapy Supplies (NEBULIZER/TUBING/MOUTHPIECE) KIT   2.  Chronic obstructive pulmonary disease, unspecified COPD type ([facility] [address] Respiratory Therapy Supplies (NEBULIZER/TUBING/MOUTHPIECE) KIT   3. Urge incontinence of urine  Incontinence Supplies MISC   4. Positive depression screening  Positive Screen for Clinical Depression with a Documented Follow-up Plan           PLAN:  Orders Placed This Encounter   Medications    Respiratory Therapy Supplies (NEBULIZER/TUBING/MOUTHPIECE) KIT     Si kit by Does not apply route daily as needed (as needed for wheezing)     Dispense:  1 kit     Refill:  1    Incontinence Supplies MISC     Si Units by Does not apply route 5 times daily     Dispense:  200 each     Refill:  5    albuterol sulfate HFA (PROVENTIL HFA) 108 (90 Base) MCG/ACT inhaler     Sig: Inhale 1-2 puffs into the lungs every 4 hours as needed for Wheezing or Shortness of Breath (Space out to every 6 hours as symptoms improve)     Dispense:  1 Inhaler     Refill:  5         1. Roberto Perez declines any long acting inhaler despite using albuterol inhaler. Stable today  2. Supplies ordered. 3. C.w timed toileting to minimize overflow    FOLLOW UP AND INSTRUCTIONS:  Return in about 6 months (around 11/3/2019). · Roberto Perez received counseling on the following healthy behaviors:nutrition and medication adherence    · Discussed use, benefit, and side effects of prescribed medications. Barriers to  medication compliance addressed. All patient questions answered. Pt  verbalized understanding of all instructions given. · Patient given educational materials - see patient instructions      · Patient advised to contact scheduling offices for any referrals or imaging orders  placed today if they have not been contacted in 48 hours. Return in about 6 months (around 11/3/2019). An electronic signature was used to authenticate this note.     --LAWRENCE Moulton CNP on 5/3/2019 at 10:18 AM  Visit Information    Have you changed or started any medications since your last visit including any over-the-counter medicines, vitamins, or herbal medicines? no   Are you having any side effects from any of your medications? -  no  Have you stopped taking any of your medications? Is so, why? -  no    Have you seen any other physician or provider since your last visit? Yes - Records Requested  Have you had any other diagnostic tests since your last visit? Yes - Records Requested  Have you been seen in the emergency room and/or had an admission to a hospital since we last saw you? Yes - Records Requested  Have you had your routine dental cleaning in the past 6 months? no    Have you activated your [employer] account? If not, what are your barriers? Yes     Patient Care Team:  LAWRENCE Justice CNP as PCP - General (Family Medicine)    Medical History Review  Past Medical, Family, and Social History reviewed and does contribute to the patient presenting condition    Health Maintenance   Topic Date Due    HIV screen  09/25/1985    DTaP/Tdap/Td vaccine (1 - Tdap) 09/25/1989    Cervical cancer screen  09/25/1991    Lipid screen  05/02/2018    Potassium monitoring  08/06/2019    Creatinine monitoring  08/06/2019    Flu vaccine  Completed    Pneumococcal 0-64 years Vaccine  Completed     On the basis of positive PHQ-9 screening ( ), the following plan was implemented: patient declines further evaluation/treatment for depression.   Patient will follow-up in 3 week(s) with psychiatrist.

## 2019-05-03 NOTE — PATIENT INSTRUCTIONS
Patient Education        tiotropium inhalation  Pronunciation:  JENNIFER evelyne travis um  Brand:  Spiriva  What is the most important information I should know about tiotropium inhalation? A tiotropium capsule is for use only in the HandiHaler device. Do not take the capsule by mouth. Use only one capsule at a time. What is tiotropium inhalation? Tiotropium is a bronchodilator that relaxes muscles in the airways and increases air flow to the lungs. Tiotropium inhalation is used to prevent bronchospasm (narrowing of the airways in the lungs) in adults with COPD (chronic obstructive pulmonary disease), including bronchitis and emphysema. Tiotropium inhalation is also used to prevent asthma attacks in adults and children who are at least 10years old. Tiotropium inhalation may also be used for purposes not listed in this medication guide. What should I discuss with my healthcare provider before using tiotropium inhalation? You should not use this medicine if you are allergic to tiotropium or ipratropium (Atrovent, Combivent, DuoNeb). To make sure this medicine is safe for you, tell your doctor if you have:  · narrow-angle glaucoma;  · kidney disease;  · enlarged prostate or urination problems;  · if you are allergic to milk; or  · if you also take medicine to treat Parkinson's disease, overactive bladder, irritable bowel syndrome, depression, high blood pressure, or muscle spasms. It is not known whether this medicine will harm an unborn baby. Tell your doctor if you are pregnant or plan to become pregnant. It is not known whether tiotropium inhalation passes into breast milk or if it could affect the nursing baby. Tell your doctor if you are breast-feeding. Tiotropium inhalation is not approved for use by anyone younger than 10years old. How should I use tiotropium inhalation? The usual dose of this medicine is 2 inhalations (puffs) through the mouth once daily.  Follow all directions on your prescription label. Do not use this medicine in larger or smaller amounts or for longer than recommended. Tiotropium inhalation is not a rescue medicine. It will not work fast enough to treat a COPD flare-up. Your doctor may prescribe a fast-acting inhalation medicine to treat a bronchospasm attack. Tell your doctor if it seems like your medications don't work as well. Read all patient information, medication guides, and instruction sheets provided to you. Ask your doctor or pharmacist if you have any questions. Any child using tiotropium inhalation should be supervised by an adult while using this medicine. Tiotropium inhalation powder (Spiriva HandiHaler) is packaged in capsules that come with a special inhaler device. Each time you use the medicine, load a capsule into the device and click the mouthpiece closed. Push the button on the side of the device to aguilera the capsule and release the medicine into the inhalation chamber. You will need 2 inhalations to get the full dose from 1 capsule. Do not take the capsule by mouth. Tiotropium capsules are for use only in the HandiHaler device. Use only 1 capsule at a time. Do not use the HandiHaler device to take any other medicine. Keep each tiotropium capsule in its blister pack until you are ready to place the capsule into the HandiHaler and use the device. Throw away any capsule that is not used right after you have taken it out of the blister pack. Exposure to air can ruin the capsule. Tiotropium inhalation aerosol (Spiriva Respimat) comes in a cartridge that is inserted into a special inhaler device provided with this medicine. Turning the base of the Respimat device until it clicks will release the medicine into the inhaler chamber. You will need 2 puffs to get a full dose. The Spiriva Respimat inhaler will lock and cannot be turned after 30 doses (or 60 puffs) have been used.  An indicator on the side of the inhaler will show when there are 14 puffs (or 7 days) of you urinate; or  · little or no urinating. Common side effects may include:  · dry mouth;  · blurred vision;  · constipation, painful urination;  · upset stomach;  · chest pain; or  · cold symptoms such as stuffy or runny nose, sinus pain, sore throat. This is not a complete list of side effects and others may occur. Call your doctor for medical advice about side effects. You may report side effects to FDA at 2-461-JUU-4339. What other drugs will affect tiotropium inhalation? Other drugs may interact with tiotropium inhalation, including prescription and over-the-counter medicines, eye drops, vitamins, and herbal products. Tell each of your health care providers about all medicines you use now and any medicine you start or stop using. Where can I get more information? Your pharmacist can provide more information about tiotropium inhalation. Remember, keep this and all other medicines out of the reach of children, never share your medicines with others, and use this medication only for the indication prescribed. Every effort has been made to ensure that the information provided by [address] is accurate, up-to-date, and complete, but no guarantee is made to that effect. Drug information contained herein may be time sensitive. [facility] information has been compiled for use by healthcare practitioners and consumers in the [place] and therefore [facility] does not warrant that uses outside of the [place] are appropriate, unless specifically indicated otherwise. [facility] drug information does not endorse drugs, diagnose patients or recommend therapy. [facility] drug information is an informational resource designed to assist licensed healthcare practitioners in caring for their patients and/or to serve consumers viewing this service as a supplement to, and not a substitute for, the expertise, skill, knowledge and judgment of healthcare practitioners.  The absence of a warning for a given drug or drug combination in no way should be construed to indicate that the drug or drug combination is safe, effective or appropriate for any given patient. [facility] does not assume any responsibility for any aspect of healthcare administered with the aid of information [facility] provides. The information contained herein is not intended to cover all possible uses, directions, precautions, warnings, drug interactions, allergic reactions, or adverse effects. If you have questions about the drugs you are taking, check with your doctor, nurse or pharmacist.  Copyright 1115-9098 [address]. Version: 10.01. Revision date: 3/2/2017. Care instructions adapted under license by [facility] ([facility]). If you have questions about a medical condition or this instruction, always ask your healthcare professional. [place] [address] any warranty or liability for your use of this information.

## 2019-05-03 NOTE — PROGRESS NOTES
On the basis of positive PHQ-9 screening ( ), the following plan was implemented: {[facility] Depression Plan:73579}. Patient will follow-up in {NUMBERS 1-12:10} {DAYS/WEEKS/MONTHS (D):98299} with {[facility] Psych Provider:55735}. Clear

## 2019-05-07 RX ORDER — BACLOFEN 10 MG/1
10 TABLET ORAL 3 TIMES DAILY
Qty: 90 TABLET | Refills: 3 | Status: SHIPPED | OUTPATIENT
Start: 2019-05-07 | End: 2019-12-26 | Stop reason: SDUPTHER

## 2019-05-07 RX ORDER — MELOXICAM 7.5 MG/1
7.5 TABLET ORAL DAILY
Qty: 30 TABLET | Refills: 3 | Status: SHIPPED | OUTPATIENT
Start: 2019-05-07 | End: 2019-06-06

## 2019-05-07 RX ORDER — GABAPENTIN 600 MG/1
600 TABLET ORAL 3 TIMES DAILY
Qty: 90 TABLET | Refills: 3 | Status: SHIPPED | OUTPATIENT
Start: 2019-05-07 | End: 2019-09-13 | Stop reason: ALTCHOICE

## 2019-05-08 ENCOUNTER — OFFICE VISIT (OUTPATIENT)
Dept: ORTHOPEDIC SURGERY | Age: 49
End: 2019-05-08

## 2019-05-08 VITALS — WEIGHT: 293 LBS | HEIGHT: 67 IN | BODY MASS INDEX: 45.99 KG/M2

## 2019-05-08 DIAGNOSIS — M16.11 ARTHRITIS OF RIGHT HIP: Primary | ICD-10-CM

## 2019-05-08 PROCEDURE — 99024 POSTOP FOLLOW-UP VISIT: CPT | Performed by: ORTHOPAEDIC SURGERY

## 2019-05-08 NOTE — PROGRESS NOTES
Subjective:      Patient ID: Kamar Bynum is a 50 y.o. female. HPI  The patient comes in today for follow-up regarding her right hip. She did have her spine surgery and she states that she has recovered more quickly than even the spine surgeon thought she would. She states \"I want to have my hip surgery scheduled\" . The patient was quite belligerent and agitated today. Current Outpatient Medications   Medication Sig Dispense Refill    meloxicam (MOBIC) 7.5 MG tablet Take 1 tablet by mouth daily 30 tablet 3    baclofen (LIORESAL) 10 MG tablet Take 1 tablet by mouth 3 times daily 90 tablet 3    gabapentin (NEURONTIN) 600 MG tablet Take 1 tablet by mouth 3 times daily for 30 days. 90 tablet 3    HYDROcodone-acetaminophen (NORCO) 5-325 MG per tablet take 1 tablet by mouth every 6 hours if needed for pain MAX DAILY AMOUNT: 4 TABLETS  0    Respiratory Therapy Supplies (NEBULIZER/TUBING/MOUTHPIECE) KIT 1 kit by Does not apply route daily as needed (as needed for wheezing) 1 kit 1    Incontinence Supplies MISC 1 Units by Does not apply route 5 times daily 200 each 5    albuterol sulfate HFA (PROVENTIL HFA) 108 (90 Base) MCG/ACT inhaler Inhale 1-2 puffs into the lungs every 4 hours as needed for Wheezing or Shortness of Breath (Space out to every 6 hours as symptoms improve) 1 Inhaler 5    albuterol (PROVENTIL) (2.5 MG/3ML) 0.083% nebulizer solution Take 3 mLs by nebulization every 4 hours as needed for Wheezing 120 each 3    venlafaxine (EFFEXOR XR) 150 MG extended release capsule       lithium 600 MG capsule       lamoTRIgine (LAMICTAL) 25 MG tablet Take 100 mg by mouth 2 times daily       lithium 300 MG tablet Take 300 mg by mouth 2 times daily 450 mg am 900 mg pm       ALPRAZolam (XANAX) 0.5 MG tablet Take 0.5 mg by mouth nightly as needed for Sleep. No current facility-administered medications for this visit.       Review of Systems   Musculoskeletal: Positive for arthralgias, joint swelling and myalgias. Past Medical History:   Diagnosis Date    Acute exacerbation of COPD with asthma ([facility] [address] 16    Anxiety     Asthma     Benign essential HTN     Bipolar 1 disorder ([facility] [address]     COPD (chronic obstructive pulmonary disease) (HCC)     Depression     Fibromyalgia     Headache(784.0)     Pneumonia due to infectious organism 2016    Post traumatic stress disorder      Past Surgical History:   Procedure Laterality Date    CHOLECYSTECTOMY      TONSILLECTOMY       Family History   Problem Relation Age of Onset    Hypertension Mother     Heart Attack Mother     Heart Disease Mother     Hypertension Father     Heart Disease Father     Diabetes Other         cousin    Heart Disease Brother      Social History     Tobacco Use    Smoking status: Former Smoker     Packs/day: 2.00     Years: 20.00     Pack years: 40.00     Types: Cigarettes     Last attempt to quit: 1/10/2010     Years since quittin.3    Smokeless tobacco: Never Used   Substance Use Topics    Alcohol use: No    Drug use: No       Objective:     Vitals:    19 1309   Weight: (!) 309 lb 1.4 oz (140.2 kg)   Height: 5' 7.01\" (1.702 m)     Physical Exam  I did not perform a physical exam on the patient today secondary to her belligerent nature. Radiology:            Impression:        Assessment:     Visit Diagnoses       Codes    Arthritis of right hip    -  Primary M16.11           Plan:     I discussed with the patient that I am not comfortable proceeding with right total hip arthroplasty given her words and actions today. She continued with expletives and threatened to gage me. There is nothing more I have to offer the patient.      Please be aware that portions of this chart note were created using voice recognition software and that unforseen errors may have occured   Electronically signed by Roseann Hopson MD on 2019 at 2:30 PM

## 2019-05-31 ENCOUNTER — HOSPITAL ENCOUNTER (OUTPATIENT)
Dept: PAIN MANAGEMENT | Age: 49
Discharge: HOME | End: 2019-05-31
Payer: COMMERCIAL

## 2019-05-31 ENCOUNTER — OFFICE VISIT (OUTPATIENT)
Dept: PRIMARY CARE CLINIC | Age: 49
End: 2019-05-31
Payer: COMMERCIAL

## 2019-05-31 VITALS
DIASTOLIC BLOOD PRESSURE: 87 MMHG | BODY MASS INDEX: 45.99 KG/M2 | HEART RATE: 88 BPM | SYSTOLIC BLOOD PRESSURE: 146 MMHG | HEIGHT: 67 IN | OXYGEN SATURATION: 97 % | WEIGHT: 293 LBS

## 2019-05-31 VITALS
BODY MASS INDEX: 49.01 KG/M2 | TEMPERATURE: 97 F | WEIGHT: 293 LBS | SYSTOLIC BLOOD PRESSURE: 150 MMHG | DIASTOLIC BLOOD PRESSURE: 89 MMHG | HEART RATE: 81 BPM | OXYGEN SATURATION: 96 %

## 2019-05-31 DIAGNOSIS — Z01.818 PREOPERATIVE EXAMINATION: ICD-10-CM

## 2019-05-31 DIAGNOSIS — Z01.818 PREOP GENERAL PHYSICAL EXAM: Primary | ICD-10-CM

## 2019-05-31 PROCEDURE — 99213 OFFICE O/P EST LOW 20 MIN: CPT

## 2019-05-31 PROCEDURE — 99213 OFFICE O/P EST LOW 20 MIN: CPT | Performed by: PAIN MEDICINE

## 2019-05-31 PROCEDURE — G8427 DOCREV CUR MEDS BY ELIG CLIN: HCPCS | Performed by: NURSE PRACTITIONER

## 2019-05-31 PROCEDURE — G8417 CALC BMI ABV UP PARAM F/U: HCPCS | Performed by: NURSE PRACTITIONER

## 2019-05-31 PROCEDURE — 99242 OFF/OP CONSLTJ NEW/EST SF 20: CPT | Performed by: NURSE PRACTITIONER

## 2019-05-31 ASSESSMENT — PAIN SCALES - GENERAL: PAINLEVEL_OUTOF10: 10

## 2019-05-31 ASSESSMENT — ENCOUNTER SYMPTOMS
BLURRED VISION: 1
ABDOMINAL PAIN: 0
COUGH: 1

## 2019-05-31 ASSESSMENT — PAIN DESCRIPTION - LOCATION: LOCATION: HIP;LEG;KNEE;FOOT

## 2019-05-31 ASSESSMENT — PAIN DESCRIPTION - PAIN TYPE: TYPE: ACUTE PAIN

## 2019-05-31 ASSESSMENT — PAIN DESCRIPTION - FREQUENCY: FREQUENCY: CONTINUOUS

## 2019-05-31 ASSESSMENT — PAIN DESCRIPTION - DESCRIPTORS: DESCRIPTORS: ACHING;THROBBING

## 2019-05-31 ASSESSMENT — PAIN DESCRIPTION - ORIENTATION: ORIENTATION: LEFT;RIGHT

## 2019-05-31 NOTE — PATIENT INSTRUCTIONS
Betty Garg,    This is June 17, 2019 from 11:30am-12:30pm. LAWRENCE Aguilar - CNP has asked me to invite you to become a member of our [place] that is dedicated to improving our practice. We value the opinions of our patients at [facility]. That is why our Patient & Family Advisory [place] pulls together patients, family members, caregivers, office care team members and the providers to discuss improving the health care services provided by the practice as well as the patient and family health care experience. The [place] is doing this by identify opportunities for improvement through:    - Gathering and providing feedback on the practice environment   - Discussing operational and clinical workflows   - Improving staff and provider interactions    Patient & Family Advisors are expected to attend meetings once per quarter and the meetings will be held either in our office or another convenient location. Light refreshments or a meal will be provided for the meeting as well! If you are interested in being a part of this very important [place] that is dedicated to creating safe, reliable and quality healthcare, please call the office and any staff member can assist you with the details of our next [place] meeting!     Thank you for your consideration in being apart of our team,  [address] [place] [address] Team

## 2019-05-31 NOTE — PROGRESS NOTES
HPI:     Comes in for follow-up for multiple pain complaints including her low back as well as her bilateral hips. Tonic constant aching pain that is worse with activity and improved with rest.  Rates the pain as moderate severe depending on what she doing. Feels the pain is relatively stable. myelogram of the low back with degenerative changes. She has recently had L2 3 decompression at [facility]. She has further degenerative changes and lower lumbar spine as well. 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 She has surgery pending. Mobic 7.5 mg once a day with no benefit. Takes Neurontin 600 mg 3 times a day with some benefit for her radicular symptoms. Patient denies any new neurological symptoms. No bowel or bladder incontinence, no weakness, and no falling. Review of OARRS does not show any aberrant prescription behavior. Medication is helping the patient stay active. Patient denies any side effects and reports adequate analgesia. No sign of misuse/abuse.          Past Medical History:   Diagnosis Date    Acute exacerbation of COPD with asthma ([facility] [address] 6/7/16    Anxiety     Asthma     Benign essential HTN     Bipolar 1 disorder (HCC)     COPD (chronic obstructive pulmonary disease) (HCC)     Depression     Fibromyalgia     Headache(784.0)     Pneumonia due to infectious organism 6/7/2016    Post traumatic stress disorder        Past Surgical History:   Procedure Laterality Date    CHOLECYSTECTOMY      TONSILLECTOMY         Allergies   Allergen Reactions    Food Anaphylaxis     raisins    Penicillins Anaphylaxis    Parafon Forte Dsc [Chlorzoxazone] Hives    Biaxin [Clarithromycin] Rash         Current Outpatient Medications:     meloxicam (MOBIC) 7.5 MG tablet, Take 1 tablet by mouth daily, Disp: 30 tablet, Rfl: 3    baclofen (LIORESAL) 10 MG tablet, Take 1 tablet by mouth 3 times daily, Disp: 90 tablet, Rfl: 3    gabapentin (NEURONTIN) 600 MG tablet, Take 1 tablet by mouth 3 times daily for 30 days. , Disp: 90 tablet, Rfl: 3    albuterol sulfate HFA (PROVENTIL HFA) 108 (90 Base) MCG/ACT inhaler, Inhale 1-2 puffs into the lungs every 4 hours as needed for Wheezing or Shortness of Breath (Space out to every 6 hours as symptoms improve), Disp: 1 Inhaler, Rfl: 5    albuterol (PROVENTIL) (2.5 MG/3ML) 0.083% nebulizer solution, Take 3 mLs by nebulization every 4 hours as needed for Wheezing, Disp: 120 each, Rfl: 3    venlafaxine (EFFEXOR XR) 150 MG extended release capsule, , Disp: , Rfl:     lithium 600 MG capsule, , Disp: , Rfl:     lamoTRIgine (LAMICTAL) 25 MG tablet, Take 100 mg by mouth 2 times daily , Disp: , Rfl:     lithium 300 MG tablet, Take 300 mg by mouth 2 times daily 450 mg am 900 mg pm , Disp: , Rfl:     Respiratory Therapy Supplies (NEBULIZER/TUBING/MOUTHPIECE) KIT, 1 kit by Does not apply route daily as needed (as needed for wheezing), Disp: 1 kit, Rfl: 1    Incontinence Supplies MISC, 1 Units by Does not apply route 5 times daily, Disp: 200 each, Rfl: 5    ALPRAZolam (XANAX) 0.5 MG tablet, Take 0.5 mg by mouth nightly as needed for Sleep., Disp: , Rfl:     Family History   Problem Relation Age of Onset    Hypertension Mother     Heart Attack Mother     Heart Disease Mother     Hypertension Father     Heart Disease Father     Diabetes Other         cousin    Heart Disease Brother        Social History     Socioeconomic History    Marital status:       Spouse name: Not on file    Number of children: Not on file    Years of education: Not on file    Highest education level: Not on file   Occupational History    Not on file   Social Needs    Financial resource strain: Not on file    Food insecurity:     Worry: Not on file     Inability: Not on file    Transportation needs:     Medical: Not on file     Non-medical: Not on file   Tobacco Use    Smoking status: Former Smoker     Packs/day: 2.00     Years: 20.00     Pack years: 40.00     Types:

## 2019-05-31 NOTE — PROGRESS NOTES
Subjective:      Leighton Vieira is a 50 y.o. female who presents to the office today for a preoperative consultation at the request of surgeon Tyson Silvestre who plans on performing right hip repalcement on TBD not applicable. This consultation is requested for the specific conditions prompting preoperative evaluation (i.e. because of potential affect on operative risk): COPD. Planned anesthesia is General.  The patient has the following known anesthesia issues: none  Patient has a bleeding risk of : no recent abnormal bleeding, no remote history of abnormal bleeding, no use of Ca-channel blockers  Patient does not have objection to receiving blood products if needed. Past Medical History:   Diagnosis Date    Acute exacerbation of COPD with asthma ([facility] [address] 6/7/16    Anxiety     Asthma     Benign essential HTN     Bipolar 1 disorder ([facility] [address]     COPD (chronic obstructive pulmonary disease) ([facility] [address]     Depression     Fibromyalgia     Headache(784.0)     Pneumonia due to infectious organism 6/7/2016    Post traumatic stress disorder      Review of Systems  Constitutional: negative  Ears, nose, mouth, throat, and face: negative  Respiratory: positive for dyspnea on exertion and wheezing  Cardiovascular: negative  Gastrointestinal: negative  Genitourinary:negative  Hematologic/lymphatic: negative  Musculoskeletal:negative  Neurological: negative  Allergic/Immunologic: negative      Objective:      BP (!) 152/96 (Site: Right Lower Arm, Position: Sitting, Cuff Size: Medium Adult)   Pulse 81   Temp 97 °F (36.1 °C) (Oral)   Wt (!) 313 lb (142 kg)   LMP 02/20/2019 (Approximate)   SpO2 96%   BMI 49.01 kg/m²   General appearance: alert, appears stated age, cooperative and morbidly obese  Head: Normocephalic, without obvious abnormality, atraumatic  Eyes: conjunctivae/corneas clear. PERRL, EOM's intact. Fundi benign.   Ears: normal TM's and external ear canals both ears  Throat: lips, mucosa, and tongue normal; teeth and 04/11/2019 4.1     Alkaline Phosphatase 04/11/2019 71     Total Bilirubin 04/11/2019 0.5     Anion Gap 04/11/2019 8     Cholesterol, Total 04/11/2019 172     HDL 04/11/2019 52     LDL Calculated 04/11/2019 61     Triglycerides 04/11/2019 294     Chol/HDL Ratio 04/11/2019 3.3     VLDL 04/11/2019 59     TSH 04/11/2019 3.54          Assessment:        50 y.o. female with planned surgery as above. Known risk factors for perioperative complications: Chronic pulmonary disease    Difficulty with intubation is not anticipated. Cardiac Risk Estimation: per the Revised Cardiac Risk Index (Circ. 100:1043, 1999), the patient's risk factors for cardiac complications include N/A, putting her in: RCI RISK CLASS I (0 risk factors, risk of major cardiac compl. appr. 0.5%)    Current medications which may produce withdrawal symptoms if withheld perioperatively: Effexor       Plan:      1. Preoperative workup as follows chest x-ray, ECG, electrolytes, creatinine, pul evaluation d/t untreated ALLYSON  2. Change in medication regimen before surgery: discontinue NSAIDs (Meloxicam) 14d before surgery  3. Prophylaxis for cardiac events with perioperative beta-blockers: not indicated  4. Invasive hemodynamic monitoring perioperatively: not indicated  5. Deep vein thrombosis prophylaxis postoperatively:regimen to be chosen by surgical team  6.  Fax letter to Dr Vincent Shukla at [facility] once testing and labs resulted, P: [phone] or [phone]

## 2019-06-03 ENCOUNTER — HOSPITAL ENCOUNTER (OUTPATIENT)
Age: 49
Discharge: HOME | End: 2019-06-05
Payer: COMMERCIAL

## 2019-06-03 ENCOUNTER — HOSPITAL ENCOUNTER (OUTPATIENT)
Age: 49
Discharge: HOME | End: 2019-06-03
Payer: COMMERCIAL

## 2019-06-03 ENCOUNTER — HOSPITAL ENCOUNTER (OUTPATIENT)
Dept: GENERAL RADIOLOGY | Age: 49
Discharge: HOME | End: 2019-06-05
Payer: COMMERCIAL

## 2019-06-03 DIAGNOSIS — Z01.818 PREOP GENERAL PHYSICAL EXAM: ICD-10-CM

## 2019-06-03 LAB
ABSOLUTE EOS #: 1.82 K/UL (ref 0–0.44)
ABSOLUTE IMMATURE GRANULOCYTE: <0.03 K/UL (ref 0–0.3)
ABSOLUTE LYMPH #: 2.45 K/UL (ref 1.1–3.7)
ABSOLUTE MONO #: 0.76 K/UL (ref 0.1–1.2)
ALBUMIN SERPL-MCNC: 4.2 G/DL (ref 3.5–5.2)
ALBUMIN/GLOBULIN RATIO: 1.4 (ref 1–2.5)
ALP BLD-CCNC: 91 U/L (ref 35–104)
ALT SERPL-CCNC: 11 U/L (ref 5–33)
ANION GAP SERPL CALCULATED.3IONS-SCNC: 11 MMOL/L (ref 9–17)
AST SERPL-CCNC: 14 U/L
BASOPHILS # BLD: 1 % (ref 0–2)
BASOPHILS ABSOLUTE: 0.07 K/UL (ref 0–0.2)
BILIRUB SERPL-MCNC: 0.24 MG/DL (ref 0.3–1.2)
BUN BLDV-MCNC: 8 MG/DL (ref 6–20)
BUN/CREAT BLD: ABNORMAL (ref 9–20)
CALCIUM SERPL-MCNC: 9.5 MG/DL (ref 8.6–10.4)
CHLORIDE BLD-SCNC: 107 MMOL/L (ref 98–107)
CO2: 24 MMOL/L (ref 20–31)
CREAT SERPL-MCNC: 0.65 MG/DL (ref 0.5–0.9)
DIFFERENTIAL TYPE: ABNORMAL
EKG ATRIAL RATE: 70 BPM
EKG P AXIS: 29 DEGREES
EKG P-R INTERVAL: 160 MS
EKG Q-T INTERVAL: 412 MS
EKG QRS DURATION: 86 MS
EKG QTC CALCULATION (BAZETT): 444 MS
EKG R AXIS: 43 DEGREES
EKG T AXIS: 53 DEGREES
EKG VENTRICULAR RATE: 70 BPM
EOSINOPHILS RELATIVE PERCENT: 19 % (ref 1–4)
GFR AFRICAN AMERICAN: >60 ML/MIN
GFR NON-AFRICAN AMERICAN: >60 ML/MIN
GFR SERPL CREATININE-BSD FRML MDRD: ABNORMAL ML/MIN/{1.73_M2}
GFR SERPL CREATININE-BSD FRML MDRD: ABNORMAL ML/MIN/{1.73_M2}
GLUCOSE BLD-MCNC: 117 MG/DL (ref 70–99)
HCT VFR BLD CALC: 46 % (ref 36.3–47.1)
HEMOGLOBIN: 14.8 G/DL (ref 11.9–15.1)
IMMATURE GRANULOCYTES: 0 %
LYMPHOCYTES # BLD: 26 % (ref 24–43)
MCH RBC QN AUTO: 31.4 PG (ref 25.2–33.5)
MCHC RBC AUTO-ENTMCNC: 32.2 G/DL (ref 28.4–34.8)
MCV RBC AUTO: 97.7 FL (ref 82.6–102.9)
MONOCYTES # BLD: 8 % (ref 3–12)
NRBC AUTOMATED: 0 PER 100 WBC
PDW BLD-RTO: 12.5 % (ref 11.8–14.4)
PLATELET # BLD: 289 K/UL (ref 138–453)
PLATELET ESTIMATE: ABNORMAL
PMV BLD AUTO: 10.9 FL (ref 8.1–13.5)
POTASSIUM SERPL-SCNC: 3.7 MMOL/L (ref 3.7–5.3)
RBC # BLD: 4.71 M/UL (ref 3.95–5.11)
RBC # BLD: ABNORMAL 10*6/UL
SEG NEUTROPHILS: 46 % (ref 36–65)
SEGMENTED NEUTROPHILS ABSOLUTE COUNT: 4.42 K/UL (ref 1.5–8.1)
SODIUM BLD-SCNC: 142 MMOL/L (ref 135–144)
TOTAL PROTEIN: 7.3 G/DL (ref 6.4–8.3)
WBC # BLD: 9.5 K/UL (ref 3.5–11.3)
WBC # BLD: ABNORMAL 10*3/UL

## 2019-06-03 PROCEDURE — 85025 COMPLETE CBC W/AUTO DIFF WBC: CPT

## 2019-06-03 PROCEDURE — 71046 X-RAY EXAM CHEST 2 VIEWS: CPT

## 2019-06-03 PROCEDURE — 36415 COLL VENOUS BLD VENIPUNCTURE: CPT

## 2019-06-03 PROCEDURE — 93005 ELECTROCARDIOGRAM TRACING: CPT | Performed by: NURSE PRACTITIONER

## 2019-06-03 PROCEDURE — 80053 COMPREHEN METABOLIC PANEL: CPT

## 2019-06-06 RX ORDER — MELOXICAM 15 MG/1
15 TABLET ORAL DAILY
Qty: 30 TABLET | Refills: 3 | Status: SHIPPED | OUTPATIENT
Start: 2019-06-06 | End: 2019-08-02 | Stop reason: SDUPTHER

## 2019-06-13 RX ORDER — GABAPENTIN 600 MG/1
600 TABLET ORAL 3 TIMES DAILY
Qty: 90 TABLET | Refills: 0 | Status: SHIPPED | OUTPATIENT
Start: 2019-06-13 | End: 2019-08-02 | Stop reason: SDUPTHER

## 2019-06-13 RX ORDER — MELOXICAM 7.5 MG/1
7.5 TABLET ORAL 2 TIMES DAILY
Qty: 60 TABLET | Refills: 0 | Status: SHIPPED | OUTPATIENT
Start: 2019-06-13 | End: 2019-08-02 | Stop reason: ALTCHOICE

## 2019-07-26 ENCOUNTER — TELEPHONE (OUTPATIENT)
Dept: BARIATRICS/WEIGHT MGMT | Age: 49
End: 2019-07-26

## 2019-07-26 NOTE — TELEPHONE ENCOUNTER
Online Info Session Completed:  on 7/25/19 okay to schedule with either surgeon. Verified Insurance Benefit   with [facility]    Appointment Note :   New Patient , [facility],   6   month visits,  PG Fee $350,  Mailed Packet or advised to arrive  early      Remind Patient of $350 Program fee with $ 100 required at Second visit with office on initial dietician visit. Remind Patient they must be nicotine free. They will be tested at the beginning of the program and prior to surgery. Advise Patient Responsible for out of pocket, copay at medical visits, Deductible and coinsurance applied to medical visits and procedure. You will be responsible for any of the following:  · Copays   · Deductibles   · Co insurances     The items mentioned above are indicated or required by your insurance plan. Your deductible and coinsurance are applied to medical visits and procedures. Verified with patient if he or she has had any previous bariatric surgery? no  ( If yes ,advise patient of transfer of care process and program fee)       .

## 2019-08-02 ENCOUNTER — OFFICE VISIT (OUTPATIENT)
Dept: PAIN MANAGEMENT | Age: 49
End: 2019-08-02
Payer: COMMERCIAL

## 2019-08-02 VITALS
BODY MASS INDEX: 49.02 KG/M2 | HEIGHT: 67 IN | SYSTOLIC BLOOD PRESSURE: 134 MMHG | DIASTOLIC BLOOD PRESSURE: 82 MMHG | HEART RATE: 92 BPM

## 2019-08-02 DIAGNOSIS — M54.42 CHRONIC BILATERAL LOW BACK PAIN WITH BILATERAL SCIATICA: Chronic | ICD-10-CM

## 2019-08-02 DIAGNOSIS — M25.551 PAIN OF BOTH HIP JOINTS: ICD-10-CM

## 2019-08-02 DIAGNOSIS — M25.552 PAIN OF BOTH HIP JOINTS: ICD-10-CM

## 2019-08-02 DIAGNOSIS — M79.7 FIBROMYALGIA: Primary | Chronic | ICD-10-CM

## 2019-08-02 DIAGNOSIS — M54.41 CHRONIC BILATERAL LOW BACK PAIN WITH BILATERAL SCIATICA: Chronic | ICD-10-CM

## 2019-08-02 DIAGNOSIS — G89.29 CHRONIC BILATERAL LOW BACK PAIN WITH BILATERAL SCIATICA: Chronic | ICD-10-CM

## 2019-08-02 PROCEDURE — 99213 OFFICE O/P EST LOW 20 MIN: CPT | Performed by: PAIN MEDICINE

## 2019-08-02 RX ORDER — MIRTAZAPINE 15 MG/1
TABLET, FILM COATED ORAL
Refills: 0 | Status: ON HOLD | COMMUNITY
Start: 2019-07-31 | End: 2019-12-05

## 2019-08-02 RX ORDER — GABAPENTIN 600 MG/1
600 TABLET ORAL 3 TIMES DAILY
Qty: 90 TABLET | Refills: 3 | Status: SHIPPED | OUTPATIENT
Start: 2019-08-02 | End: 2020-01-23 | Stop reason: SDUPTHER

## 2019-08-02 RX ORDER — MELOXICAM 15 MG/1
15 TABLET ORAL DAILY
Qty: 30 TABLET | Refills: 3 | Status: ON HOLD | OUTPATIENT
Start: 2019-08-02 | End: 2019-12-05

## 2019-08-02 ASSESSMENT — ENCOUNTER SYMPTOMS
SORE THROAT: 0
EYE PAIN: 0
WHEEZING: 1
BLURRED VISION: 0
COUGH: 1

## 2019-08-22 DIAGNOSIS — J44.9 CHRONIC OBSTRUCTIVE PULMONARY DISEASE, UNSPECIFIED COPD TYPE (HCC): Primary | ICD-10-CM

## 2019-08-22 DIAGNOSIS — G89.29 CHRONIC BILATERAL LOW BACK PAIN WITH BILATERAL SCIATICA: Chronic | ICD-10-CM

## 2019-08-22 DIAGNOSIS — M54.42 CHRONIC BILATERAL LOW BACK PAIN WITH BILATERAL SCIATICA: Chronic | ICD-10-CM

## 2019-08-22 DIAGNOSIS — M54.41 CHRONIC BILATERAL LOW BACK PAIN WITH BILATERAL SCIATICA: Chronic | ICD-10-CM

## 2019-08-23 ENCOUNTER — TELEPHONE (OUTPATIENT)
Dept: PRIMARY CARE CLINIC | Age: 49
End: 2019-08-23

## 2019-08-23 DIAGNOSIS — J44.9 CHRONIC OBSTRUCTIVE PULMONARY DISEASE, UNSPECIFIED COPD TYPE (HCC): Primary | ICD-10-CM

## 2019-08-23 DIAGNOSIS — G89.29 CHRONIC BILATERAL LOW BACK PAIN WITH BILATERAL SCIATICA: ICD-10-CM

## 2019-08-23 DIAGNOSIS — M54.41 CHRONIC BILATERAL LOW BACK PAIN WITH BILATERAL SCIATICA: ICD-10-CM

## 2019-08-23 DIAGNOSIS — M54.42 CHRONIC BILATERAL LOW BACK PAIN WITH BILATERAL SCIATICA: ICD-10-CM

## 2019-08-26 NOTE — TELEPHONE ENCOUNTER
Pt states she has received 5 phone calls today in regards to this message. Pt states she will walk in to see cindy on the 31st whether or not she is slammed or not and hung up.

## 2019-09-13 ENCOUNTER — HOSPITAL ENCOUNTER (OUTPATIENT)
Age: 49
Discharge: HOME | End: 2019-09-13
Payer: COMMERCIAL

## 2019-09-13 ENCOUNTER — OFFICE VISIT (OUTPATIENT)
Dept: PRIMARY CARE CLINIC | Age: 49
End: 2019-09-13
Payer: COMMERCIAL

## 2019-09-13 VITALS
SYSTOLIC BLOOD PRESSURE: 147 MMHG | DIASTOLIC BLOOD PRESSURE: 91 MMHG | OXYGEN SATURATION: 92 % | TEMPERATURE: 97.7 F | HEART RATE: 94 BPM | BODY MASS INDEX: 50.43 KG/M2 | WEIGHT: 293 LBS

## 2019-09-13 DIAGNOSIS — J45.41 MODERATE PERSISTENT ASTHMA WITH ACUTE EXACERBATION: ICD-10-CM

## 2019-09-13 DIAGNOSIS — M54.41 CHRONIC BILATERAL LOW BACK PAIN WITH BILATERAL SCIATICA: ICD-10-CM

## 2019-09-13 DIAGNOSIS — L98.9 SKIN ERUPTION RESEMBLING PSORIASIS: ICD-10-CM

## 2019-09-13 DIAGNOSIS — M54.42 CHRONIC BILATERAL LOW BACK PAIN WITH BILATERAL SCIATICA: ICD-10-CM

## 2019-09-13 DIAGNOSIS — Z01.818 PREOP EXAMINATION: Primary | ICD-10-CM

## 2019-09-13 DIAGNOSIS — G89.29 CHRONIC BILATERAL LOW BACK PAIN WITH BILATERAL SCIATICA: ICD-10-CM

## 2019-09-13 DIAGNOSIS — J44.9 CHRONIC OBSTRUCTIVE PULMONARY DISEASE, UNSPECIFIED COPD TYPE (HCC): ICD-10-CM

## 2019-09-13 LAB
ABSOLUTE EOS #: 1.13 K/UL (ref 0–0.44)
ABSOLUTE IMMATURE GRANULOCYTE: 0.06 K/UL (ref 0–0.3)
ABSOLUTE LYMPH #: 2.25 K/UL (ref 1.1–3.7)
ABSOLUTE MONO #: 0.55 K/UL (ref 0.1–1.2)
BASOPHILS # BLD: 1 % (ref 0–2)
BASOPHILS ABSOLUTE: 0.06 K/UL (ref 0–0.2)
BUN BLDV-MCNC: 6 MG/DL (ref 6–20)
C-REACTIVE PROTEIN: 6.8 MG/L (ref 0–5)
CREAT SERPL-MCNC: 0.56 MG/DL (ref 0.5–0.9)
DIFFERENTIAL TYPE: ABNORMAL
EOSINOPHILS RELATIVE PERCENT: 13 % (ref 1–4)
GFR AFRICAN AMERICAN: >60 ML/MIN
GFR NON-AFRICAN AMERICAN: >60 ML/MIN
GFR SERPL CREATININE-BSD FRML MDRD: NORMAL ML/MIN/{1.73_M2}
GFR SERPL CREATININE-BSD FRML MDRD: NORMAL ML/MIN/{1.73_M2}
HCT VFR BLD CALC: 43.1 % (ref 36.3–47.1)
HCT VFR BLD CALC: 43.1 % (ref 36.3–47.1)
HEMOGLOBIN: 13.6 G/DL (ref 11.9–15.1)
HEMOGLOBIN: 13.6 G/DL (ref 11.9–15.1)
IMMATURE GRANULOCYTES: 1 %
LITHIUM DATE LAST DOSE: NORMAL
LITHIUM DOSE AMOUNT: NORMAL
LITHIUM DOSE TIME: NORMAL
LITHIUM LEVEL: 0.9 MMOL/L (ref 0.6–1.2)
LYMPHOCYTES # BLD: 27 % (ref 24–43)
MCH RBC QN AUTO: 30.8 PG (ref 25.2–33.5)
MCH RBC QN AUTO: 30.8 PG (ref 25.2–33.5)
MCHC RBC AUTO-ENTMCNC: 31.6 G/DL (ref 28.4–34.8)
MCHC RBC AUTO-ENTMCNC: 31.6 G/DL (ref 28.4–34.8)
MCV RBC AUTO: 97.5 FL (ref 82.6–102.9)
MCV RBC AUTO: 97.5 FL (ref 82.6–102.9)
MONOCYTES # BLD: 7 % (ref 3–12)
NRBC AUTOMATED: 0 PER 100 WBC
NRBC AUTOMATED: 0 PER 100 WBC
PDW BLD-RTO: 13.2 % (ref 11.8–14.4)
PDW BLD-RTO: 13.2 % (ref 11.8–14.4)
PLATELET # BLD: 255 K/UL (ref 138–453)
PLATELET # BLD: 255 K/UL (ref 138–453)
PLATELET ESTIMATE: ABNORMAL
PMV BLD AUTO: 10.8 FL (ref 8.1–13.5)
PMV BLD AUTO: 10.8 FL (ref 8.1–13.5)
RBC # BLD: 4.42 M/UL (ref 3.95–5.11)
RBC # BLD: 4.42 M/UL (ref 3.95–5.11)
RBC # BLD: ABNORMAL 10*6/UL
SEG NEUTROPHILS: 51 % (ref 36–65)
SEGMENTED NEUTROPHILS ABSOLUTE COUNT: 4.36 K/UL (ref 1.5–8.1)
TSH SERPL DL<=0.05 MIU/L-ACNC: 1.82 MIU/L (ref 0.3–5)
WBC # BLD: 8.4 K/UL (ref 3.5–11.3)
WBC # BLD: 8.4 K/UL (ref 3.5–11.3)
WBC # BLD: ABNORMAL 10*3/UL

## 2019-09-13 PROCEDURE — 84520 ASSAY OF UREA NITROGEN: CPT

## 2019-09-13 PROCEDURE — G8926 SPIRO NO PERF OR DOC: HCPCS | Performed by: NURSE PRACTITIONER

## 2019-09-13 PROCEDURE — 99214 OFFICE O/P EST MOD 30 MIN: CPT | Performed by: NURSE PRACTITIONER

## 2019-09-13 PROCEDURE — 85025 COMPLETE CBC W/AUTO DIFF WBC: CPT

## 2019-09-13 PROCEDURE — 82565 ASSAY OF CREATININE: CPT

## 2019-09-13 PROCEDURE — 3023F SPIROM DOC REV: CPT | Performed by: NURSE PRACTITIONER

## 2019-09-13 PROCEDURE — 84443 ASSAY THYROID STIM HORMONE: CPT

## 2019-09-13 PROCEDURE — G8417 CALC BMI ABV UP PARAM F/U: HCPCS | Performed by: NURSE PRACTITIONER

## 2019-09-13 PROCEDURE — 1036F TOBACCO NON-USER: CPT | Performed by: NURSE PRACTITIONER

## 2019-09-13 PROCEDURE — 36415 COLL VENOUS BLD VENIPUNCTURE: CPT

## 2019-09-13 PROCEDURE — 86140 C-REACTIVE PROTEIN: CPT

## 2019-09-13 PROCEDURE — 80178 ASSAY OF LITHIUM: CPT

## 2019-09-13 PROCEDURE — G8427 DOCREV CUR MEDS BY ELIG CLIN: HCPCS | Performed by: NURSE PRACTITIONER

## 2019-09-13 RX ORDER — BETAMETHASONE DIPROPIONATE 0.5 MG/ML
LOTION, AUGMENTED TOPICAL
Qty: 1 BOTTLE | Refills: 0
Start: 2019-09-13 | End: 2019-09-27

## 2019-09-13 RX ORDER — BETAMETHASONE DIPROPIONATE 0.5 MG/ML
LOTION, AUGMENTED TOPICAL
Qty: 1 BOTTLE | Refills: 0 | Status: SHIPPED | OUTPATIENT
Start: 2019-09-13 | End: 2019-09-13

## 2019-09-13 ASSESSMENT — ENCOUNTER SYMPTOMS
BLOOD IN STOOL: 0
COUGH: 1
DIARRHEA: 0
NAUSEA: 0
CHOKING: 0
SHORTNESS OF BREATH: 0
CHEST TIGHTNESS: 0
RHINORRHEA: 1
ABDOMINAL PAIN: 0
APNEA: 0
WHEEZING: 0
EYE PAIN: 0
VOMITING: 0
TROUBLE SWALLOWING: 0
SORE THROAT: 0

## 2019-09-13 NOTE — LETTER
[address] [facility]  [place] [id number]  Phone: [phone]  Fax: [phone]    LAWRENCE Murphy - CNP      9/13/2019      Dr. Natalya Junior    I saw our mutual patient Janee Morris on 09/13/19. She does have a history of morbid obesity and untreated ALLYSON, along with controlled Asthma and COPD, placing her at above average risk for complications. It is okay to proceed with surgery.             Mario Lopez MSN, APRN, NP-C

## 2019-09-13 NOTE — PROGRESS NOTES
mouth      betamethasone, augmented, (DIPROLENE) 0.05 % lotion Apply topically 2 times daily. 1 Bottle 0    Handicap Placard MISC by Does not apply route Exp Jan 2024 1 each 0    gabapentin (NEURONTIN) 600 MG tablet Take 1 tablet by mouth 3 times daily for 30 days. 90 tablet 3    meloxicam (MOBIC) 15 MG tablet Take 1 tablet by mouth daily 30 tablet 3    baclofen (LIORESAL) 10 MG tablet Take 1 tablet by mouth 3 times daily 90 tablet 3    Respiratory Therapy Supplies (NEBULIZER/TUBING/MOUTHPIECE) KIT 1 kit by Does not apply route daily as needed (as needed for wheezing) 1 kit 1    albuterol sulfate HFA (PROVENTIL HFA) 108 (90 Base) MCG/ACT inhaler Inhale 1-2 puffs into the lungs every 4 hours as needed for Wheezing or Shortness of Breath (Space out to every 6 hours as symptoms improve) 1 Inhaler 5    albuterol (PROVENTIL) (2.5 MG/3ML) 0.083% nebulizer solution Take 3 mLs by nebulization every 4 hours as needed for Wheezing 120 each 3    venlafaxine (EFFEXOR XR) 150 MG extended release capsule       lithium 600 MG capsule       lithium 300 MG tablet Take 300 mg by mouth 2 times daily 450 mg am 900 mg pm       ALPRAZolam (XANAX) 0.5 MG tablet Take 0.5 mg by mouth nightly as needed for Sleep.  mirtazapine (REMERON) 15 MG tablet take 1 tablet by mouth at bedtime  0    Incontinence Supplies MISC 1 Units by Does not apply route 5 times daily 200 each 5     No current facility-administered medications for this visit. Allergies   Allergen Reactions    Food Anaphylaxis     raisins    Penicillins Anaphylaxis    Parafon Forte Dsc [Chlorzoxazone] Hives    Biaxin [Clarithromycin] Rash       ROS     Review of Systems   Constitutional: Negative for appetite change, chills, fatigue, fever and unexpected weight change. HENT: Positive for congestion, postnasal drip, rhinorrhea and sneezing. Negative for drooling, hearing loss, sore throat and trouble swallowing.     Eyes: Negative for pain and visual disturbance. Respiratory: Positive for cough. Negative for apnea, choking, chest tightness, shortness of breath and wheezing. Cardiovascular: Negative for chest pain and palpitations. Gastrointestinal: Negative for abdominal pain, blood in stool, diarrhea, nausea and vomiting. Endocrine: Negative for polydipsia and polyuria. Genitourinary: Negative for difficulty urinating, dysuria, frequency, hematuria, vaginal bleeding and vaginal discharge. Musculoskeletal: Positive for gait problem. Negative for myalgias and neck pain. Skin: Negative for rash and wound. Psoriasis on palms that's flared up   Allergic/Immunologic: Positive for environmental allergies. Neurological: Negative for dizziness, seizures, syncope, facial asymmetry, weakness, numbness and headaches. Psychiatric/Behavioral: Negative for hallucinations and suicidal ideas. The patient is not nervous/anxious. BP (!) 147/91   Pulse 94   Temp 97.7 °F (36.5 °C) (Oral)   Wt (!) 322 lb (146.1 kg)   LMP 02/20/2019 (Approximate)   SpO2 92%   BMI 50.43 kg/m²          Physical Exam     Physical Exam   Constitutional: She is oriented to person, place, and time. She appears well-developed and well-nourished. No distress. HENT:   Head: Normocephalic. Right Ear: Tympanic membrane and external ear normal. Tympanic membrane is not erythematous and not bulging. Left Ear: Tympanic membrane and external ear normal. Tympanic membrane is not erythematous and not bulging. Nose: Right sinus exhibits no maxillary sinus tenderness and no frontal sinus tenderness. Left sinus exhibits no maxillary sinus tenderness and no frontal sinus tenderness. Mouth/Throat: Uvula is midline. Mucous membranes are not dry. No uvula swelling. No oropharyngeal exudate or posterior oropharyngeal erythema. No tonsillar exudate. Eyes: Pupils are equal, round, and reactive to light. Conjunctivae and EOM are normal. Right eye exhibits no discharge.  Left eye

## 2019-09-16 DIAGNOSIS — L98.9 SKIN ERUPTION RESEMBLING PSORIASIS: ICD-10-CM

## 2019-09-16 RX ORDER — BETAMETHASONE DIPROPIONATE 0.5 MG/ML
LOTION, AUGMENTED TOPICAL
Qty: 1 BOTTLE | Refills: 0 | OUTPATIENT
Start: 2019-09-16 | End: 2019-09-30

## 2019-09-17 ENCOUNTER — TELEPHONE (OUTPATIENT)
Dept: PRIMARY CARE CLINIC | Age: 49
End: 2019-09-17

## 2019-09-17 RX ORDER — BETAMETHASONE DIPROPIONATE 0.05 %
OINTMENT (GRAM) TOPICAL
Qty: 1 TUBE | Refills: 1 | Status: SHIPPED | OUTPATIENT
Start: 2019-09-17 | End: 2019-12-17

## 2019-09-20 ENCOUNTER — TELEPHONE (OUTPATIENT)
Dept: PRIMARY CARE CLINIC | Age: 49
End: 2019-09-20

## 2019-09-23 RX ORDER — DOXYCYCLINE HYCLATE 100 MG
100 TABLET ORAL 2 TIMES DAILY
Qty: 14 TABLET | Refills: 0 | Status: SHIPPED | OUTPATIENT
Start: 2019-09-23 | End: 2019-09-30

## 2019-09-24 ENCOUNTER — TELEPHONE (OUTPATIENT)
Dept: PRIMARY CARE CLINIC | Age: 49
End: 2019-09-24

## 2019-09-25 ENCOUNTER — APPOINTMENT (OUTPATIENT)
Dept: GENERAL RADIOLOGY | Age: 49
End: 2019-09-25
Payer: COMMERCIAL

## 2019-09-25 ENCOUNTER — HOSPITAL ENCOUNTER (EMERGENCY)
Age: 49
Discharge: HOME | End: 2019-09-25
Attending: EMERGENCY MEDICINE
Payer: COMMERCIAL

## 2019-09-25 VITALS
RESPIRATION RATE: 22 BRPM | HEIGHT: 67 IN | TEMPERATURE: 99.1 F | BODY MASS INDEX: 50.43 KG/M2 | HEART RATE: 85 BPM | DIASTOLIC BLOOD PRESSURE: 82 MMHG | SYSTOLIC BLOOD PRESSURE: 177 MMHG | OXYGEN SATURATION: 95 %

## 2019-09-25 DIAGNOSIS — J45.21 MILD INTERMITTENT ASTHMA WITH EXACERBATION: Primary | ICD-10-CM

## 2019-09-25 DIAGNOSIS — R06.02 SHORTNESS OF BREATH: ICD-10-CM

## 2019-09-25 PROCEDURE — 96374 THER/PROPH/DIAG INJ IV PUSH: CPT

## 2019-09-25 PROCEDURE — 94640 AIRWAY INHALATION TREATMENT: CPT

## 2019-09-25 PROCEDURE — 99285 EMERGENCY DEPT VISIT HI MDM: CPT

## 2019-09-25 PROCEDURE — 6360000002 HC RX W HCPCS: Performed by: STUDENT IN AN ORGANIZED HEALTH CARE EDUCATION/TRAINING PROGRAM

## 2019-09-25 PROCEDURE — 6360000002 HC RX W HCPCS: Performed by: EMERGENCY MEDICINE

## 2019-09-25 PROCEDURE — 71045 X-RAY EXAM CHEST 1 VIEW: CPT

## 2019-09-25 RX ORDER — ALBUTEROL SULFATE 90 UG/1
2 AEROSOL, METERED RESPIRATORY (INHALATION)
Status: DISCONTINUED | OUTPATIENT
Start: 2019-09-25 | End: 2019-09-25 | Stop reason: HOSPADM

## 2019-09-25 RX ORDER — METHYLPREDNISOLONE SODIUM SUCCINATE 125 MG/2ML
125 INJECTION, POWDER, LYOPHILIZED, FOR SOLUTION INTRAMUSCULAR; INTRAVENOUS ONCE
Status: COMPLETED | OUTPATIENT
Start: 2019-09-25 | End: 2019-09-25

## 2019-09-25 RX ORDER — ALBUTEROL SULFATE 90 UG/1
2 AEROSOL, METERED RESPIRATORY (INHALATION) 4 TIMES DAILY PRN
Qty: 1 INHALER | Refills: 0 | Status: SHIPPED | OUTPATIENT
Start: 2019-09-25 | End: 2019-11-12

## 2019-09-25 RX ORDER — ALBUTEROL SULFATE 2.5 MG/3ML
5 SOLUTION RESPIRATORY (INHALATION)
Status: DISCONTINUED | OUTPATIENT
Start: 2019-09-25 | End: 2019-09-25 | Stop reason: HOSPADM

## 2019-09-25 RX ORDER — PREDNISONE 10 MG/1
TABLET ORAL
Qty: 20 TABLET | Refills: 0 | Status: SHIPPED | OUTPATIENT
Start: 2019-09-25 | End: 2019-10-05

## 2019-09-25 RX ORDER — IPRATROPIUM BROMIDE AND ALBUTEROL SULFATE 2.5; .5 MG/3ML; MG/3ML
1 SOLUTION RESPIRATORY (INHALATION)
Status: DISCONTINUED | OUTPATIENT
Start: 2019-09-25 | End: 2019-09-25 | Stop reason: HOSPADM

## 2019-09-25 RX ADMIN — METHYLPREDNISOLONE SODIUM SUCCINATE 125 MG: 125 INJECTION, POWDER, FOR SOLUTION INTRAMUSCULAR; INTRAVENOUS at 04:44

## 2019-09-25 RX ADMIN — IPRATROPIUM BROMIDE 0.5 MG: 0.5 SOLUTION RESPIRATORY (INHALATION) at 04:19

## 2019-09-25 RX ADMIN — ALBUTEROL SULFATE 5 MG: 5 SOLUTION RESPIRATORY (INHALATION) at 04:19

## 2019-09-25 ASSESSMENT — PAIN SCALES - GENERAL: PAINLEVEL_OUTOF10: 2

## 2019-09-25 ASSESSMENT — ENCOUNTER SYMPTOMS
SINUS PRESSURE: 1
ABDOMINAL PAIN: 0
SORE THROAT: 0
SINUS PAIN: 1
VOMITING: 0
NAUSEA: 0
WHEEZING: 1
COUGH: 1
PHOTOPHOBIA: 0
SHORTNESS OF BREATH: 1

## 2019-09-25 ASSESSMENT — PAIN DESCRIPTION - LOCATION: LOCATION: CHEST

## 2019-09-25 ASSESSMENT — PAIN DESCRIPTION - DESCRIPTORS: DESCRIPTORS: DISCOMFORT;DULL

## 2019-09-25 ASSESSMENT — PAIN DESCRIPTION - FREQUENCY: FREQUENCY: INTERMITTENT

## 2019-09-25 NOTE — ED PROVIDER NOTES
5)      Review of Systems   Constitutional: Negative for chills, diaphoresis, fatigue and fever. HENT: Positive for congestion, sinus pressure and sinus pain. Negative for sore throat. Eyes: Negative for photophobia and visual disturbance. Respiratory: Positive for cough, shortness of breath and wheezing. Cardiovascular: Negative for chest pain and palpitations. Gastrointestinal: Negative for abdominal pain, nausea and vomiting. Genitourinary: Negative for dysuria and hematuria. Musculoskeletal: Negative for arthralgias and myalgias. Skin: Negative for rash and wound. Neurological: Negative for weakness and numbness. PHYSICAL EXAM   (up to 7 for level 4, 8 or more for level 5)      INITIAL VITALS:   BP (!) 129/96   Pulse 90   Temp 99.1 °F (37.3 °C) (Oral)   Resp 24   Ht 5' 7\" (1.702 m)   LMP 02/20/2019 (Approximate)   SpO2 96%   BMI 50.43 kg/m²     Physical Exam   Constitutional: She is oriented to person, place, and time. She appears well-developed and well-nourished. No distress. HENT:   Head: Normocephalic and atraumatic. Right Ear: External ear normal.   Left Ear: External ear normal.   Nose: No rhinorrhea. Right sinus exhibits maxillary sinus tenderness and frontal sinus tenderness. Left sinus exhibits maxillary sinus tenderness and frontal sinus tenderness. Mouth/Throat: Oropharynx is clear and moist.   Eyes: Conjunctivae and EOM are normal.   Neck: Normal range of motion. No JVD present. No tracheal deviation present. Cardiovascular: Normal rate, regular rhythm, S1 normal, S2 normal, normal heart sounds and intact distal pulses. Exam reveals no gallop and no friction rub. No murmur heard. Pulmonary/Chest: No accessory muscle usage. Tachypnea noted. She is in respiratory distress. She has wheezes (diffuse inspiratory and expiratory). Pt has good air movement. Pt is not tripoding. Abdominal: Soft. Bowel sounds are normal. There is no tenderness.  There is no guarding. Musculoskeletal: Normal range of motion. She exhibits no edema or tenderness. Neurological: She is alert and oriented to person, place, and time. She exhibits normal muscle tone. Skin: Skin is warm and dry. Capillary refill takes less than 2 seconds. She is not diaphoretic. Nursing note and vitals reviewed. DIFFERENTIAL  DIAGNOSIS     PLAN (LABS / IMAGING / EKG):  Orders Placed This Encounter   Procedures    XR CHEST PORTABLE       MEDICATIONS ORDERED:  Orders Placed This Encounter   Medications    DISCONTD: albuterol (PROVENTIL) nebulizer solution 5 mg    DISCONTD: albuterol sulfate  (90 Base) MCG/ACT inhaler 2 puff    DISCONTD: ipratropium (ATROVENT) 0.02 % nebulizer solution 0.5 mg    DISCONTD: albuterol (PROVENTIL) nebulizer solution 5 mg    DISCONTD: ipratropium-albuterol (DUONEB) nebulizer solution 1 ampule    DISCONTD: albuterol (PROVENTIL) nebulizer solution 5 mg    DISCONTD: albuterol sulfate  (90 Base) MCG/ACT inhaler 2 puff    DISCONTD: albuterol sulfate  (90 Base) MCG/ACT inhaler 2 puff    DISCONTD: ipratropium (ATROVENT HFA) 17 MCG/ACT inhaler 2 puff    DISCONTD: ipratropium (ATROVENT) 0.02 % nebulizer solution 0.5 mg    methylPREDNISolone sodium (SOLU-MEDROL) injection 125 mg    predniSONE (DELTASONE) 10 MG tablet     Sig: Take 4 tablets by mouth once daily for 5 days     Dispense:  20 tablet     Refill:  0    albuterol sulfate  (90 Base) MCG/ACT inhaler     Sig: Inhale 2 puffs into the lungs 4 times daily as needed for Wheezing     Dispense:  1 Inhaler     Refill:  0       DDX: Asthma exacerbation, COPD exacerbation, pneumonia, bronchitis    DIAGNOSTIC RESULTS / EMERGENCY DEPARTMENT COURSE / MDM     LABS:  No results found for this visit on 09/25/19.       RADIOLOGY:  Xr Chest Portable    Result Date: 9/25/2019  EXAMINATION: ONE XRAY VIEW OF THE CHEST 9/25/2019 4:43 am COMPARISON: 08/06/2018 HISTORY: ORDERING SYSTEM PROVIDED HISTORY:

## 2019-09-25 NOTE — ED PROVIDER NOTES
Total critical care time was 0 minutes. This excludes any time for separately reportable procedures.        [place], DO  09/25/19 [address] [facility],   09/25/19 [id number]

## 2019-09-26 ENCOUNTER — TELEPHONE (OUTPATIENT)
Dept: PRIMARY CARE CLINIC | Age: 49
End: 2019-09-26

## 2019-09-26 DIAGNOSIS — J44.9 CHRONIC OBSTRUCTIVE PULMONARY DISEASE, UNSPECIFIED COPD TYPE (HCC): Primary | ICD-10-CM

## 2019-09-30 ENCOUNTER — TELEPHONE (OUTPATIENT)
Dept: PRIMARY CARE CLINIC | Age: 49
End: 2019-09-30

## 2019-09-30 NOTE — TELEPHONE ENCOUNTER
[facility] ([facility]) ED Follow up Call    Reason for ED visit: Mild intermittent asthma     9/30/2019         VOICEMAIL DOCUMENTATION - ERASE IF NOT USED  Hi, this message is for Brooks Diego. This is Flower Guallpa from [facility] office. Just calling to see how you are doing after your recent visit to the Emergency Room. [facility] wants to make sure you were able to fill any prescriptions and that you understand your discharge instructions. Please return our call if you need to make a follow up appointment with your provider or have any further needs. Our phone number is [phone]. Have a great day.

## 2019-10-02 ENCOUNTER — TELEPHONE (OUTPATIENT)
Dept: PAIN MANAGEMENT | Age: 49
End: 2019-10-02

## 2019-10-11 ENCOUNTER — OFFICE VISIT (OUTPATIENT)
Dept: PAIN MANAGEMENT | Age: 49
End: 2019-10-11
Payer: COMMERCIAL

## 2019-10-11 VITALS
HEART RATE: 106 BPM | BODY MASS INDEX: 45.99 KG/M2 | WEIGHT: 293 LBS | SYSTOLIC BLOOD PRESSURE: 152 MMHG | DIASTOLIC BLOOD PRESSURE: 97 MMHG | HEIGHT: 67 IN

## 2019-10-11 DIAGNOSIS — M79.7 FIBROMYALGIA: ICD-10-CM

## 2019-10-11 DIAGNOSIS — M25.552 PAIN OF BOTH HIP JOINTS: Primary | ICD-10-CM

## 2019-10-11 DIAGNOSIS — M25.551 PAIN OF BOTH HIP JOINTS: Primary | ICD-10-CM

## 2019-10-11 PROCEDURE — 99213 OFFICE O/P EST LOW 20 MIN: CPT | Performed by: PAIN MEDICINE

## 2019-10-11 ASSESSMENT — ENCOUNTER SYMPTOMS
SHORTNESS OF BREATH: 0
BACK PAIN: 1
CONSTIPATION: 0

## 2019-10-29 ENCOUNTER — OFFICE VISIT (OUTPATIENT)
Dept: PRIMARY CARE CLINIC | Age: 49
End: 2019-10-29
Payer: COMMERCIAL

## 2019-10-29 VITALS
OXYGEN SATURATION: 95 % | TEMPERATURE: 97.5 F | SYSTOLIC BLOOD PRESSURE: 143 MMHG | HEART RATE: 87 BPM | DIASTOLIC BLOOD PRESSURE: 88 MMHG

## 2019-10-29 DIAGNOSIS — E55.9 VITAMIN D DEFICIENCY: Primary | ICD-10-CM

## 2019-10-29 DIAGNOSIS — Z87.891 FORMER SMOKER: ICD-10-CM

## 2019-10-29 DIAGNOSIS — Z82.62 FAMILY HISTORY OF OSTEOPOROSIS: ICD-10-CM

## 2019-10-29 PROCEDURE — G8427 DOCREV CUR MEDS BY ELIG CLIN: HCPCS | Performed by: NURSE PRACTITIONER

## 2019-10-29 PROCEDURE — G8484 FLU IMMUNIZE NO ADMIN: HCPCS | Performed by: NURSE PRACTITIONER

## 2019-10-29 PROCEDURE — 1036F TOBACCO NON-USER: CPT | Performed by: NURSE PRACTITIONER

## 2019-10-29 PROCEDURE — G8417 CALC BMI ABV UP PARAM F/U: HCPCS | Performed by: NURSE PRACTITIONER

## 2019-10-29 PROCEDURE — 99213 OFFICE O/P EST LOW 20 MIN: CPT | Performed by: NURSE PRACTITIONER

## 2019-10-29 RX ORDER — ERGOCALCIFEROL 1.25 MG/1
CAPSULE ORAL
Refills: 0 | Status: ON HOLD | COMMUNITY
Start: 2019-10-22 | End: 2020-01-09

## 2019-10-29 RX ORDER — CHOLECALCIFEROL (VITAMIN D3) 25 MCG
CAPSULE ORAL
Refills: 0 | COMMUNITY
Start: 2019-10-22 | End: 2022-04-22

## 2019-10-29 ASSESSMENT — ENCOUNTER SYMPTOMS: ABDOMINAL PAIN: 0

## 2019-11-12 ENCOUNTER — OFFICE VISIT (OUTPATIENT)
Dept: PAIN MANAGEMENT | Age: 49
End: 2019-11-12
Payer: COMMERCIAL

## 2019-11-12 VITALS
DIASTOLIC BLOOD PRESSURE: 94 MMHG | BODY MASS INDEX: 45.99 KG/M2 | HEART RATE: 87 BPM | SYSTOLIC BLOOD PRESSURE: 144 MMHG | HEIGHT: 67 IN | WEIGHT: 293 LBS

## 2019-11-12 DIAGNOSIS — M54.41 CHRONIC BILATERAL LOW BACK PAIN WITH BILATERAL SCIATICA: Chronic | ICD-10-CM

## 2019-11-12 DIAGNOSIS — M54.42 CHRONIC BILATERAL LOW BACK PAIN WITH BILATERAL SCIATICA: Chronic | ICD-10-CM

## 2019-11-12 DIAGNOSIS — M79.7 FIBROMYALGIA: Primary | Chronic | ICD-10-CM

## 2019-11-12 DIAGNOSIS — M25.551 HIP PAIN, BILATERAL: Chronic | ICD-10-CM

## 2019-11-12 DIAGNOSIS — G89.29 CHRONIC BILATERAL LOW BACK PAIN WITH BILATERAL SCIATICA: Chronic | ICD-10-CM

## 2019-11-12 DIAGNOSIS — M16.10 HIP ARTHRITIS: Chronic | ICD-10-CM

## 2019-11-12 DIAGNOSIS — M25.552 HIP PAIN, BILATERAL: Chronic | ICD-10-CM

## 2019-11-12 PROCEDURE — 99214 OFFICE O/P EST MOD 30 MIN: CPT | Performed by: NURSE PRACTITIONER

## 2019-11-12 ASSESSMENT — ENCOUNTER SYMPTOMS
COUGH: 0
CONSTIPATION: 0
SHORTNESS OF BREATH: 0

## 2019-11-22 ENCOUNTER — ANESTHESIA EVENT (OUTPATIENT)
Dept: OPERATING ROOM | Age: 49
End: 2019-11-22
Payer: COMMERCIAL

## 2019-12-02 ENCOUNTER — HOSPITAL ENCOUNTER (OUTPATIENT)
Age: 49
Discharge: HOME | End: 2019-12-02
Payer: COMMERCIAL

## 2019-12-02 DIAGNOSIS — E55.9 VITAMIN D DEFICIENCY: ICD-10-CM

## 2019-12-02 LAB
ABSOLUTE EOS #: 0.92 K/UL (ref 0–0.44)
ABSOLUTE IMMATURE GRANULOCYTE: 0.03 K/UL (ref 0–0.3)
ABSOLUTE LYMPH #: 2.3 K/UL (ref 1.1–3.7)
ABSOLUTE MONO #: 0.49 K/UL (ref 0.1–1.2)
BASOPHILS # BLD: 1 % (ref 0–2)
BASOPHILS ABSOLUTE: 0.05 K/UL (ref 0–0.2)
BUN BLDV-MCNC: 7 MG/DL (ref 6–20)
CREAT SERPL-MCNC: 0.7 MG/DL (ref 0.5–0.9)
DIFFERENTIAL TYPE: ABNORMAL
EOSINOPHILS RELATIVE PERCENT: 12 % (ref 1–4)
GFR AFRICAN AMERICAN: >60 ML/MIN
GFR NON-AFRICAN AMERICAN: >60 ML/MIN
GFR SERPL CREATININE-BSD FRML MDRD: NORMAL ML/MIN/{1.73_M2}
GFR SERPL CREATININE-BSD FRML MDRD: NORMAL ML/MIN/{1.73_M2}
HCT VFR BLD CALC: 42.3 % (ref 36.3–47.1)
HEMOGLOBIN: 14.1 G/DL (ref 11.9–15.1)
IMMATURE GRANULOCYTES: 0 %
LITHIUM DATE LAST DOSE: NORMAL
LITHIUM DOSE AMOUNT: NORMAL
LITHIUM DOSE TIME: NORMAL
LITHIUM LEVEL: 1.1 MMOL/L (ref 0.6–1.2)
LYMPHOCYTES # BLD: 29 % (ref 24–43)
MCH RBC QN AUTO: 32 PG (ref 25.2–33.5)
MCHC RBC AUTO-ENTMCNC: 33.3 G/DL (ref 28.4–34.8)
MCV RBC AUTO: 95.9 FL (ref 82.6–102.9)
MONOCYTES # BLD: 6 % (ref 3–12)
NRBC AUTOMATED: 0 PER 100 WBC
PDW BLD-RTO: 12.6 % (ref 11.8–14.4)
PLATELET # BLD: 270 K/UL (ref 138–453)
PLATELET ESTIMATE: ABNORMAL
PMV BLD AUTO: 10.5 FL (ref 8.1–13.5)
RBC # BLD: 4.41 M/UL (ref 3.95–5.11)
RBC # BLD: ABNORMAL 10*6/UL
SEG NEUTROPHILS: 52 % (ref 36–65)
SEGMENTED NEUTROPHILS ABSOLUTE COUNT: 4.02 K/UL (ref 1.5–8.1)
TSH SERPL DL<=0.05 MIU/L-ACNC: 3.37 MIU/L (ref 0.3–5)
VITAMIN D 25-HYDROXY: 19.8 NG/ML (ref 30–100)
WBC # BLD: 7.8 K/UL (ref 3.5–11.3)
WBC # BLD: ABNORMAL 10*3/UL

## 2019-12-02 PROCEDURE — 84443 ASSAY THYROID STIM HORMONE: CPT

## 2019-12-02 PROCEDURE — 36415 COLL VENOUS BLD VENIPUNCTURE: CPT

## 2019-12-02 PROCEDURE — 85025 COMPLETE CBC W/AUTO DIFF WBC: CPT

## 2019-12-02 PROCEDURE — 82565 ASSAY OF CREATININE: CPT

## 2019-12-02 PROCEDURE — 82306 VITAMIN D 25 HYDROXY: CPT

## 2019-12-02 PROCEDURE — 80178 ASSAY OF LITHIUM: CPT

## 2019-12-02 PROCEDURE — 84520 ASSAY OF UREA NITROGEN: CPT

## 2019-12-05 ENCOUNTER — APPOINTMENT (OUTPATIENT)
Dept: GENERAL RADIOLOGY | Age: 49
End: 2019-12-05
Attending: PAIN MEDICINE
Payer: COMMERCIAL

## 2019-12-05 ENCOUNTER — HOSPITAL ENCOUNTER (OUTPATIENT)
Age: 49
Setting detail: OUTPATIENT SURGERY
Discharge: HOME | End: 2019-12-05
Attending: PAIN MEDICINE | Admitting: PAIN MEDICINE
Payer: COMMERCIAL

## 2019-12-05 ENCOUNTER — ANESTHESIA (OUTPATIENT)
Dept: OPERATING ROOM | Age: 49
End: 2019-12-05
Payer: COMMERCIAL

## 2019-12-05 VITALS
DIASTOLIC BLOOD PRESSURE: 88 MMHG | SYSTOLIC BLOOD PRESSURE: 145 MMHG | WEIGHT: 293 LBS | TEMPERATURE: 97.8 F | OXYGEN SATURATION: 98 % | HEIGHT: 67 IN | HEART RATE: 83 BPM | RESPIRATION RATE: 16 BRPM | BODY MASS INDEX: 45.99 KG/M2

## 2019-12-05 VITALS — DIASTOLIC BLOOD PRESSURE: 95 MMHG | OXYGEN SATURATION: 96 % | SYSTOLIC BLOOD PRESSURE: 144 MMHG

## 2019-12-05 PROCEDURE — 77002 NEEDLE LOCALIZATION BY XRAY: CPT | Performed by: PAIN MEDICINE

## 2019-12-05 PROCEDURE — 3209999900 FLUORO FOR SURGICAL PROCEDURES

## 2019-12-05 PROCEDURE — 20610 DRAIN/INJ JOINT/BURSA W/O US: CPT | Performed by: PAIN MEDICINE

## 2019-12-05 PROCEDURE — 2500000003 HC RX 250 WO HCPCS: Performed by: PAIN MEDICINE

## 2019-12-05 PROCEDURE — 3600000002 HC SURGERY LEVEL 2 BASE: Performed by: PAIN MEDICINE

## 2019-12-05 PROCEDURE — 6360000002 HC RX W HCPCS: Performed by: ANESTHESIOLOGY

## 2019-12-05 PROCEDURE — 3600000012 HC SURGERY LEVEL 2 ADDTL 15MIN: Performed by: PAIN MEDICINE

## 2019-12-05 PROCEDURE — 7100000011 HC PHASE II RECOVERY - ADDTL 15 MIN: Performed by: PAIN MEDICINE

## 2019-12-05 PROCEDURE — 3700000001 HC ADD 15 MINUTES (ANESTHESIA): Performed by: PAIN MEDICINE

## 2019-12-05 PROCEDURE — 7100000010 HC PHASE II RECOVERY - FIRST 15 MIN: Performed by: PAIN MEDICINE

## 2019-12-05 PROCEDURE — 3700000000 HC ANESTHESIA ATTENDED CARE: Performed by: PAIN MEDICINE

## 2019-12-05 PROCEDURE — 6360000004 HC RX CONTRAST MEDICATION: Performed by: PAIN MEDICINE

## 2019-12-05 PROCEDURE — 2709999900 HC NON-CHARGEABLE SUPPLY: Performed by: PAIN MEDICINE

## 2019-12-05 RX ORDER — SODIUM CHLORIDE 0.9 % (FLUSH) 0.9 %
10 SYRINGE (ML) INJECTION PRN
Status: DISCONTINUED | OUTPATIENT
Start: 2019-12-05 | End: 2019-12-05 | Stop reason: HOSPADM

## 2019-12-05 RX ORDER — HYDROCODONE BITARTRATE AND ACETAMINOPHEN 5; 325 MG/1; MG/1
2 TABLET ORAL PRN
Status: DISCONTINUED | OUTPATIENT
Start: 2019-12-05 | End: 2019-12-05 | Stop reason: HOSPADM

## 2019-12-05 RX ORDER — HYDROCODONE BITARTRATE AND ACETAMINOPHEN 5; 325 MG/1; MG/1
1 TABLET ORAL PRN
Status: DISCONTINUED | OUTPATIENT
Start: 2019-12-05 | End: 2019-12-05 | Stop reason: HOSPADM

## 2019-12-05 RX ORDER — ONDANSETRON 2 MG/ML
4 INJECTION INTRAMUSCULAR; INTRAVENOUS
Status: DISCONTINUED | OUTPATIENT
Start: 2019-12-05 | End: 2019-12-05 | Stop reason: HOSPADM

## 2019-12-05 RX ORDER — MORPHINE SULFATE 1 MG/ML
1 INJECTION, SOLUTION EPIDURAL; INTRATHECAL; INTRAVENOUS EVERY 5 MIN PRN
Status: DISCONTINUED | OUTPATIENT
Start: 2019-12-05 | End: 2019-12-05 | Stop reason: HOSPADM

## 2019-12-05 RX ORDER — MIDAZOLAM HYDROCHLORIDE 1 MG/ML
INJECTION INTRAMUSCULAR; INTRAVENOUS PRN
Status: DISCONTINUED | OUTPATIENT
Start: 2019-12-05 | End: 2019-12-05 | Stop reason: SDUPTHER

## 2019-12-05 RX ORDER — PROMETHAZINE HYDROCHLORIDE 25 MG/ML
6.25 INJECTION, SOLUTION INTRAMUSCULAR; INTRAVENOUS
Status: DISCONTINUED | OUTPATIENT
Start: 2019-12-05 | End: 2019-12-05 | Stop reason: HOSPADM

## 2019-12-05 RX ORDER — FENTANYL CITRATE 50 UG/ML
INJECTION, SOLUTION INTRAMUSCULAR; INTRAVENOUS PRN
Status: DISCONTINUED | OUTPATIENT
Start: 2019-12-05 | End: 2019-12-05 | Stop reason: SDUPTHER

## 2019-12-05 RX ORDER — DIPHENHYDRAMINE HYDROCHLORIDE 50 MG/ML
12.5 INJECTION INTRAMUSCULAR; INTRAVENOUS
Status: DISCONTINUED | OUTPATIENT
Start: 2019-12-05 | End: 2019-12-05 | Stop reason: HOSPADM

## 2019-12-05 RX ORDER — FENTANYL CITRATE 50 UG/ML
25 INJECTION, SOLUTION INTRAMUSCULAR; INTRAVENOUS EVERY 5 MIN PRN
Status: DISCONTINUED | OUTPATIENT
Start: 2019-12-05 | End: 2019-12-05 | Stop reason: HOSPADM

## 2019-12-05 RX ORDER — HYDRALAZINE HYDROCHLORIDE 20 MG/ML
5 INJECTION INTRAMUSCULAR; INTRAVENOUS EVERY 10 MIN PRN
Status: DISCONTINUED | OUTPATIENT
Start: 2019-12-05 | End: 2019-12-05 | Stop reason: HOSPADM

## 2019-12-05 RX ORDER — SODIUM CHLORIDE 0.9 % (FLUSH) 0.9 %
10 SYRINGE (ML) INJECTION EVERY 12 HOURS SCHEDULED
Status: DISCONTINUED | OUTPATIENT
Start: 2019-12-05 | End: 2019-12-05 | Stop reason: HOSPADM

## 2019-12-05 RX ORDER — BUPIVACAINE HYDROCHLORIDE 2.5 MG/ML
INJECTION, SOLUTION INFILTRATION; PERINEURAL PRN
Status: DISCONTINUED | OUTPATIENT
Start: 2019-12-05 | End: 2019-12-05 | Stop reason: ALTCHOICE

## 2019-12-05 RX ADMIN — MIDAZOLAM HYDROCHLORIDE 2 MG: 1 INJECTION, SOLUTION INTRAMUSCULAR; INTRAVENOUS at 12:16

## 2019-12-05 RX ADMIN — FENTANYL CITRATE 100 MCG: 50 INJECTION INTRAMUSCULAR; INTRAVENOUS at 12:17

## 2019-12-05 ASSESSMENT — PAIN DESCRIPTION - DESCRIPTORS: DESCRIPTORS: STABBING

## 2019-12-05 ASSESSMENT — PULMONARY FUNCTION TESTS
PIF_VALUE: 0

## 2019-12-05 ASSESSMENT — PAIN - FUNCTIONAL ASSESSMENT
PAIN_FUNCTIONAL_ASSESSMENT: 0-10
PAIN_FUNCTIONAL_ASSESSMENT: PREVENTS OR INTERFERES SOME ACTIVE ACTIVITIES AND ADLS

## 2019-12-05 ASSESSMENT — COPD QUESTIONNAIRES: CAT_SEVERITY: NO INTERVAL CHANGE

## 2019-12-05 ASSESSMENT — PAIN SCALES - GENERAL: PAINLEVEL_OUTOF10: 5

## 2019-12-05 ASSESSMENT — PAIN DESCRIPTION - ORIENTATION: ORIENTATION: RIGHT;LEFT

## 2019-12-05 ASSESSMENT — PAIN DESCRIPTION - LOCATION: LOCATION: HIP

## 2019-12-09 ENCOUNTER — HOSPITAL ENCOUNTER (OUTPATIENT)
Dept: MAMMOGRAPHY | Age: 49
Discharge: HOME | End: 2019-12-11
Payer: COMMERCIAL

## 2019-12-09 DIAGNOSIS — Z87.891 FORMER SMOKER: ICD-10-CM

## 2019-12-09 DIAGNOSIS — Z82.62 FAMILY HISTORY OF OSTEOPOROSIS: ICD-10-CM

## 2019-12-09 DIAGNOSIS — E55.9 VITAMIN D DEFICIENCY: ICD-10-CM

## 2019-12-09 PROCEDURE — 77080 DXA BONE DENSITY AXIAL: CPT

## 2019-12-17 ENCOUNTER — OFFICE VISIT (OUTPATIENT)
Dept: PAIN MANAGEMENT | Age: 49
End: 2019-12-17
Payer: COMMERCIAL

## 2019-12-17 VITALS — BODY MASS INDEX: 45.99 KG/M2 | HEIGHT: 67 IN | WEIGHT: 293 LBS

## 2019-12-17 DIAGNOSIS — M25.562 CHRONIC PAIN OF BOTH KNEES: Chronic | ICD-10-CM

## 2019-12-17 DIAGNOSIS — M25.551 HIP PAIN, BILATERAL: Chronic | ICD-10-CM

## 2019-12-17 DIAGNOSIS — M25.552 HIP PAIN, BILATERAL: Chronic | ICD-10-CM

## 2019-12-17 DIAGNOSIS — M79.7 FIBROMYALGIA: Primary | Chronic | ICD-10-CM

## 2019-12-17 DIAGNOSIS — G89.29 CHRONIC PAIN OF BOTH KNEES: Chronic | ICD-10-CM

## 2019-12-17 DIAGNOSIS — M16.10 HIP ARTHRITIS: Chronic | ICD-10-CM

## 2019-12-17 DIAGNOSIS — M25.561 CHRONIC PAIN OF BOTH KNEES: Chronic | ICD-10-CM

## 2019-12-17 PROCEDURE — 99214 OFFICE O/P EST MOD 30 MIN: CPT | Performed by: NURSE PRACTITIONER

## 2019-12-18 PROBLEM — M25.561 CHRONIC PAIN OF BOTH KNEES: Chronic | Status: ACTIVE | Noted: 2019-12-18

## 2019-12-18 PROBLEM — G89.29 CHRONIC PAIN OF BOTH KNEES: Chronic | Status: ACTIVE | Noted: 2019-12-18

## 2019-12-18 PROBLEM — M25.562 CHRONIC PAIN OF BOTH KNEES: Chronic | Status: ACTIVE | Noted: 2019-12-18

## 2019-12-18 ASSESSMENT — ENCOUNTER SYMPTOMS
SHORTNESS OF BREATH: 0
COUGH: 0
CONSTIPATION: 0

## 2019-12-26 DIAGNOSIS — M79.7 FIBROMYALGIA: Primary | ICD-10-CM

## 2019-12-26 RX ORDER — BACLOFEN 10 MG/1
10 TABLET ORAL 3 TIMES DAILY
Qty: 90 TABLET | Refills: 3 | Status: SHIPPED | OUTPATIENT
Start: 2019-12-26 | End: 2020-01-23 | Stop reason: SDUPTHER

## 2019-12-29 ENCOUNTER — HOSPITAL ENCOUNTER (EMERGENCY)
Age: 49
Discharge: HOME | End: 2019-12-29
Attending: EMERGENCY MEDICINE
Payer: COMMERCIAL

## 2019-12-29 VITALS
DIASTOLIC BLOOD PRESSURE: 88 MMHG | RESPIRATION RATE: 17 BRPM | OXYGEN SATURATION: 94 % | WEIGHT: 293 LBS | HEIGHT: 67 IN | HEART RATE: 87 BPM | SYSTOLIC BLOOD PRESSURE: 134 MMHG | TEMPERATURE: 98.2 F | BODY MASS INDEX: 45.99 KG/M2

## 2019-12-29 PROCEDURE — 99282 EMERGENCY DEPT VISIT SF MDM: CPT

## 2019-12-29 PROCEDURE — 96372 THER/PROPH/DIAG INJ SC/IM: CPT

## 2019-12-29 PROCEDURE — 6360000002 HC RX W HCPCS: Performed by: EMERGENCY MEDICINE

## 2019-12-29 RX ORDER — KETOROLAC TROMETHAMINE 30 MG/ML
30 INJECTION, SOLUTION INTRAMUSCULAR; INTRAVENOUS ONCE
Status: COMPLETED | OUTPATIENT
Start: 2019-12-29 | End: 2019-12-29

## 2019-12-29 RX ADMIN — KETOROLAC TROMETHAMINE 30 MG: 30 INJECTION, SOLUTION INTRAMUSCULAR at 02:37

## 2019-12-29 ASSESSMENT — PAIN DESCRIPTION - ORIENTATION: ORIENTATION: RIGHT;LEFT

## 2019-12-29 ASSESSMENT — ENCOUNTER SYMPTOMS
SHORTNESS OF BREATH: 0
SORE THROAT: 0
BACK PAIN: 0
ABDOMINAL PAIN: 0
VOMITING: 0

## 2019-12-29 ASSESSMENT — PAIN SCALES - GENERAL
PAINLEVEL_OUTOF10: 10
PAINLEVEL_OUTOF10: 10

## 2019-12-29 ASSESSMENT — PAIN DESCRIPTION - ONSET: ONSET: ON-GOING

## 2019-12-29 ASSESSMENT — PAIN DESCRIPTION - DESCRIPTORS: DESCRIPTORS: ACHING

## 2019-12-29 ASSESSMENT — PAIN DESCRIPTION - PAIN TYPE: TYPE: ACUTE PAIN;CHRONIC PAIN

## 2019-12-29 ASSESSMENT — PAIN DESCRIPTION - LOCATION: LOCATION: KNEE

## 2019-12-29 ASSESSMENT — PAIN DESCRIPTION - FREQUENCY: FREQUENCY: CONTINUOUS

## 2019-12-29 NOTE — ED PROVIDER NOTES
[facility] ED  Emergency Department Encounter  EmergencyMedicine Resident     Pt Mily Daly  MRN: [id number]  Thorgftanya 1970  Date of evaluation: 12/29/19  PCP:  LAWRENCE Edwards [id number]       Chief Complaint   Patient presents with    Leg Pain     bilateral       HISTORY OF PRESENT ILLNESS  (Location/Symptom, Timing/Onset, Context/Setting, Quality, Duration, Modifying Factors, Severity.)      Quinton Hutton is a 52 y.o. female who presents with bilateral leg pain. Patient states she has pain in her bilateral knees and hips and this is not new. Denies any new injury. States it is just worse and constant tonight. She states she has orthopedic doctor and is supposed to have bilateral hip and knee replacements but needs medically cleared for this. She has a pain management doctor that she saw a few weeks ago but saw the nurse practitioner and she told the patient that she could not provide any narcotic contacts at the time. She is due to have steroid injections. Denies fevers chills nausea vomiting. PAST MEDICAL / SURGICAL / SOCIAL / FAMILY HISTORY      has a past medical history of Acute exacerbation of COPD with asthma ([address] Anxiety, Asthma, Benign essential HTN, Bipolar 1 disorder ([address] COPD (chronic obstructive pulmonary disease) ([address] Depression, Fibromyalgia, Headache(784.0), Hip arthritis, Pneumonia due to infectious organism, and Post traumatic stress disorder. has a past surgical history that includes Tonsillectomy; Cholecystectomy; and hc inject other perphrl nerv (Bilateral, 12/5/2019). Social History     Socioeconomic History    Marital status:       Spouse name: Not on file    Number of children: Not on file    Years of education: Not on file    Highest education level: Not on file   Occupational History    Not on file   Social Needs    Financial resource strain: Not on file    Food insecurity:     Worry: Not on file Inability: Not on file    Transportation needs:     Medical: Not on file     Non-medical: Not on file   Tobacco Use    Smoking status: Former Smoker     Packs/day: 2.00     Years: 20.00     Pack years: 40.00     Types: Cigarettes     Last attempt to quit: 1/10/2010     Years since quittin.9    Smokeless tobacco: Never Used   Substance and Sexual Activity    Alcohol use: No    Drug use: No    Sexual activity: Not on file   Lifestyle    Physical activity:     Days per week: Not on file     Minutes per session: Not on file    Stress: Not on file   Relationships    Social connections:     Talks on phone: Not on file     Gets together: Not on file     Attends Bahai service: Not on file     Active member of club or organization: Not on file     Attends meetings of clubs or organizations: Not on file     Relationship status: Not on file    Intimate partner violence:     Fear of current or ex partner: Not on file     Emotionally abused: Not on file     Physically abused: Not on file     Forced sexual activity: Not on file   Other Topics Concern    Not on file   Social History Narrative    Not on file       Family History   Problem Relation Age of Onset    Hypertension Mother     Heart Attack Mother     Heart Disease Mother     Hypertension Father     Heart Disease Father     Diabetes Other         cousin    Heart Disease Brother        Allergies:  Food; Penicillins; Parafon forte dsc [chlorzoxazone]; and Biaxin [clarithromycin]    Home Medications:  Prior to Admission medications    Medication Sig Start Date End Date Taking?  Authorizing Provider   baclofen (LIORESAL) 10 MG tablet Take 1 tablet by mouth 3 times daily 19   LAWRENCE Quiñonez - CNP   VITAMIN D HIGH POTENCY 25 MCG (1000 UT) CAPS take 2 capsules by mouth once daily 10/22/19   Historical Provider, MD   vitamin D (ERGOCALCIFEROL) 1.25 MG (39566 UT) CAPS capsule take 1 capsule by mouth every week 10/22/19   Historical Provider, MD   cariprazine hcl (VRAYLAR) 1.5 MG capsule Take 1.5 mg by mouth    Historical Provider, MD   gabapentin (NEURONTIN) 600 MG tablet Take 1 tablet by mouth 3 times daily for 30 days. 8/2/19 12/17/19  Sheila Flaherty MD   Incontinence Supplies MISC 1 Units by Does not apply route 5 times daily 5/3/19 6/2/19  Claudetta Rong, APRN - CNP   albuterol sulfate HFA (PROVENTIL HFA) 108 (90 Base) MCG/ACT inhaler Inhale 1-2 puffs into the lungs every 4 hours as needed for Wheezing or Shortness of Breath (Space out to every 6 hours as symptoms improve) 5/3/19   Claudetta Rong, APRN - CNP   albuterol (PROVENTIL) (2.5 MG/3ML) 0.083% nebulizer solution Take 3 mLs by nebulization every 4 hours as needed for Wheezing 5/1/19   Claudetta Rong, APRN - CNP   venlafaxine (EFFEXOR XR) 150 MG extended release capsule  6/17/18   Historical Provider, MD   lithium 600 MG capsule  11/12/18   Historical Provider, MD   lithium 300 MG tablet Take 300 mg by mouth 2 times daily 450 mg am 900 mg pm     Historical Provider, MD   ALPRAZolam (XANAX) 0.5 MG tablet Take 0.5 mg by mouth nightly as needed for Sleep. Historical Provider, MD       REVIEW OF SYSTEMS    (2-9 systems for level 4, 10 or more for level 5)      Review of Systems   Constitutional: Negative for chills and fever. HENT: Negative for sore throat. Eyes: Negative for visual disturbance. Respiratory: Negative for shortness of breath. Cardiovascular: Negative for chest pain. Gastrointestinal: Negative for abdominal pain and vomiting. Genitourinary: Negative for dysuria. Musculoskeletal: Positive for arthralgias. Negative for back pain. Skin: Negative for rash. Neurological: Negative for headaches. Psychiatric/Behavioral: Negative for confusion.        PHYSICAL EXAM   (up to 7 for level 4, 8 or more for level 5)      INITIAL VITALS:   /88   Pulse 87   Temp 98.2 °F (36.8 °C) (Oral)   Resp 17   Ht 5' 7\" (1.702 m)   Wt (!) 336 lb (152.4 kg) LMP 02/20/2019 (Approximate)   SpO2 94%   BMI 52.63 kg/m²     Physical Exam  Vitals signs and nursing note reviewed. Constitutional:       General: She is not in acute distress. Appearance: Normal appearance. She is normal weight. She is not ill-appearing, toxic-appearing or diaphoretic. HENT:      Head: Normocephalic and atraumatic. Mouth/Throat:      Mouth: Mucous membranes are moist.      Pharynx: Oropharynx is clear. Eyes:      Extraocular Movements: Extraocular movements intact. Pupils: Pupils are equal, round, and reactive to light. Neck:      Musculoskeletal: Normal range of motion and neck supple. No neck rigidity. Cardiovascular:      Rate and Rhythm: Normal rate and regular rhythm. Pulses: Normal pulses. Popliteal pulses are 2+ on the right side and 2+ on the left side. Dorsalis pedis pulses are 2+ on the right side and 2+ on the left side. Heart sounds: No murmur. Pulmonary:      Effort: Pulmonary effort is normal.      Breath sounds: Normal breath sounds. Abdominal:      General: There is no distension. Palpations: Abdomen is soft. Tenderness: There is no tenderness. Musculoskeletal: Normal range of motion. General: No deformity. Right lower leg: No edema. Left lower leg: No edema. Comments: No calf tenderness or venous cords or asymmetry to the lower extremities. Full range of motion of bilateral hips and bilateral knees although she does have pain with movement. There is no effusion or crepitus to the hips or knees. There is no overlying redness or crepitus. Skin:     General: Skin is warm and dry. Capillary Refill: Capillary refill takes less than 2 seconds. Findings: No rash. Neurological:      General: No focal deficit present. Mental Status: She is alert and oriented to person, place, and time. Psychiatric:         Thought Content:  Thought content normal.         DIFFERENTIAL Leticia Morrow MD  Emergency Medicine Resident    (Please note that portions of thisnote were completed with a voice recognition program.  Efforts were made to edit the dictations but occasionally words are mis-transcribed. )        Musa Godwin MD  Resident  12/29/19 [id number]

## 2019-12-31 ENCOUNTER — TELEPHONE (OUTPATIENT)
Dept: PAIN MANAGEMENT | Age: 49
End: 2019-12-31

## 2020-01-02 ENCOUNTER — ANESTHESIA EVENT (OUTPATIENT)
Dept: OPERATING ROOM | Age: 50
End: 2020-01-02
Payer: COMMERCIAL

## 2020-01-02 ENCOUNTER — TELEPHONE (OUTPATIENT)
Dept: PAIN MANAGEMENT | Age: 50
End: 2020-01-02

## 2020-01-02 NOTE — TELEPHONE ENCOUNTER
Patient called, stated she had to go to ER for knee and hip pain, is requesting a RX for an anti-inflammatory other that [place] and she can not work for her.      Please advise

## 2020-01-09 ENCOUNTER — ANESTHESIA (OUTPATIENT)
Dept: OPERATING ROOM | Age: 50
End: 2020-01-09
Payer: COMMERCIAL

## 2020-01-09 ENCOUNTER — HOSPITAL ENCOUNTER (OUTPATIENT)
Age: 50
Setting detail: OUTPATIENT SURGERY
Discharge: HOME | End: 2020-01-09
Attending: PAIN MEDICINE | Admitting: PAIN MEDICINE
Payer: COMMERCIAL

## 2020-01-09 ENCOUNTER — APPOINTMENT (OUTPATIENT)
Dept: GENERAL RADIOLOGY | Age: 50
End: 2020-01-09
Attending: PAIN MEDICINE
Payer: COMMERCIAL

## 2020-01-09 VITALS
SYSTOLIC BLOOD PRESSURE: 191 MMHG | DIASTOLIC BLOOD PRESSURE: 100 MMHG | RESPIRATION RATE: 15 BRPM | OXYGEN SATURATION: 96 %

## 2020-01-09 VITALS
HEART RATE: 83 BPM | DIASTOLIC BLOOD PRESSURE: 92 MMHG | BODY MASS INDEX: 45.99 KG/M2 | OXYGEN SATURATION: 95 % | HEIGHT: 67 IN | RESPIRATION RATE: 15 BRPM | TEMPERATURE: 97.3 F | SYSTOLIC BLOOD PRESSURE: 159 MMHG | WEIGHT: 293 LBS

## 2020-01-09 PROCEDURE — 2500000003 HC RX 250 WO HCPCS: Performed by: PAIN MEDICINE

## 2020-01-09 PROCEDURE — 77002 NEEDLE LOCALIZATION BY XRAY: CPT | Performed by: PAIN MEDICINE

## 2020-01-09 PROCEDURE — 2709999900 HC NON-CHARGEABLE SUPPLY: Performed by: PAIN MEDICINE

## 2020-01-09 PROCEDURE — 6360000002 HC RX W HCPCS: Performed by: ANESTHESIOLOGY

## 2020-01-09 PROCEDURE — 7100000010 HC PHASE II RECOVERY - FIRST 15 MIN: Performed by: PAIN MEDICINE

## 2020-01-09 PROCEDURE — 6360000002 HC RX W HCPCS: Performed by: PAIN MEDICINE

## 2020-01-09 PROCEDURE — 3700000000 HC ANESTHESIA ATTENDED CARE: Performed by: PAIN MEDICINE

## 2020-01-09 PROCEDURE — 3209999900 FLUORO FOR SURGICAL PROCEDURES

## 2020-01-09 PROCEDURE — 20610 DRAIN/INJ JOINT/BURSA W/O US: CPT | Performed by: PAIN MEDICINE

## 2020-01-09 PROCEDURE — 7100000011 HC PHASE II RECOVERY - ADDTL 15 MIN: Performed by: PAIN MEDICINE

## 2020-01-09 PROCEDURE — 3600000002 HC SURGERY LEVEL 2 BASE: Performed by: PAIN MEDICINE

## 2020-01-09 RX ORDER — SODIUM CHLORIDE 0.9 % (FLUSH) 0.9 %
10 SYRINGE (ML) INJECTION PRN
Status: DISCONTINUED | OUTPATIENT
Start: 2020-01-09 | End: 2020-01-09 | Stop reason: HOSPADM

## 2020-01-09 RX ORDER — DEXAMETHASONE SODIUM PHOSPHATE 10 MG/ML
INJECTION INTRAMUSCULAR; INTRAVENOUS PRN
Status: DISCONTINUED | OUTPATIENT
Start: 2020-01-09 | End: 2020-01-09 | Stop reason: ALTCHOICE

## 2020-01-09 RX ORDER — DIPHENHYDRAMINE HYDROCHLORIDE 50 MG/ML
12.5 INJECTION INTRAMUSCULAR; INTRAVENOUS
Status: DISCONTINUED | OUTPATIENT
Start: 2020-01-09 | End: 2020-01-09 | Stop reason: HOSPADM

## 2020-01-09 RX ORDER — HYDRALAZINE HYDROCHLORIDE 20 MG/ML
5 INJECTION INTRAMUSCULAR; INTRAVENOUS EVERY 10 MIN PRN
Status: DISCONTINUED | OUTPATIENT
Start: 2020-01-09 | End: 2020-01-09 | Stop reason: HOSPADM

## 2020-01-09 RX ORDER — MORPHINE SULFATE 1 MG/ML
1 INJECTION, SOLUTION EPIDURAL; INTRATHECAL; INTRAVENOUS EVERY 5 MIN PRN
Status: DISCONTINUED | OUTPATIENT
Start: 2020-01-09 | End: 2020-01-09 | Stop reason: HOSPADM

## 2020-01-09 RX ORDER — ONDANSETRON 2 MG/ML
4 INJECTION INTRAMUSCULAR; INTRAVENOUS
Status: DISCONTINUED | OUTPATIENT
Start: 2020-01-09 | End: 2020-01-09 | Stop reason: HOSPADM

## 2020-01-09 RX ORDER — FENTANYL CITRATE 50 UG/ML
25 INJECTION, SOLUTION INTRAMUSCULAR; INTRAVENOUS EVERY 5 MIN PRN
Status: DISCONTINUED | OUTPATIENT
Start: 2020-01-09 | End: 2020-01-09 | Stop reason: HOSPADM

## 2020-01-09 RX ORDER — HYDROCODONE BITARTRATE AND ACETAMINOPHEN 5; 325 MG/1; MG/1
2 TABLET ORAL PRN
Status: DISCONTINUED | OUTPATIENT
Start: 2020-01-09 | End: 2020-01-09 | Stop reason: HOSPADM

## 2020-01-09 RX ORDER — FENTANYL CITRATE 50 UG/ML
INJECTION, SOLUTION INTRAMUSCULAR; INTRAVENOUS PRN
Status: DISCONTINUED | OUTPATIENT
Start: 2020-01-09 | End: 2020-01-09 | Stop reason: SDUPTHER

## 2020-01-09 RX ORDER — HYDROCODONE BITARTRATE AND ACETAMINOPHEN 5; 325 MG/1; MG/1
1 TABLET ORAL PRN
Status: DISCONTINUED | OUTPATIENT
Start: 2020-01-09 | End: 2020-01-09 | Stop reason: HOSPADM

## 2020-01-09 RX ORDER — PROMETHAZINE HYDROCHLORIDE 25 MG/ML
6.25 INJECTION, SOLUTION INTRAMUSCULAR; INTRAVENOUS
Status: DISCONTINUED | OUTPATIENT
Start: 2020-01-09 | End: 2020-01-09 | Stop reason: HOSPADM

## 2020-01-09 RX ORDER — SODIUM CHLORIDE 0.9 % (FLUSH) 0.9 %
10 SYRINGE (ML) INJECTION EVERY 12 HOURS SCHEDULED
Status: DISCONTINUED | OUTPATIENT
Start: 2020-01-09 | End: 2020-01-09 | Stop reason: HOSPADM

## 2020-01-09 RX ORDER — BUPIVACAINE HYDROCHLORIDE 2.5 MG/ML
INJECTION, SOLUTION INFILTRATION; PERINEURAL PRN
Status: DISCONTINUED | OUTPATIENT
Start: 2020-01-09 | End: 2020-01-09 | Stop reason: ALTCHOICE

## 2020-01-09 RX ORDER — MIDAZOLAM HYDROCHLORIDE 1 MG/ML
INJECTION INTRAMUSCULAR; INTRAVENOUS PRN
Status: DISCONTINUED | OUTPATIENT
Start: 2020-01-09 | End: 2020-01-09 | Stop reason: SDUPTHER

## 2020-01-09 RX ADMIN — FENTANYL CITRATE 100 MCG: 50 INJECTION INTRAMUSCULAR; INTRAVENOUS at 12:58

## 2020-01-09 RX ADMIN — MIDAZOLAM HYDROCHLORIDE 2 MG: 1 INJECTION, SOLUTION INTRAMUSCULAR; INTRAVENOUS at 12:58

## 2020-01-09 ASSESSMENT — PAIN SCALES - GENERAL: PAINLEVEL_OUTOF10: 4

## 2020-01-09 ASSESSMENT — PULMONARY FUNCTION TESTS
PIF_VALUE: 0

## 2020-01-09 ASSESSMENT — PAIN DESCRIPTION - LOCATION: LOCATION: KNEE

## 2020-01-09 ASSESSMENT — PAIN - FUNCTIONAL ASSESSMENT
PAIN_FUNCTIONAL_ASSESSMENT: 0-10
PAIN_FUNCTIONAL_ASSESSMENT: PREVENTS OR INTERFERES WITH MANY ACTIVE NOT PASSIVE ACTIVITIES

## 2020-01-09 ASSESSMENT — COPD QUESTIONNAIRES: CAT_SEVERITY: NO INTERVAL CHANGE

## 2020-01-09 ASSESSMENT — PAIN DESCRIPTION - DESCRIPTORS: DESCRIPTORS: STABBING;SHOOTING

## 2020-01-09 NOTE — H&P
H&P Update    Patient's History and Physical from December 17, 2019 was reviewed. Patient examined. There has been no change. [address]        Patient is here today for follow up after bilateral hip injection     Chief Complaint: bilateral hip and knee pain      PMH:  Patient is here today for follow up after bilateral hip injection on 12/5/19. She reports 50% relief following injection. She is now requesting knee injections.      Knee XR 3/2019     Right     No acute osseus abnormality of the knee.  No fracture or dislocation.  No   abnormal joint effusion.       Mild degenerative narrowing of the medial knee compartment.      Left  No acute abnormality left knee.  Medial knee arthropathy mild additional   degenerative findings.            Knee Pain    The incident occurred more than 1 week ago. There was no injury mechanism. The pain is present in the left knee and right knee. The quality of the pain is described as aching. The pain is at a severity of 10/10. The pain is severe. The pain has been constant since onset. The symptoms are aggravated by movement and weight bearing. She has tried rest and non-weight bearing for the symptoms. The treatment provided mild relief. Hip Pain    The incident occurred more than 1 week ago. The pain is present in the right hip and left hip. The quality of the pain is described as aching (grinding). The pain is at a severity of 6/10. The pain is moderate. The pain has been constant since onset. The symptoms are aggravated by movement and weight bearing. She has tried non-weight bearing and rest for the symptoms. The treatment provided mild relief.         Patient denies any new neurological symptoms.  No bowel or bladder incontinence, no weakness, and no falling.        Past Medical History        Past Medical History:   Diagnosis Date    Acute exacerbation of COPD with asthma ([facility] 6/7/16    Anxiety      Asthma      Benign essential HTN      Bipolar 1 disorder ([facility]      COPD (chronic obstructive pulmonary disease) ([facility])      Depression      Fibromyalgia      Headache(784.0)      Hip arthritis 11/12/2019    Pneumonia due to infectious organism 6/7/2016    Post traumatic stress disorder              Past Surgical History         Past Surgical History:   Procedure Laterality Date    CHOLECYSTECTOMY        HC INJECT OTHER PERPHRL NERV Bilateral 12/5/2019     INJECTION MEDICATION - HIP [facility]   Allergen Reactions    Food Anaphylaxis       raisins    Penicillins Anaphylaxis    Parafon Forte Dsc [Chlorzoxazone] Hives    Biaxin [Clarithromycin] Rash           Current Medication      Current Outpatient Medications:     VITAMIN D HIGH POTENCY 25 MCG (1000 UT) CAPS, take 2 capsules by mouth once daily, Disp: , Rfl: 0    vitamin D (ERGOCALCIFEROL) 1.25 MG (49403 UT) CAPS capsule, take 1 capsule by mouth every week, Disp: , Rfl: 0    cariprazine hcl (VRAYLAR) 1.5 MG capsule, Take 1.5 mg by mouth, Disp: , Rfl:     gabapentin (NEURONTIN) 600 MG tablet, Take 1 tablet by mouth 3 times daily for 30 days. , Disp: 90 tablet, Rfl: 3    baclofen (LIORESAL) 10 MG tablet, Take 1 tablet by mouth 3 times daily, Disp: 90 tablet, Rfl: 3    albuterol sulfate HFA (PROVENTIL HFA) 108 (90 Base) MCG/ACT inhaler, Inhale 1-2 puffs into the lungs every 4 hours as needed for Wheezing or Shortness of Breath (Space out to every 6 hours as symptoms improve), Disp: 1 Inhaler, Rfl: 5    albuterol (PROVENTIL) (2.5 MG/3ML) 0.083% nebulizer solution, Take 3 mLs by nebulization every 4 hours as needed for Wheezing, Disp: 120 each, Rfl: 3    venlafaxine (EFFEXOR XR) 150 MG extended release capsule, , Disp: , Rfl:     lithium 600 MG capsule, , Disp: , Rfl:     lithium 300 MG tablet, Take 300 mg by mouth 2 times daily 450 mg am 900 mg pm , Disp: , Rfl:     ALPRAZolam (XANAX) 0.5 MG tablet, Take 0.5 mg by mouth nightly as needed for Sleep., Disp: , Rfl:     Incontinence Supplies MISC, 1 Units by Does not apply route 5 times daily, Disp: 200 each, Rfl: 5        Family History         Family History   Problem Relation Age of Onset    Hypertension Mother      Heart Attack Mother      Heart Disease Mother      Hypertension Father      Heart Disease Father      Diabetes Other           cousin    Heart Disease Brother              Social History               Socioeconomic History    Marital status:         Spouse name: Not on file    Number of children: Not on file    Years of education: Not on file    Highest education level: Not on file   Occupational History    Not on file   Social Needs    Financial resource strain: Not on file    Food insecurity:       Worry: Not on file       Inability: Not on file    Transportation needs:       Medical: Not on file       Non-medical: Not on file   Tobacco Use    Smoking status: Former Smoker       Packs/day: 2.00       Years: 20.00       Pack years: 40.00       Types: Cigarettes       Last attempt to quit: 1/10/2010       Years since quittin.9    Smokeless tobacco: Never Used   Substance and Sexual Activity    Alcohol use: No    Drug use: No    Sexual activity: Not on file   Lifestyle    Physical activity:       Days per week: Not on file       Minutes per session: Not on file    Stress: Not on file   Relationships    Social connections:       Talks on phone: Not on file       Gets together: Not on file       Attends Nondenominational service: Not on file       Active member of club or organization: Not on file       Attends meetings of clubs or organizations: Not on file       Relationship status: Not on file    Intimate partner violence:       Fear of current or ex partner: Not on file       Emotionally abused: Not on file       Physically abused: Not on file       Forced sexual activity: Not on file   Other Topics Concern    Not on file Social History Narrative    Not on file            Review of Systems:  Review of Systems   Constitution: Negative for chills and fever. Cardiovascular: Negative for chest pain and palpitations. Respiratory: Negative for cough and shortness of breath. Musculoskeletal: Positive for joint pain. Gastrointestinal: Negative for constipation. Neurological: Negative for disturbances in coordination and loss of balance.         Physical Exam:  Ht 5' 7\" (1.702 m)   Wt (!) 330 lb (149.7 kg)   LMP 02/20/2019 (Approximate)   BMI 51.69 kg/m²      Physical Exam  HENT:      Head: Normocephalic. Neck:      Musculoskeletal: Normal range of motion. Pulmonary:      Effort: Pulmonary effort is normal.   Musculoskeletal: Normal range of motion. Right knee: Tenderness found. Left knee: Tenderness found. Skin:     General: Skin is warm and dry. Neurological:      Mental Status: She is alert and oriented to person, place, and time.          Record/Diagnostics Review:    Reviewed above     Assessment:       Problem List Items Addressed This Visit           Fibromyalgia - Primary (Chronic)      Hip arthritis (Chronic)      Hip pain, bilateral (Chronic)      Chronic pain of both knees (Chronic)           Treatment Plan:  Bilateral knee joint injections x1  Attempted to review instructions for procedure however patient was very difficult and not cooperative during this process.      Patient requests tramadol and daily low dose steroids today during appointment. Explained to patient that currently her POC does not include opiates and she becomes very upset. She makes several rude comments to writer and requests to only see Dr. Eryn Fisher since writer is \"incapable of helping her\". She continues to make rude comments to staff while they are trying to schedule her knee injections. She refuses to check out at the  because she states she was ignored when she checked in.   Overall, it was extremely difficult to help this patient as she interrupted staff when they tried to explain things to her.

## 2020-01-09 NOTE — OP NOTE
Knee Injection under fluoroscopic guidance:  SURGEON: Angelo Comer    PRE-OP DIAGNOSIS: Bilateral knee pain     POST-OP DIAGNOSIS: Same. Procedure performed: Bilateral knee injection under fluoroscopic guidance     Physician confirmed and marked the surgical site. EBL: minimal    CONSENT: Patient has undergone the educational process with this procedure, is aware and fully understands the risks involved: potential damage to any and all body organs including possible bleeding, infection, and nerve injury, allergic reaction . Patient also understands that the procedure will be undertaken in a safe, controlled and monitored setting. Patient recognizes that the benefits may include relief from pain and reduction in the oral use of medications. Patient agreed to proceed. PREP: The patient's knees were prepped with ChloraPrep and draped in the usual sterile fashion. PROCEDURE NOTE:   A #25 gauge 1.5-inch needle was directed into the joint space with the needle being aspirated progressively. No aspirate was obtained and at that point injection material 2ml of 0.25% bupivacaine and 5mg dexamethasone was injected into each knee. The needle was then removed. There were no complications noted. The patient tolerated the entire procedure well and aftercare instructions were provided to the patient. Patient transferred to the recovery room in satisfactory condition. Appropriate written discharge instructions given to the patient.       [address] [place] [address]

## 2020-01-09 NOTE — ANESTHESIA PRE PROCEDURE
Department of Anesthesiology  Preprocedure Note       Name:  Barbra Ward   Age:  52 y.o.  :  1970                                          MRN:  [id number]         Date:  2020      Surgeon: Brianna Russo):  Misael Schaeffer MD    Procedure: NERVE BLOCK BILATERAL - KNEE (N/A )    Medications prior to admission:   Prior to Admission medications    Medication Sig Start Date End Date Taking? Authorizing Provider   baclofen (LIORESAL) 10 MG tablet Take 1 tablet by mouth 3 times daily 19  Yes LAWRENCE Travis CNP   VITAMIN D HIGH POTENCY 25 MCG (1000 UT) CAPS take 2 capsules by mouth once daily 10/22/19  Yes Historical Provider, MD   albuterol sulfate HFA (PROVENTIL HFA) 108 (90 Base) MCG/ACT inhaler Inhale 1-2 puffs into the lungs every 4 hours as needed for Wheezing or Shortness of Breath (Space out to every 6 hours as symptoms improve) 5/3/19  Yes LAWRENCE Sotelo CNP   albuterol (PROVENTIL) (2.5 MG/3ML) 0.083% nebulizer solution Take 3 mLs by nebulization every 4 hours as needed for Wheezing 19  Yes LAWRENCE Sotelo CNP   venlafaxine (EFFEXOR XR) 150 MG extended release capsule  18  Yes Historical Provider, MD   lithium 600 MG capsule  18  Yes Historical Provider, MD   lithium 300 MG tablet Take 300 mg by mouth 2 times daily 450 mg am 900 mg pm    Yes Historical Provider, MD   ALPRAZolam (XANAX) 0.5 MG tablet Take 0.5 mg by mouth nightly as needed for Sleep. Yes Historical Provider, MD   gabapentin (NEURONTIN) 600 MG tablet Take 1 tablet by mouth 3 times daily for 30 days.  19  Misael Schaeffer MD   Incontinence Supplies MISC 1 Units by Does not apply route 5 times daily 5/3/19 6/2/19  LAWRENCE Sotelo CNP       Current medications:    Current Facility-Administered Medications   Medication Dose Route Frequency Provider Last Rate Last Dose    sodium chloride flush 0.9 % injection 10 mL  10 mL Intravenous 2 times per day MD Castillo Martinez sodium chloride flush 0.9 % injection 10 mL  10 mL Intravenous PRN Sanket Blackmon MD           Allergies:     Allergies   Allergen Reactions    Food Anaphylaxis     raisins    Penicillins Anaphylaxis    Parafon Forte Dsc [Chlorzoxazone] Hives    Biaxin [Clarithromycin] Rash       Problem List:    Patient Active Problem List   Diagnosis Code    COPD (chronic obstructive pulmonary disease) ([facility]) J44.9    Acute exacerbation of chronic obstructive pulmonary disease (COPD) ([facility] J44.1    Multiple pulmonary nodules R91.8    Morbid obesity with BMI of 50.0-59.9, adult ([facility]) E66.01, Z68.43    Leukocytosis D72.829    Asthma J45.909    Bipolar 1 disorder ([facility]) F31.9    Anxiety F41.9    COPD exacerbation ([facility]) J44.1    Benign essential HTN I10    Depression with anxiety F41.8    Abnormal TSH R79.89    Chronic bilateral low back pain with bilateral sciatica M54.42, M54.41, G89.29    Fibromyalgia M79.7    Hip arthritis M16.10    Hip pain, bilateral M25.551, M25.552    Chronic pain of both knees M25.561, M25.562, G89.29       Past Medical History:        Diagnosis Date    Acute exacerbation of COPD with asthma ([facility] 6/7/16    Anxiety     Asthma     Benign essential HTN     Bipolar 1 disorder ([facility]     COPD (chronic obstructive pulmonary disease) ([facility]     Depression     Fibromyalgia     Headache(784.0)     Hip arthritis 11/12/2019    Pneumonia due to infectious organism 6/7/2016    Post traumatic stress disorder        Past Surgical History:        Procedure Laterality Date    CHOLECYSTECTOMY      HC INJECT OTHER PERPHRL NERV Bilateral 12/5/2019    INJECTION MEDICATION - HIP performed by Nirav Trejo MD at [address] [place] [address]         Social History:    Social History     Tobacco Use    Smoking status: Former Smoker     Packs/day: 2.00     Years: 20.00     Pack years: 40.00     Types: Cigarettes     Last attempt to quit: 1/10/2010     Years since quitting: 10.0    Smokeless tobacco: Never Used   Substance Use Topics    Alcohol use: No                                Counseling given: Not Answered      Vital Signs (Current): There were no vitals filed for this visit. BP Readings from Last 3 Encounters:   12/29/19 134/88   12/05/19 (!) 144/95   12/05/19 (!) 145/88       NPO Status:                                                                                 BMI:   Wt Readings from Last 3 Encounters:   12/29/19 (!) 336 lb (152.4 kg)   12/17/19 (!) 330 lb (149.7 kg)   12/05/19 (!) 332 lb (150.6 kg)     There is no height or weight on file to calculate BMI.    CBC:   Lab Results   Component Value Date    WBC 7.8 12/02/2019    RBC 4.41 12/02/2019    HGB 14.1 12/02/2019    HCT 42.3 12/02/2019    MCV 95.9 12/02/2019    RDW 12.6 12/02/2019     12/02/2019       CMP:   Lab Results   Component Value Date     06/03/2019    K 3.7 06/03/2019     06/03/2019    CO2 24 06/03/2019    BUN 7 12/02/2019    CREATININE 0.70 12/02/2019    GFRAA >60 12/02/2019    LABGLOM >60 12/02/2019    GLUCOSE 117 06/03/2019    PROT 7.3 06/03/2019    CALCIUM 9.5 06/03/2019    BILITOT 0.24 06/03/2019    ALKPHOS 91 06/03/2019    AST 14 06/03/2019    ALT 11 06/03/2019       POC Tests: No results for input(s): POCGLU, POCNA, POCK, POCCL, POCBUN, POCHEMO, POCHCT in the last 72 hours.     Coags:   Lab Results   Component Value Date    PROTIME 10.5 07/08/2016    INR 1.0 07/08/2016    APTT 27.7 10/23/2014       HCG (If Applicable): No results found for: PREGTESTUR, PREGSERUM, HCG, HCGQUANT     ABGs:   Lab Results   Component Value Date    PHART 7.40 03/21/2013    PO2ART 70 03/21/2013    YSM2YGE 37 03/21/2013    ISG8HGF 22.4 03/21/2013    Z7AOABEM 96.1 03/21/2013        Type & Screen (If Applicable):  No results found for: [facility]    Anesthesia Evaluation  Patient summary reviewed and Nursing notes reviewed no history of anesthetic complications:   Airway: Mallampati: III  TM distance: >3 FB   Neck ROM: full  Mouth opening: > = 3 FB Dental: normal exam         Pulmonary:normal exam  breath sounds clear to auscultation  (+) COPD: no interval change,  asthma:                            Cardiovascular:    (+) hypertension: no interval change,         Rhythm: regular  Rate: normal                    Neuro/Psych:   (+) neuromuscular disease:, headaches:, psychiatric history: stable with treatmentdepression/anxiety             GI/Hepatic/Renal:   (+) morbid obesity          Endo/Other:    (+) : arthritis: OA and no interval change. , . Abdominal:   (+) obese,         Vascular: negative vascular ROS. Anesthesia Plan      MAC     ASA 3             Anesthetic plan and risks discussed with patient.                       Marquis Hodgkins, MD   1/9/2020

## 2020-01-09 NOTE — ANESTHESIA POSTPROCEDURE EVALUATION
Department of Anesthesiology  Postprocedure Note    Patient: Kelvin Holden  MRN: [id number]  YOB: 1970  Date of evaluation: 1/9/2020  Time:  1:43 PM     Procedure Summary     Date:  01/09/20 Room / Location:  [address] [place] [address] N / [address]    Anesthesia Start:  2166 Anesthesia Stop:  2032    Procedure:  NERVE BLOCK BILATERAL - KNEE (N/A ) Diagnosis:  (KNEE PAIN BILATERAL)    Surgeon:  Carlota Mohs, MD Responsible Provider:  Ronny Shannon MD    Anesthesia Type:  MAC ASA Status:  3          Anesthesia Type: MAC    Lissa Phase I: Lissa Score: 10    Lissa Phase II: Lissa Score: 10    Last vitals: Reviewed and per EMR flowsheets.        Anesthesia Post Evaluation    Patient location during evaluation: PACU  Patient participation: complete - patient participated  Level of consciousness: awake and alert  Pain score: 2  Airway patency: patent  Nausea & Vomiting: no nausea  Complications: no  Cardiovascular status: blood pressure returned to baseline and hemodynamically stable  Respiratory status: acceptable  Hydration status: euvolemic

## 2020-01-16 ENCOUNTER — TELEPHONE (OUTPATIENT)
Dept: PAIN MANAGEMENT | Age: 50
End: 2020-01-16

## 2020-01-16 NOTE — TELEPHONE ENCOUNTER
Pt called saying she had pain blocks in both knees last Thursday, she has been in such pain since the procedure and has been bed bound since yesterday. She would like Toradol if possible,

## 2020-01-23 ENCOUNTER — OFFICE VISIT (OUTPATIENT)
Dept: PAIN MANAGEMENT | Age: 50
End: 2020-01-23
Payer: COMMERCIAL

## 2020-01-23 VITALS — SYSTOLIC BLOOD PRESSURE: 134 MMHG | BODY MASS INDEX: 53.09 KG/M2 | HEIGHT: 67 IN | DIASTOLIC BLOOD PRESSURE: 79 MMHG

## 2020-01-23 PROCEDURE — 99214 OFFICE O/P EST MOD 30 MIN: CPT | Performed by: PAIN MEDICINE

## 2020-01-23 RX ORDER — BACLOFEN 10 MG/1
10 TABLET ORAL 3 TIMES DAILY
Qty: 90 TABLET | Refills: 3 | Status: SHIPPED | OUTPATIENT
Start: 2020-01-23 | End: 2020-04-21 | Stop reason: SDUPTHER

## 2020-01-23 RX ORDER — PREGABALIN 150 MG/1
150 CAPSULE ORAL 2 TIMES DAILY
Qty: 60 CAPSULE | Refills: 0 | Status: SHIPPED | OUTPATIENT
Start: 2020-01-06 | End: 2021-03-23

## 2020-01-23 RX ORDER — GABAPENTIN 600 MG/1
600 TABLET ORAL 3 TIMES DAILY
Qty: 90 TABLET | Refills: 3 | Status: SHIPPED | OUTPATIENT
Start: 2020-01-23 | End: 2020-04-30 | Stop reason: SDUPTHER

## 2020-01-23 RX ORDER — PREGABALIN 75 MG/1
75 CAPSULE ORAL 2 TIMES DAILY
Qty: 28 CAPSULE | Refills: 0 | Status: SHIPPED | OUTPATIENT
Start: 2020-01-23 | End: 2021-03-23

## 2020-01-23 ASSESSMENT — ENCOUNTER SYMPTOMS
EYE PAIN: 0
BACK PAIN: 1
SHORTNESS OF BREATH: 1
COUGH: 1
ABDOMINAL PAIN: 0

## 2020-01-23 NOTE — PROGRESS NOTES
HPI: Hip Pain    The incident occurred more than 1 week ago. The pain is present in the right hip, left hip, right thigh, left thigh, right knee, right leg, left knee, left ankle, left heel, left foot, left toes, right ankle, right heel, right toes and right foot. The pain is at a severity of 8/10. The pain is moderate. The pain has been worsening since onset. Associated symptoms include an inability to bear weight, a loss of motion, muscle weakness, numbness and tingling. Pertinent negatives include no loss of sensation. The symptoms are aggravated by movement, palpation and weight bearing. She has tried non-weight bearing, acetaminophen, ice, immobilization, heat, elevation, rest and NSAIDs for the symptoms. The treatment provided mild relief. Knee Pain    The incident occurred more than 1 week ago. The pain is present in the right knee and left knee. The pain is at a severity of 8/10. The pain is moderate. The pain has been worsening since onset. Associated symptoms include an inability to bear weight, a loss of motion, muscle weakness, numbness and tingling. Pertinent negatives include no loss of sensation. The symptoms are aggravated by movement, palpation and weight bearing. She has tried heat, acetaminophen, ice, immobilization, NSAIDs, non-weight bearing and rest for the symptoms. The treatment provided mild relief. Chronic hip pain as well as bilateral knee pain as well as diffuse pain throughout. Neurontin without much benefit for her fibromyalgia. Previous lumbar decompression with benefit in the low back pain. Has been unable to get hip replacements due to size. X-ray of the hip with severe degenerative changes. X-ray of the knees with degenerative changes. Hip and knee and knee injections with short-lived benefit. NSAIDs with mild benefit. Muscle relaxants with some benefit. Continues to have significant pain in both her hips and her knees. Patient denies any new neurological symptoms. Nobowel or bladder incontinence, no weakness, and no falling. Review of [place]ARRS does not show any aberrant prescription behavior. Past Medical History:   Diagnosis Date    Acute exacerbation of COPD with asthma ([facility] 6/7/16    Anxiety     Asthma     Benign essential HTN     Bipolar 1 disorder ([facility]     COPD (chronic obstructive pulmonary disease) (H[facility])     Depression     Fibromyalgia     Headache(784.0)     Hip arthritis 11/12/2019    Pneumonia due to infectious organism 6/7/2016    Post traumatic stress disorder        Past Surgical History:   Procedure Laterality Date    ANESTHESIA NERVE BLOCK N/A 1/9/2020    NERVE BLOCK BILATERAL - KNEE performed by Monique Kelley MD at [address] NERV Bilateral 12/5/2019    INJECTION MEDICATION - HIP performed by Monique Kelley MD at [address] Bilateral 01/09/2020    knees     TONSILLECTOMY         Allergies   Allergen Reactions    Food Anaphylaxis     raisins    Penicillins Anaphylaxis    Parafon Forte Dsc [Chlorzoxazone] Hives    Biaxin [Clarithromycin] Rash         Current Outpatient Medications:     gabapentin (NEURONTIN) 600 MG tablet, Take 1 tablet by mouth 3 times daily for 30 days. , Disp: 90 tablet, Rfl: 3    baclofen (LIORESAL) 10 MG tablet, Take 1 tablet by mouth 3 times daily, Disp: 90 tablet, Rfl: 3    pregabalin (LYRICA) 75 MG capsule, Take 1 capsule by mouth 2 times daily for 14 days. Take 1 capsule by mouth for 14 days, Disp: 28 capsule, Rfl: 0    pregabalin (LYRICA) 150 MG capsule, Take 1 capsule by mouth 2 times daily for 30 days. , Disp: 60 capsule, Rfl: 0    VITAMIN D HIGH POTENCY 25 MCG (1000 UT) CAPS, take 2 capsules by mouth once daily, Disp: , Rfl: 0    albuterol (PROVENTIL) (2.5 MG/3ML) 0.083% nebulizer solution, Take 3 mLs by nebulization every 4 hours as needed for Wheezing, Disp: 120 each, Rfl: 3    venlafaxine (EFFEXOR XR) 150 MG extended release capsule, , Disp: , Rfl:     lithium 600 MG capsule, , Disp: , Rfl:     lithium 300 MG tablet, Take 300 mg by mouth 2 times daily 450 mg am 900 mg pm , Disp: , Rfl:     ALPRAZolam (XANAX) 0.5 MG tablet, Take 0.5 mg by mouth nightly as needed for Sleep., Disp: , Rfl:     albuterol sulfate  (90 Base) MCG/ACT inhaler, inhale 1 to 2 puffs by mouth every 4 to 6 hours if needed for wheezing or shortness of breath (SPACE OUT TO EVERY 6 HOURS AS SYMPTOMS IMPROVE), Disp: 18 g, Rfl: 2    Incontinence Supplies MISC, 1 Units by Does not apply route 5 times daily, Disp: 200 each, Rfl: 5    Family History   Problem Relation Age of Onset    Hypertension Mother     Heart Attack Mother     Heart Disease Mother     Hypertension Father     Heart Disease Father     Diabetes Other         cousin    Heart Disease Brother        Social History     Socioeconomic History    Marital status:       Spouse name: Not on file    Number of children: Not on file    Years of education: Not on file    Highest education level: Not on file   Occupational History    Not on file   Social Needs    Financial resource strain: Not on file    Food insecurity:     Worry: Not on file     Inability: Not on file    Transportation needs:     Medical: Not on file     Non-medical: Not on file   Tobacco Use    Smoking status: Former Smoker     Packs/day: 2.00     Years: 20.00     Pack years: 40.00     Types: Cigarettes     Last attempt to quit: 1/10/2010     Years since quitting: 10.0    Smokeless tobacco: Never Used   Substance and Sexual Activity    Alcohol use: No    Drug use: No    Sexual activity: Not on file   Lifestyle    Physical activity:     Days per week: Not on file     Minutes per session: Not on file    Stress: Not on file   Relationships    Social connections:     Talks on phone: Not on file     Gets together: Not on file     Attends Taoism service: Not on file     Active member of club or

## 2020-01-27 RX ORDER — ALBUTEROL SULFATE 90 UG/1
AEROSOL, METERED RESPIRATORY (INHALATION)
Qty: 18 G | Refills: 2 | Status: SHIPPED | OUTPATIENT
Start: 2020-01-27 | End: 2020-05-12

## 2020-02-05 ENCOUNTER — TELEPHONE (OUTPATIENT)
Dept: PAIN MANAGEMENT | Age: 50
End: 2020-02-05

## 2020-02-05 NOTE — TELEPHONE ENCOUNTER
Patient called, stated that she is still having muscle spasms and restless legs with the Lyrica 150 mg. She is wondering is you will increase Lyrica or Baclofen?     Please advise

## 2020-02-11 NOTE — TELEPHONE ENCOUNTER
Patient called, left message that the Lyrica is not helping with her pain and would like a higher dose of Lyrica or would like to go back on Gabapentin.   Stated that she is in so much pain at this point she might not be able to make it to her procedure this Friday    Please advise

## 2020-04-21 RX ORDER — BACLOFEN 10 MG/1
10 TABLET ORAL 3 TIMES DAILY
Qty: 90 TABLET | Refills: 3 | Status: SHIPPED | OUTPATIENT
Start: 2020-04-21 | End: 2020-06-25 | Stop reason: SDUPTHER

## 2020-04-28 ASSESSMENT — ENCOUNTER SYMPTOMS
COUGH: 1
BOWEL INCONTINENCE: 1
BACK PAIN: 1

## 2020-04-30 ENCOUNTER — VIRTUAL VISIT (OUTPATIENT)
Dept: PAIN MANAGEMENT | Age: 50
End: 2020-04-30
Payer: COMMERCIAL

## 2020-04-30 VITALS — HEIGHT: 67 IN | BODY MASS INDEX: 53.09 KG/M2

## 2020-04-30 PROCEDURE — 99213 OFFICE O/P EST LOW 20 MIN: CPT | Performed by: PAIN MEDICINE

## 2020-04-30 RX ORDER — CELECOXIB 200 MG/1
200 CAPSULE ORAL DAILY
Qty: 30 CAPSULE | Refills: 2 | Status: SHIPPED | OUTPATIENT
Start: 2020-04-30 | End: 2020-11-12

## 2020-04-30 RX ORDER — GABAPENTIN 600 MG/1
600 TABLET ORAL 3 TIMES DAILY
Qty: 90 TABLET | Refills: 2 | Status: SHIPPED | OUTPATIENT
Start: 2020-04-30 | End: 2020-06-25 | Stop reason: SDUPTHER

## 2020-04-30 SDOH — HEALTH STABILITY: MENTAL HEALTH: HOW OFTEN DO YOU HAVE A DRINK CONTAINING ALCOHOL?: MONTHLY OR LESS

## 2020-05-05 ENCOUNTER — TELEPHONE (OUTPATIENT)
Dept: PAIN MANAGEMENT | Age: 50
End: 2020-05-05

## 2020-05-05 NOTE — TELEPHONE ENCOUNTER
Spoke with pt to update her on her current med situation and let her know we would contact her as soon as we hear back from her insurance.

## 2020-05-07 ENCOUNTER — NURSE TRIAGE (OUTPATIENT)
Dept: OTHER | Facility: CLINIC | Age: 50
End: 2020-05-07

## 2020-05-07 NOTE — TELEPHONE ENCOUNTER
Answer Assessment - Initial Assessment Questions  1. CLOSE CONTACT: \"Who is the person with the confirmed or suspected COVID-19 infection that you were exposed to? \"      unsure  2. PLACE of CONTACT: \"Where were you when you were exposed to COVID-19? \" (e.g., home, school, medical waiting room; which city?)      unsure  3. TYPE of CONTACT: \"How much contact was there? \" (e.g., sitting next to, live in same house, work in same office, same building)      unsure  4. DURATION of CONTACT: \"How long were you in contact with the COVID-19 patient? \" (e.g., a few seconds, passed by person, a few minutes, live with the patient)      unsure  5. DATE of CONTACT: \"When did you have contact with a COVID-19 patient? \" (e.g., how many days ago)      unsure  6. TRAVEL: \"Have you traveled out of the country recently? \" If so, \"When and where? \"      * Also ask about out-of-state travel, since the [facility] has identified some high risk cities for community spread in the [address] [place] [address] [address]. * Note: Travel becomes less relevant if there is widespread community transmission where the patient lives. No-\"PT STATES WHEN WOULD I HAVE TIME FOR THAT\"      7. COMMUNITY SPREAD: \"Are there lots of cases of COVID-19 (community spread) where you live? \" (See public health department website, if unsure)    * MAJOR community spread: high number of cases; numbers of cases are increasing; many people hospitalized. * MINOR community spread: low number of cases; not increasing; few or no people hospitalized      unsure  8. SYMPTOMS: \"Do you have any symptoms? \" (e.g., fever, cough, breathing difficulty)      STATES SHE ALWAYS HAD A COUGH BECAUSE SHE HAS \"ASTHMA AND COPD\", STATES IT BECAME PRODUCTIVE YESTERDAY COUGHING UP YELLOWISH PHLEGM, INCREASED SOB WITH ACTIVITY, MORE THAN NORMAL, FEVER .0 AT 10:00 TODAY BY FOREHEAD SCAN, PND, STUFFY NOSE, YELLOWISH NASAL DRAINAGE  9. PREGNANCY OR POSTPARTUM: \"Is there any chance you are pregnant? \" \"When was your last

## 2020-05-07 NOTE — TELEPHONE ENCOUNTER
Answer Assessment - Initial Assessment Questions  1. CLOSE CONTACT: \"Who is the person with the confirmed or suspected COVID-19 infection that you were exposed to? \"      unsure  2. PLACE of CONTACT: \"Where were you when you were exposed to COVID-19? \" (e.g., home, school, medical waiting room; which city?)      unsure  3. TYPE of CONTACT: \"How much contact was there? \" (e.g., sitting next to, live in same house, work in same office, same building)      unsure  4. DURATION of CONTACT: \"How long were you in contact with the COVID-19 patient? \" (e.g., a few seconds, passed by person, a few minutes, live with the patient)      unsure  5. DATE of CONTACT: \"When did you have contact with a COVID-19 patient? \" (e.g., how many days ago)      unsure  6. TRAVEL: \"Have you traveled out of the country recently? \" If so, \"When and where? \"      * Also ask about out-of-state travel, since the [facility] has identified some high risk cities for community spread in the [address] [place] [address] [address]. * Note: Travel becomes less relevant if there is widespread community transmission where the patient lives. No-\"PT STATES WHEN WOULD I HAVE TIME FOR THAT\"      7. COMMUNITY SPREAD: \"Are there lots of cases of COVID-19 (community spread) where you live? \" (See public health department website, if unsure)    * MAJOR community spread: high number of cases; numbers of cases are increasing; many people hospitalized. * MINOR community spread: low number of cases; not increasing; few or no people hospitalized      unsure  8. SYMPTOMS: \"Do you have any symptoms? \" (e.g., fever, cough, breathing difficulty)      STATES SHE ALWAYS HAD A COUGH BECAUSE SHE HAS \"ASTHMA AND COPD\", STATES IT BECAME PRODUCTIVE YESTERDAY COUGHING UP YELLOWISH PHLEGM, INCREASED SOB WITH ACTIVITY, MORE THAN NORMAL, FEVER .0 AT 10:00 TODAY BY FOREHEAD SCAN, PND, STUFFY NOSE, YELLOWISH NASAL DRAINAGE  9. PREGNANCY OR POSTPARTUM: \"Is there any chance you are pregnant? \" \"When was your last

## 2020-05-12 ENCOUNTER — TELEPHONE (OUTPATIENT)
Dept: PAIN MANAGEMENT | Age: 50
End: 2020-05-12

## 2020-05-12 RX ORDER — ALBUTEROL SULFATE 90 UG/1
AEROSOL, METERED RESPIRATORY (INHALATION)
Qty: 18 G | Refills: 2 | Status: SHIPPED | OUTPATIENT
Start: 2020-05-12 | End: 2020-08-30 | Stop reason: SDUPTHER

## 2020-05-13 RX ORDER — ALBUTEROL SULFATE 90 UG/1
AEROSOL, METERED RESPIRATORY (INHALATION)
Qty: 18 G | Refills: 2 | OUTPATIENT
Start: 2020-05-13

## 2020-05-13 NOTE — TELEPHONE ENCOUNTER
Health Maintenance   Topic Date Due    HIV screen  09/25/1985    Cervical cancer screen  09/25/1991    Potassium monitoring  06/03/2020    Flu vaccine (Season Ended) 09/01/2020    Creatinine monitoring  12/02/2020    Diabetes screen  08/06/2021    Lipid screen  04/11/2024    DTaP/Tdap/Td vaccine (2 - Td) 08/18/2027    Pneumococcal 0-64 years Vaccine  Completed    Hepatitis A vaccine  Aged Out    Hepatitis B vaccine  Aged Out    Hib vaccine  Aged Out    Meningococcal (ACWY) vaccine  Aged Out             (applicable per patient's age: Cancer Screenings, Depression Screening, Fall Risk Screening, Immunizations)    Hemoglobin A1C (%)   Date Value   08/06/2018 5.2   10/24/2014 5.4   05/03/2013 5.7     LDL Cholesterol (mg/dL)   Date Value   05/02/2013 111 (H)     LDL Calculated (mg/dL)   Date Value   04/11/2019 61     AST (U/L)   Date Value   06/03/2019 14     ALT (U/L)   Date Value   06/03/2019 11     BUN (mg/dL)   Date Value   12/02/2019 7      (goal A1C is < 7)   (goal LDL is <100) need 30-50% reduction from baseline     BP Readings from Last 3 Encounters:   01/23/20 134/79   01/09/20 (!) 191/100   01/09/20 (!) 159/92    (goal /80)      All Future Testing planned in [facility]:  Lab Frequency Next Occurrence       Next Visit Date:  Future Appointments   Date Time Provider Natalie Goldman   5/14/2020 10:45 AM Tammie Barber APRN - CNP [id number]   5/28/2020 11:00 AM Marlin Lawrence MD [address] [place] [address]            Patient Active Problem List:     COPD (chronic obstructive pulmonary disease) ([facility]     Acute exacerbation of chronic obstructive pulmonary disease (COPD) ([facility]     Multiple pulmonary nodules     Morbid obesity with BMI of 50.0-59.9, adult ([facility]     Leukocytosis     Asthma     Bipolar 1 disorder ([facility]     Anxiety     COPD exacerbation ([facility]     Benign essential HTN     Depression with anxiety     Abnormal TSH     Chronic bilateral low back pain with bilateral sciatica

## 2020-05-14 ENCOUNTER — TELEMEDICINE (OUTPATIENT)
Dept: INTERNAL MEDICINE | Age: 50
End: 2020-05-14
Payer: COMMERCIAL

## 2020-05-14 PROCEDURE — 99213 OFFICE O/P EST LOW 20 MIN: CPT | Performed by: NURSE PRACTITIONER

## 2020-05-14 PROCEDURE — G8926 SPIRO NO PERF OR DOC: HCPCS | Performed by: NURSE PRACTITIONER

## 2020-05-14 PROCEDURE — G8417 CALC BMI ABV UP PARAM F/U: HCPCS | Performed by: NURSE PRACTITIONER

## 2020-05-14 PROCEDURE — 1036F TOBACCO NON-USER: CPT | Performed by: NURSE PRACTITIONER

## 2020-05-14 PROCEDURE — G8427 DOCREV CUR MEDS BY ELIG CLIN: HCPCS | Performed by: NURSE PRACTITIONER

## 2020-05-14 PROCEDURE — 3023F SPIROM DOC REV: CPT | Performed by: NURSE PRACTITIONER

## 2020-05-14 RX ORDER — PREDNISONE 20 MG/1
20 TABLET ORAL DAILY
Qty: 5 TABLET | Refills: 0 | Status: SHIPPED | OUTPATIENT
Start: 2020-05-14 | End: 2020-05-19

## 2020-05-14 RX ORDER — DOXYCYCLINE HYCLATE 100 MG
100 TABLET ORAL 2 TIMES DAILY
Qty: 14 TABLET | Refills: 0 | Status: SHIPPED | OUTPATIENT
Start: 2020-05-14 | End: 2020-05-21

## 2020-05-14 ASSESSMENT — ENCOUNTER SYMPTOMS
COUGH: 1
NAUSEA: 0
VOMITING: 0
EYE ITCHING: 0
DIARRHEA: 0
EYE DISCHARGE: 0
CHEST TIGHTNESS: 0
RHINORRHEA: 1
SINUS PAIN: 1
SINUS PRESSURE: 1
TROUBLE SWALLOWING: 0
WHEEZING: 1

## 2020-05-14 NOTE — PROGRESS NOTES
Visit Information    Have you changed or started any medications since your last visit including any over-the-counter medicines, vitamins, or herbal medicines? no   Are you having any side effects from any of your medications? -  no  Have you stopped taking any of your medications? Is so, why? -  no    Have you seen any other physician or provider since your last visit? Yes - Records Requested  Have you had any other diagnostic tests since your last visit? No  Have you been seen in the emergency room and/or had an admission to a hospital since we last saw you? No  Have you had your routine dental cleaning in the past 6 months? no    Have you activated your [employer] account? If not, what are your barriers?  Yes     Patient Care Team:  LAWRENCE Spain CNP as PCP - General (Family Medicine)  LAWRENCE Spain CNP as PCP - [facility] Provider    Medical History Review  Past Medical, Family, and Social History reviewed and does not contribute to the patient presenting condition    Health Maintenance   Topic Date Due    HIV screen  09/25/1985    Cervical cancer screen  09/25/1991    Potassium monitoring  06/03/2020    Flu vaccine (Season Ended) 09/01/2020    Creatinine monitoring  12/02/2020    Diabetes screen  08/06/2021    Lipid screen  04/11/2024    DTaP/Tdap/Td vaccine (2 - Td) 08/18/2027    Pneumococcal 0-64 years Vaccine  Completed    Hepatitis A vaccine  Aged Out    Hepatitis B vaccine  Aged Out    Hib vaccine  Aged Out    Meningococcal (ACWY) vaccine  Aged Out

## 2020-05-14 NOTE — PROGRESS NOTES
Ruddy James is a 52 y.o. female evaluated virtual visit via [address] on 2020. Consent:  She and/or health care decision maker is aware that that she may receive a bill for this telephone service, depending on her insurance coverage, and has provided verbal consent to proceed: Yes      Documentation:  I communicated with the patient and/or health care decision maker about sinus infection. Details of this discussion including any medical advice provided below      I affirm this is a Patient Initiated Episode with an Established Patient who has not had a related appointment within my department in the past 7 days or scheduled within the next 24 hours. Total Time: minutes: 11-20 minutes    Note: not billable if this call serves to triage the patient into an appointment for the relevant concern      Kirby Gonzales                  2020    TELEHEALTH EVALUATION -- Audio/Visual (During AYBCL-62 public health emergency)    Patient and physician are located in their individual homes    HPI:    Ruddy James (:  1970) has requested an audio only/video evaluation for the following concern(s):    Wheezing, cough    Jewels Guard reports she is been experiencing symptoms for roughly 9 days that are not improving. She denies any GI symptoms, no chest pain. Fevers ranging from 99.8-100.8. She also reports she is using her albuterol inhaler consistently, almost every 4 hours. She denies any seasonal allergy symptoms, no sneezing or itchy watery eyes. URI    This is a new (9 days) problem. The problem has been unchanged. The maximum temperature recorded prior to her arrival was 100.4 - 100.9 F. The fever has been present for 5 days or more. Associated symptoms include congestion, coughing, rhinorrhea, sinus pain and wheezing. Pertinent negatives include no chest pain, diarrhea, dysuria, nausea, sneezing or vomiting. Review of Systems   Constitutional: Positive for fatigue and fever.    HENT: Positive for congestion, rhinorrhea, sinus pressure and sinus pain. Negative for ear discharge, sneezing and trouble swallowing. Eyes: Negative for discharge and itching. Respiratory: Positive for cough and wheezing. Negative for chest tightness. Cardiovascular: Negative for chest pain and leg swelling. Gastrointestinal: Negative for diarrhea, nausea and vomiting. Genitourinary: Negative for dysuria. Prior to Visit Medications    Medication Sig Taking? Authorizing Provider   predniSONE (DELTASONE) 20 MG tablet Take 1 tablet by mouth daily for 5 days Yes LAWRENCE Hyatt CNP   doxycycline hyclate (VIBRA-TABS) 100 MG tablet Take 1 tablet by mouth 2 times daily for 7 days Yes LAWRENCE Hyatt CNP   albuterol sulfate  (90 Base) MCG/ACT inhaler inhale 1 to 2 puffs by mouth every 4 to 6 hours if needed for wheezing or shortness of breath (SPACE OUT TO EVERY 6 HOURS AS SYMPTOMS IMPROVE) Yes LAWRENCE Hyatt CNP   gabapentin (NEURONTIN) 600 MG tablet Take 1 tablet by mouth 3 times daily for 90 days. Yes Aicha Anderson MD   baclofen (LIORESAL) 10 MG tablet Take 1 tablet by mouth 3 times daily Yes LAWRENCE Michaud CNP   VITAMIN D HIGH POTENCY 25 MCG (1000 UT) CAPS take 2 capsules by mouth once daily Yes Historical Provider, MD   albuterol (PROVENTIL) (2.5 MG/3ML) 0.083% nebulizer solution Take 3 mLs by nebulization every 4 hours as needed for Wheezing Yes LAWRENCE Hyatt CNP   venlafaxine (EFFEXOR XR) 150 MG extended release capsule  Yes Historical Provider, MD   lithium 600 MG capsule  Yes Historical Provider, MD   lithium 300 MG tablet Take 300 mg by mouth 2 times daily 450 mg am 900 mg pm  Yes Historical Provider, MD   ALPRAZolam (XANAX) 0.5 MG tablet Take 0.5 mg by mouth nightly as needed for Sleep.  Yes Historical Provider, MD   celecoxib (CELEBREX) 200 MG capsule Take 1 capsule by mouth daily  Aicha Anderson MD   pregabalin (LYRICA) 75 MG capsule Take 1 capsule by mouth 2 times daily for 14 days. Take 1 capsule by mouth for 14 days  Marry Councilman, MD   pregabalin (LYRICA) 150 MG capsule Take 1 capsule by mouth 2 times daily for 30 days.   Marry Councilman, MD   Incontinence Supplies MISC 1 Units by Does not apply route 5 times daily  Louis Mancuso APRN - CNP       Social History     Tobacco Use    Smoking status: Former Smoker     Packs/day: 2.00     Years: 20.00     Pack years: 40.00     Types: Cigarettes     Last attempt to quit: 1/10/2010     Years since quitting: 10.3    Smokeless tobacco: Never Used   Substance Use Topics    Alcohol use: Yes     Frequency: Monthly or less     Binge frequency: Less than monthly    Drug use: No        Past Medical History:   Diagnosis Date    Acute exacerbation of COPD with asthma ([facility] 6/7/16    Anxiety     Asthma     Benign essential HTN     Bipolar 1 disorder ([facility]     COPD (chronic obstructive pulmonary disease) ([facility]     Depression     Fibromyalgia     Headache(784.0)     Hip arthritis 11/12/2019    Pneumonia due to infectious organism 6/7/2016    Post traumatic stress disorder         Allergies   Allergen Reactions    Food Anaphylaxis     raisins    Penicillins Anaphylaxis    Parafon Forte Dsc [Chlorzoxazone] Hives    Biaxin [Clarithromycin] Rash   ,   Past Medical History:   Diagnosis Date    Acute exacerbation of COPD with asthma ([facility] 6/7/16    Anxiety     Asthma     Benign essential HTN     Bipolar 1 disorder ([facility]     [facility]     Depression     Fibromyalgia     Headache(784.0)     Hip arthritis 11/12/2019    Pneumonia due to infectious organism 6/7/2016    Post traumatic stress disorder    ,   Social History     Tobacco Use    Smoking status: Former Smoker     Packs/day: 2.00     Years: 20.00     Pack years: 40.00     Types: Cigarettes     Last attempt to quit: 1/10/2010     Years since quitting: 10.3    Smokeless tobacco: Never tablet; Take 1 tablet by mouth 2 times daily for 7 days    Acute exacerbation of COPD with asthma ([facility]  -     predniSONE (DELTASONE) 20 MG tablet; Take 1 tablet by mouth daily for 5 days    -Penicillin allergy, will prescribe doxycycline. Given persistent wheezing and increased use of albuterol we also discussed and agreed to a 5-day course of steroids. Contact my office if breathing worsens despite use of steroids or URI symptoms do not improve in 3 to 4 days with antibiotics. Patient verbalized understanding.  -Symptoms do not appear consistent with coronavirus at this time, however patient is quarantining herself at home given her comorbid conditions. No follow-ups on file. An  electronic signature was used to authenticate this note. --LAWRENCE Amin - CNP on 5/14/2020 at 11:09 AM    9}    Pursuant to the emergency declaration under the [facility], [facility] waiver authority and the [employer] Act, this Virtual  Visit was conducted, with patient's consent, to reduce the patient's risk of exposure to COVID-19 and provide continuity of care for an established patient. Services were provided through a video synchronous discussion virtually to substitute for in-person clinic visit.

## 2020-05-20 ASSESSMENT — ENCOUNTER SYMPTOMS
ABDOMINAL PAIN: 0
WHEEZING: 1
DOUBLE VISION: 0
BLURRED VISION: 0
COUGH: 1

## 2020-05-20 NOTE — PROGRESS NOTES
Family History   Problem Relation Age of Onset    Hypertension Mother     Heart Attack Mother     Heart Disease Mother     Hypertension Father     Heart Disease Father     Diabetes Other         cousin    Heart Disease Brother        Social History     Socioeconomic History    Marital status:      Spouse name: Not on file    Number of children: Not on file    Years of education: Not on file    Highest education level: Not on file   Occupational History    Not on file   Social Needs    Financial resource strain: Not on file    Food insecurity     Worry: Not on file     Inability: Not on file    Transportation needs     Medical: Not on file     Non-medical: Not on file   Tobacco Use    Smoking status: Former Smoker     Packs/day: 2.00     Years: 20.00     Pack years: 40.00     Types: Cigarettes     Last attempt to quit: 1/10/2010     Years since quitting: 10.3    Smokeless tobacco: Never Used   Substance and Sexual Activity    Alcohol use: Yes     Frequency: Monthly or less     Binge frequency: Less than monthly    Drug use: No    Sexual activity: Not on file   Lifestyle    Physical activity     Days per week: Not on file     Minutes per session: Not on file    Stress: Not on file   Relationships    Social connections     Talks on phone: Not on file     Gets together: Not on file     Attends Islam service: Not on file     Active member of club or organization: Not on file     Attends meetings of clubs or organizations: Not on file     Relationship status: Not on file    Intimate partner violence     Fear of current or ex partner: Not on file     Emotionally abused: Not on file     Physically abused: Not on file     Forced sexual activity: Not on file   Other Topics Concern    Not on file   Social History Narrative    Not on file       Review of Systems:  Review of Systems   Eyes: Negative for blurred vision and double vision. Respiratory: Positive for cough and wheezing.

## 2020-05-21 ENCOUNTER — VIRTUAL VISIT (OUTPATIENT)
Dept: PAIN MANAGEMENT | Age: 50
End: 2020-05-21
Payer: COMMERCIAL

## 2020-05-21 PROCEDURE — 99214 OFFICE O/P EST MOD 30 MIN: CPT | Performed by: PAIN MEDICINE

## 2020-05-21 RX ORDER — PRAZOSIN HYDROCHLORIDE 2 MG/1
CAPSULE ORAL
COMMUNITY
Start: 2020-05-13 | End: 2021-06-29

## 2020-05-22 ENCOUNTER — TELEPHONE (OUTPATIENT)
Dept: PAIN MANAGEMENT | Age: 50
End: 2020-05-22

## 2020-05-22 NOTE — TELEPHONE ENCOUNTER
Called patient and spoke with her to let her know to give us a call once her MRI is scheduled, to make a follow-up appointment.

## 2020-06-04 ENCOUNTER — TELEPHONE (OUTPATIENT)
Dept: PAIN MANAGEMENT | Age: 50
End: 2020-06-04

## 2020-06-05 NOTE — TELEPHONE ENCOUNTER
Writer called patient left her know that Dr. Dee Sample referring her to Neurosurgery and she need to see them for the edema in here lumbar spine. She states she need some pain medication in the meantime. Writer offered her an appt she states what am I supposed to do for pain. She asked my name and said she is going to file a complaint.

## 2020-06-16 ENCOUNTER — TELEPHONE (OUTPATIENT)
Dept: PAIN MANAGEMENT | Age: 50
End: 2020-06-16

## 2020-06-25 ENCOUNTER — OFFICE VISIT (OUTPATIENT)
Dept: PAIN MANAGEMENT | Age: 50
End: 2020-06-25
Payer: COMMERCIAL

## 2020-06-25 VITALS — SYSTOLIC BLOOD PRESSURE: 119 MMHG | TEMPERATURE: 97.2 F | DIASTOLIC BLOOD PRESSURE: 71 MMHG

## 2020-06-25 PROCEDURE — 99213 OFFICE O/P EST LOW 20 MIN: CPT | Performed by: PAIN MEDICINE

## 2020-06-25 RX ORDER — BACLOFEN 10 MG/1
10 TABLET ORAL 3 TIMES DAILY
Qty: 90 TABLET | Refills: 3 | Status: SHIPPED | OUTPATIENT
Start: 2020-06-25 | End: 2022-02-01

## 2020-06-25 RX ORDER — GABAPENTIN 600 MG/1
600 TABLET ORAL 3 TIMES DAILY
Qty: 90 TABLET | Refills: 2 | Status: SHIPPED | OUTPATIENT
Start: 2020-06-25 | End: 2021-06-29

## 2020-06-25 ASSESSMENT — ENCOUNTER SYMPTOMS
COUGH: 1
WHEEZING: 1

## 2020-06-25 NOTE — PROGRESS NOTES
HPI: Hip Pain    The incident occurred more than 1 week ago. The injury mechanism is unknown. The pain is present in the right hip, right thigh, right knee, right leg, left toes, left foot, left heel, left ankle, left knee, left leg, left hip, left thigh, right heel, right ankle, right toes and right foot. The quality of the pain is described as shooting, burning and aching. The pain is at a severity of 9/10. The pain is severe. The pain has been constant since onset. Associated symptoms include an inability to bear weight, a loss of motion, a loss of sensation, muscle weakness, numbness and tingling. She reports no foreign bodies present. The symptoms are aggravated by weight bearing and movement. She has tried immobilization, ice, heat, elevation, non-weight bearing and rest for the symptoms. The treatment provided mild relief. Knee Pain    The incident occurred more than 1 week ago. The pain is present in the right knee and left knee. The pain is at a severity of 9/10. The pain is severe. The pain has been constant since onset. Associated symptoms include an inability to bear weight, a loss of motion, a loss of sensation, muscle weakness, numbness and tingling. She reports no foreign bodies present. The symptoms are aggravated by weight bearing and movement. She has tried non-weight bearing, NSAIDs, immobilization, heat, ice, elevation, acetaminophen and rest for the symptoms. The treatment provided mild relief. X-rays with advanced degenerative changes of her hips. Mild degenerative changes of the knees. MRI of her lumbar spine with previous decompression and degenerative changes as well as subcutaneous edema. MRI of her thoracic spine with multilevel degenerative changes but states she has recently seen a neurosurgeon for her back with nothing surgical at this time. She has seen several orthopedic surgeons for her hips but she states that nobody will operate on her due to her weight.   We have discussed neuromodulation in the form of spinal cord stimulation for her chronic pain issues. On Neurontin 600 mg 3 times daily, as well as baclofen as needed for spasms. Does not feel this provides adequate relief. Chart review shows she has been verbally abusive with previous clinics. She has been quite rude with our staff here however I have been trying to give her the benefit of the doubt, however she continues to be quite confrontational.    Review of OARRS does not show any aberrant prescription behavior. Past Medical History:   Diagnosis Date    Acute exacerbation of COPD with asthma ([address] 6/7/16    Anxiety     Asthma     Benign essential HTN     B[facility] disorder ([facility]     COPD (chronic obstructive pulmonary disease) (HCC)     Depression     Fibromyalgia     Headache(784.0)     Hip arthritis 11/12/2019    Pneumonia due to infectious organism 6/7/2016    Post traumatic stress disorder        Past Surgical History:   Procedure Laterality Date    ANESTHESIA NERVE BLOCK N/A 1/9/2020    NERVE BLOCK BILATERAL - KNEE performed by Esperanza Galeas MD at [address] NERV Bilateral 12/5/2019    INJECTION MEDICATION - HIP performed by Esperanza Galeas MD at [address] Bilateral 01/09/2020    knees     TONSILLECTOMY         Allergies   Allergen Reactions    Food Anaphylaxis     raisins    Penicillins Anaphylaxis    Parafon Forte Dsc [Chlorzoxazone] Hives    Biaxin [Clarithromycin] Rash         Current Outpatient Medications:     gabapentin (NEURONTIN) 600 MG tablet, Take 1 tablet by mouth 3 times daily for 90 days. , Disp: 90 tablet, Rfl: 2    baclofen (LIORESAL) 10 MG tablet, Take 1 tablet by mouth 3 times daily, Disp: 90 tablet, Rfl: 3    prazosin (MINIPRESS) 2 MG capsule, take 1 capsule by mouth at bedtime, Disp: , Rfl:     albuterol sulfate  (90 Base) MCG/ACT inhaler, inhale 1 to 2 puffs by mouth every 4 to 6 hours if needed for wheezing or shortness of breath (SPACE OUT TO EVERY 6 HOURS AS SYMPTOMS IMPROVE), Disp: 18 g, Rfl: 2    celecoxib (CELEBREX) 200 MG capsule, Take 1 capsule by mouth daily, Disp: 30 capsule, Rfl: 2    pregabalin (LYRICA) 75 MG capsule, Take 1 capsule by mouth 2 times daily for 14 days. Take 1 capsule by mouth for 14 days, Disp: 28 capsule, Rfl: 0    pregabalin (LYRICA) 150 MG capsule, Take 1 capsule by mouth 2 times daily for 30 days. , Disp: 60 capsule, Rfl: 0    VITAMIN D HIGH POTENCY 25 MCG (1000 UT) CAPS, take 2 capsules by mouth once daily, Disp: , Rfl: 0    albuterol (PROVENTIL) (2.5 MG/3ML) 0.083% nebulizer solution, Take 3 mLs by nebulization every 4 hours as needed for Wheezing, Disp: 120 each, Rfl: 3    venlafaxine (EFFEXOR XR) 150 MG extended release capsule, , Disp: , Rfl:     lithium 600 MG capsule, , Disp: , Rfl:     lithium 300 MG tablet, Take 300 mg by mouth 2 times daily 450 mg am 900 mg pm , Disp: , Rfl:     ALPRAZolam (XANAX) 0.5 MG tablet, Take 0.5 mg by mouth nightly as needed for Sleep., Disp: , Rfl:     Incontinence Supplies MISC, 1 Units by Does not apply route 5 times daily, Disp: 200 each, Rfl: 5    Family History   Problem Relation Age of Onset    Hypertension Mother     Heart Attack Mother     Heart Disease Mother     Hypertension Father     Heart Disease Father     Diabetes Other         cousin    Heart Disease Brother        Social History     Socioeconomic History    Marital status:       Spouse name: Not on file    Number of children: Not on file    Years of education: Not on file    Highest education level: Not on file   Occupational History    Not on file   Social Needs    Financial resource strain: Not on file    Food insecurity     Worry: Not on file     Inability: Not on file    Transportation needs     Medical: Not on file     Non-medical: Not on file   Tobacco Use    Smoking status: Former Smoker     Packs/day: 2.00 office she yelled at me \"You are a piece of shit\" while waving her middle finger at me. This was witnessed by my office staff. My  Álvaro Brooks was present for the entire encounter. She has been rude and abusive to staff in the past but I have tried to give her the benefit of doubt due to her chronic pain issues however at this point she will be discharged from the practice, she was informed of this and discharge letter was sent. Chuck Azul M.D. I have reviewed the chief complaint and history of present illness (including ROS and PFSH) and vital documentation by my staff and I agree with their documentation and have added where applicable.

## 2020-08-19 ENCOUNTER — TELEPHONE (OUTPATIENT)
Dept: PRIMARY CARE CLINIC | Age: 50
End: 2020-08-19

## 2020-08-19 NOTE — TELEPHONE ENCOUNTER
Patient called lm requesting referral to [facility] fax number [phone] and a referral to [facility] fax number [phone]. Patient stated she needs referrals for spinal stenosis, fibromyalgia, and leg tremors. Please advise      Health Maintenance   Topic Date Due    HIV screen  09/25/1985    Cervical cancer screen  09/25/1991    Potassium monitoring  06/03/2020    Flu vaccine (1) 09/01/2020    Creatinine monitoring  12/02/2020    Diabetes screen  08/06/2021    Lipid screen  04/11/2024    DTaP/Tdap/Td vaccine (2 - Td) 08/18/2027    Pneumococcal 0-64 years Vaccine  Completed    Hepatitis A vaccine  Aged Out    Hepatitis B vaccine  Aged Out    Hib vaccine  Aged Out    Meningococcal (ACWY) vaccine  Aged Out             (applicable per patient's age: Cancer Screenings, Depression Screening, Fall Risk Screening, Immunizations)    Hemoglobin A1C (%)   Date Value   08/06/2018 5.2   10/24/2014 5.4   05/03/2013 5.7     LDL Cholesterol (mg/dL)   Date Value   05/02/2013 111 (H)     LDL Calculated (mg/dL)   Date Value   04/11/2019 61     AST (U/L)   Date Value   06/03/2019 14     ALT (U/L)   Date Value   06/03/2019 11     BUN (mg/dL)   Date Value   12/02/2019 7      (goal A1C is < 7)   (goal LDL is <100) need 30-50% reduction from baseline     BP Readings from Last 3 Encounters:   06/25/20 119/71   01/23/20 134/79   01/09/20 (!) 191/100    (goal /80)      All Future Testing planned in [facility]:  Lab Frequency Next Occurrence       Next Visit Date:  No future appointments.          Patient Active Problem List:     COPD (chronic obstructive pulmonary disease) ([facility]     Acute exacerbation of chronic obstructive pulmonary disease (COPD) ([facility]     Multiple pulmonary nodules     Morbid obesity with BMI of 50.0-59.9, adult (HCC)     Leukocytosis     Asthma     Bipolar 1 disorder (HCC)     Anxiety     COPD exacerbation (HCC)     Benign essential HTN     Depression with anxiety Abnormal TSH     Chronic bilateral low back pain with bilateral sciatica     Fibromyalgia     Hip arthritis     Hip pain, bilateral     Chronic pain of both knees

## 2020-08-31 RX ORDER — ALBUTEROL SULFATE 90 UG/1
1 AEROSOL, METERED RESPIRATORY (INHALATION) EVERY 4 HOURS PRN
Qty: 18 G | Refills: 2 | Status: SHIPPED | OUTPATIENT
Start: 2020-08-31 | End: 2020-12-30 | Stop reason: SDUPTHER

## 2020-08-31 NOTE — TELEPHONE ENCOUNTER
Health Maintenance   Topic Date Due    HIV screen  09/25/1985    Cervical cancer screen  09/25/1991    Potassium monitoring  06/03/2020    Flu vaccine (1) 09/01/2020    Creatinine monitoring  12/02/2020    Diabetes screen  08/06/2021    Lipid screen  04/11/2024    DTaP/Tdap/Td vaccine (2 - Td) 08/18/2027    Pneumococcal 0-64 years Vaccine  Completed    Hepatitis A vaccine  Aged Out    Hepatitis B vaccine  Aged Out    Hib vaccine  Aged Out    Meningococcal (ACWY) vaccine  Aged Out             (applicable per patient's age: Cancer Screenings, Depression Screening, Fall Risk Screening, Immunizations)    Hemoglobin A1C (%)   Date Value   08/06/2018 5.2   10/24/2014 5.4   05/03/2013 5.7     LDL Cholesterol (mg/dL)   Date Value   05/02/2013 111 (H)     LDL Calculated (mg/dL)   Date Value   04/11/2019 61     AST (U/L)   Date Value   06/03/2019 14     ALT (U/L)   Date Value   06/03/2019 11     BUN (mg/dL)   Date Value   12/02/2019 7      (goal A1C is < 7)   (goal LDL is <100) need 30-50% reduction from baseline     BP Readings from Last 3 Encounters:   06/25/20 119/71   01/23/20 134/79   01/09/20 (!) 191/100    (goal /80)      All Future Testing planned in [facility]:  Lab Frequency Next Occurrence       Next Visit Date:  No future appointments.          Patient Active Problem List:     COPD (chronic obstructive pulmonary disease) ([facility]     Acute exacerbation of chronic obstructive pulmonary disease (COPD) ([facility]     Multiple pulmonary nodules     Morbid obesity with BMI of 50.0-59.9, adult (HCC)     Leukocytosis     Asthma     Bipolar 1 disorder (HCC)     Anxiety     COPD exacerbation (HCC)     Benign essential HTN     Depression with anxiety     Abnormal TSH     Chronic bilateral low back pain with bilateral sciatica     Fibromyalgia     Hip arthritis     Hip pain, bilateral     Chronic pain of both knees

## 2020-09-02 ENCOUNTER — TELEPHONE (OUTPATIENT)
Dept: PRIMARY CARE CLINIC | Age: 50
End: 2020-09-02

## 2020-09-28 ENCOUNTER — HOSPITAL ENCOUNTER (OUTPATIENT)
Age: 50
Setting detail: SPECIMEN
Discharge: HOME | End: 2020-09-28
Payer: COMMERCIAL

## 2020-09-28 LAB
ABSOLUTE EOS #: 1.02 K/UL (ref 0–0.44)
ABSOLUTE IMMATURE GRANULOCYTE: 0.05 K/UL (ref 0–0.3)
ABSOLUTE LYMPH #: 3.13 K/UL (ref 1.1–3.7)
ABSOLUTE MONO #: 0.59 K/UL (ref 0.1–1.2)
BASOPHILS # BLD: 1 % (ref 0–2)
BASOPHILS ABSOLUTE: 0.07 K/UL (ref 0–0.2)
BUN BLDV-MCNC: 8 MG/DL (ref 6–20)
C-REACTIVE PROTEIN: 4.8 MG/L (ref 0–5)
CREAT SERPL-MCNC: 0.6 MG/DL (ref 0.5–0.9)
DIFFERENTIAL TYPE: ABNORMAL
EOSINOPHILS RELATIVE PERCENT: 12 % (ref 1–4)
GFR AFRICAN AMERICAN: >60 ML/MIN
GFR NON-AFRICAN AMERICAN: >60 ML/MIN
GFR SERPL CREATININE-BSD FRML MDRD: NORMAL ML/MIN/{1.73_M2}
GFR SERPL CREATININE-BSD FRML MDRD: NORMAL ML/MIN/{1.73_M2}
HCT VFR BLD CALC: 44.7 % (ref 36.3–47.1)
HEMOGLOBIN: 14.4 G/DL (ref 11.9–15.1)
IMMATURE GRANULOCYTES: 1 %
LITHIUM DATE LAST DOSE: NORMAL
LITHIUM DOSE AMOUNT: NORMAL
LITHIUM DOSE TIME: NORMAL
LITHIUM LEVEL: 0.9 MMOL/L (ref 0.6–1.2)
LYMPHOCYTES # BLD: 36 % (ref 24–43)
MCH RBC QN AUTO: 31.8 PG (ref 25.2–33.5)
MCHC RBC AUTO-ENTMCNC: 32.2 G/DL (ref 28.4–34.8)
MCV RBC AUTO: 98.7 FL (ref 82.6–102.9)
MONOCYTES # BLD: 7 % (ref 3–12)
NRBC AUTOMATED: 0 PER 100 WBC
PDW BLD-RTO: 13 % (ref 11.8–14.4)
PLATELET # BLD: 269 K/UL (ref 138–453)
PLATELET ESTIMATE: ABNORMAL
PMV BLD AUTO: 11.6 FL (ref 8.1–13.5)
RBC # BLD: 4.53 M/UL (ref 3.95–5.11)
RBC # BLD: ABNORMAL 10*6/UL
SEDIMENTATION RATE, ERYTHROCYTE: 19 MM (ref 0–30)
SEG NEUTROPHILS: 43 % (ref 36–65)
SEGMENTED NEUTROPHILS ABSOLUTE COUNT: 3.94 K/UL (ref 1.5–8.1)
TOTAL CK: 39 U/L (ref 26–192)
TSH SERPL DL<=0.05 MIU/L-ACNC: 3.5 MIU/L (ref 0.3–5)
VITAMIN D 25-HYDROXY: 22.4 NG/ML (ref 30–100)
WBC # BLD: 8.8 K/UL (ref 3.5–11.3)
WBC # BLD: ABNORMAL 10*3/UL

## 2020-10-02 ENCOUNTER — TELEPHONE (OUTPATIENT)
Dept: BARIATRICS/WEIGHT MGMT | Age: 50
End: 2020-10-02

## 2020-10-02 NOTE — TELEPHONE ENCOUNTER
Called patient to scheduled appt. Patient given appointment for 11/12/20. I asked patient if she had a pencil and paper to write down appt time and address and she refused. Online Info Session Completed:  on 9/30 okay to schedule with Dr. Yin Hernandez   with [facility]    Patient informed the following: This is NOT a guarantee of payment  When stating that you have a Benefit or Coverage for Bariatric Surgery - that means that you may qualify for the surgery  Bariatric Surgery is considered an elective procedure, patient is responsible to know their benefits . Any information we obtain when calling your insurance  is not  a guarantee of  coverage  and/or  benefit. Appointment Note :   New Patient , [facility],   6   month visits,  PG Fee $200,  Mailed Packet or advised to arrive  early      Remind Patient of $200 Program fee with $ 100 required at Second visit with office on initial dietician visit. Remind Patient they must be nicotine free. They will be tested at the beginning of the program and prior to surgery. Advise Patient Responsible for out of pocket, copay at medical visits, Deductible and coinsurance applied to medical visits and procedure. You will be responsible for any of the following:  · Copays   · Deductibles   · Co insurances     The items mentioned above are indicated or required by your insurance plan. Your deductible and coinsurance are applied to medical visits and procedures. Verified with patient if he or she has had any previous bariatric surgery? no  ( If yes ,advise patient of transfer of care process and program fee)       .

## 2020-11-12 ENCOUNTER — OFFICE VISIT (OUTPATIENT)
Dept: BARIATRICS/WEIGHT MGMT | Age: 50
End: 2020-11-12
Payer: COMMERCIAL

## 2020-11-12 VITALS
HEIGHT: 68 IN | BODY MASS INDEX: 44.41 KG/M2 | WEIGHT: 293 LBS | DIASTOLIC BLOOD PRESSURE: 68 MMHG | SYSTOLIC BLOOD PRESSURE: 110 MMHG | HEART RATE: 92 BPM | TEMPERATURE: 97.3 F

## 2020-11-12 PROCEDURE — G8484 FLU IMMUNIZE NO ADMIN: HCPCS | Performed by: SURGERY

## 2020-11-12 PROCEDURE — 3017F COLORECTAL CA SCREEN DOC REV: CPT | Performed by: SURGERY

## 2020-11-12 PROCEDURE — G8417 CALC BMI ABV UP PARAM F/U: HCPCS | Performed by: SURGERY

## 2020-11-12 PROCEDURE — 99204 OFFICE O/P NEW MOD 45 MIN: CPT | Performed by: SURGERY

## 2020-11-12 PROCEDURE — G8427 DOCREV CUR MEDS BY ELIG CLIN: HCPCS | Performed by: SURGERY

## 2020-11-12 PROCEDURE — 1036F TOBACCO NON-USER: CPT | Performed by: SURGERY

## 2020-11-12 RX ORDER — GABAPENTIN 600 MG/1
600 TABLET ORAL 3 TIMES DAILY
COMMUNITY

## 2020-11-12 RX ORDER — TRAMADOL HYDROCHLORIDE 50 MG/1
50 TABLET ORAL EVERY 6 HOURS
COMMUNITY
End: 2021-03-23

## 2020-11-12 NOTE — PROGRESS NOTES
[facility]  [facility]  [address] [place] [address] [address] [address]  Dept: [phone]    SURGICAL WEIGHT MANAGEMENT PROGRAM  PROGRESS NOTE INITIAL EVALUATION     Patient: Becka Lea        Service Date: 11/12/2020      HPI:     Chief Complaint   Patient presents with    Bariatric, Initial Visit    Weight Loss       The patient is a pleasant 48y.o. year old female  with morbid obesity, who stands Height: 5' 8\" (172.7 cm) tall with a weight of Weight: (!) 326 lb (147.9 kg) , resulting in a BMI of Body mass index is 49.57 kg/m². . The patient suffers from multiple co-morbidities as a result of morbid obesity, including: Hypertension, Asthma, Degenerative Joint Disease (DJD), Depression and Anxiety. She has suffered from obesity for many years. The patient denies  a history of myocardial infarction, deep vein thrombosis, pulmonary embolism, renal failure and hepatic failure. The patient has failed multiple attempts at non-surgical weight loss, and is now seeking surgical intervention to promote permanent and consistent weight loss. She  has chosen Sleeve Gastrectomy. She is well educated regarding it, as she has recently viewed our weight loss surgery informational seminar .      Medical History:  Past Medical History:   Diagnosis Date    Acute exacerbation of COPD with asthma ([facility] [address] 6/7/16    Anxiety     Asthma     Benign essential HTN     Bipolar 1 disorder ([address]     COPD (chronic obstructive pulmonary disease) (HCC)     Depression     Fibromyalgia     Headache(784.0)     Hip arthritis 11/12/2019    Obesity     Pneumonia due to infectious organism 6/7/2016    Post traumatic stress disorder        Surgical History:  Past Surgical History:   Procedure Laterality Date    ANESTHESIA NERVE BLOCK N/A 1/9/2020    NERVE BLOCK BILATERAL - KNEE performed by [address] Jania Flaherty MD at [address] [facility] Bilateral 12/5/2019    INJECTION MEDICATION - HIP performed by Britney Odom MD at [address] Bilateral 01/09/2020    knees     TONSILLECTOMY         Family History:      Problem Relation Age of Onset    Hypertension Mother     Heart Attack Mother     Heart Disease Mother     Hypertension Father     Heart Disease Father     Diabetes Other         cousin    Heart Disease Brother        Social History:   Social History     Tobacco Use    Smoking status: Former Smoker     Packs/day: 2.00     Years: 20.00     Pack years: 40.00     Types: Cigarettes     Last attempt to quit: 1/10/2010     Years since quitting: 10.8    Smokeless tobacco: Never Used   Substance Use Topics    Alcohol use: Yes     Frequency: Monthly or less     Binge frequency: Less than monthly     Comment: rare    Drug use: No       Current Med List:  Current Outpatient Medications   Medication Sig Dispense Refill    gabapentin (NEURONTIN) 600 MG tablet Take 600 mg by mouth 3 times daily.  traMADol (ULTRAM) 50 MG tablet Take 50 mg by mouth every 6 hours.  albuterol sulfate  (90 Base) MCG/ACT inhaler Inhale 1 puff into the lungs every 4 hours as needed for Wheezing 18 g 2    baclofen (LIORESAL) 10 MG tablet Take 1 tablet by mouth 3 times daily 90 tablet 3    prazosin (MINIPRESS) 2 MG capsule take 1 capsule by mouth at bedtime      VITAMIN D HIGH POTENCY 25 MCG (1000 UT) CAPS take 2 capsules by mouth once daily  0    albuterol (PROVENTIL) (2.5 MG/3ML) 0.083% nebulizer solution Take 3 mLs by nebulization every 4 hours as needed for Wheezing 120 each 3    venlafaxine (EFFEXOR XR) 150 MG extended release capsule       lithium 600 MG capsule       lithium 300 MG tablet Take 300 mg by mouth 2 times daily 450 mg am 900 mg pm       ALPRAZolam (XANAX) 0.5 MG tablet Take 0.5 mg by mouth nightly as needed for Sleep.  gabapentin (NEURONTIN) 600 MG tablet Take 1 tablet by mouth 3 times daily for 90 days.  80 tablet 2    pregabalin (LYRICA) 75 MG capsule Take 1 capsule by mouth 2 times daily for 14 days. Take 1 capsule by mouth for 14 days 28 capsule 0    pregabalin (LYRICA) 150 MG capsule Take 1 capsule by mouth 2 times daily for 30 days. 60 capsule 0    Incontinence Supplies MISC 1 Units by Does not apply route 5 times daily 200 each 5     No current facility-administered medications for this visit. Allergies   Allergen Reactions    Food Anaphylaxis     raisins    Penicillins Anaphylaxis    Parafon Forte Dsc [Chlorzoxazone] Hives    Biaxin [Clarithromycin] Rash       SOCIAL:      This patient is alone for the evaluation today. [] HIV Risk Factors (i.e.) intravenous drug abuser; at risk sexual behavior; received blood products    [] TB Risk Factors (i.e.) Medically underserved, institutional care, foreign born, endemic area; exposure to active case    [] Hepatitis B&C Risk Factors (i.e.) Received blood transfusion prior to 1992; recreational drug use; high risk sexual behaviors; tattoos or body piercings; contact with blood or needle sticks in the workplace    Comprehension    Ability to grasp concepts and respond to questions:   [x] High   [] Medium   [] Low    Motivation    [x] Asks Questions; eager to learn   [] Needs education   [] Extreme anxiety    [] uncooperative   [] Denies need for education    English Speaking Ability    [x] Speaks English well   [x] Reads English well   [x] Understands spoken [address]    [] Understands written English   [] No need for interpretive support      [] Might benefit from interpretive support   []  required for all services     REVIEW OF SYSTEMS: (Negative unless marked otherwise)       Do you or have you had any of the following?   Cardiovascular YES NO Respiratory YES NO   High Blood Pressure   [x]   [] COPD   []   []   Heart Attack   []   [] TB/Positive skin Test   []   []   Congestive Heart Failure   []   [] Obstructive Sleep Apnea   [x]   [] Coronary Artery Disease   []   [] Asthma   [x]   []   Circulation Problems   []   []      Activity Intolerance   []   [] Gastrointestinal YES NO   Peripheral Vascular Disease   []   [] Gastric Problems   []   []        Colorectal problems   []   []   Hematological YES NO Ulcer disease   []   []   Bleeding Tendencies   []   [] Liver disease   []   []   Blood Transfusion last 30d   []   [] Gallstones   []   []   Anemia   []   [] Refulx or Heartburn   []   []   Blood Clots   []   []      High Cholesterol   []   [] Muscoloskeletal YES NO   High Triglycerides   []   [] Joint Limitations   []   []      Muscle Weakness   []   []   Eyes, Ears, Nose, Throat YES NO Multiple Sclerosis   []   []   Cataracts   []   [] Arthritis   []   []   Glasses   []   []      Blurred Vision   []   [] Cancer   []   []   Hearing Aids   []   [] Type:     Ringing in Ears   []   []      Difficulty Swallowing   []   [] Encodrine YES NO      Diabetes   []   []   Neurological YES NO Thyroid   []   []   Stroke   []   []      Seizure   []   [] Psychiatric Disorder YES NO   Dizziness/Blackouts/Fainting   []   [] Depression   [x]   []   Memory Impairement   []   [] Bipolar   [x]   []   Parkinson's   []   [] Anxiety disorder   [x]   []           Genitourinary/Gyn YES NO Skin Intact   [x]   []   Urinary Infection   []   []      Stones   []   [] Sleep   YES NO   Kidney Disease   []   [] Excessive daytime sleepiness   [x]   []   Incontinent   []   [] Snoring   [x]   []   Irregular menstrual cycles   []   [] Unrefreshed sleep   [x]   []   Possibly Pregnant? []     [] Other:      Date of LMP:   Preferred location:            PRESENT ILLNESS:     Weight Parameters  Weight (!) 326 lb (147.9 kg)   Height 5' 8\" (1.727 m)   BMI Body mass index is 49.57 kg/m².    IBW     EBW               IMMUNIZATION STATUS  Immunization History   Administered Date(s) Administered    Influenza Vaccine, unspecified formulation 11/25/2008    Influenza Virus Vaccine 10/24/2014  Influenza, Quadv, IM, (6 mo and older Fluzone, Flulaval, Fluarix and 3 yrs and older Afluria) 11/19/2018    Pneumococcal Polysaccharide ([id number]) 07/11/2016       FALLS ASSESSMENT    [] LOW RISK FOR FALLS    [] MODERATE RISK FOR FALLS    [] Difficulty walking/selfcare    [] Falls in the past 2 months    [] Suspicion of Clinician    [] Other:      SMOKING CESSATION     [] Not needed     [] Instructed to stop smoking    [] Pamphlet community resources given     VTE SCREEN    [] Family hx DVT/PE  /   [] Personal hx of DVT/PE    [x] Denies any family or personal hx of DVT/PE    Physician Review    [x] Past medical, family, & social history reviewed and discussed with patient. Review of surgery and post-surgical changes (by surgeon for surgical patients only)    [x] Lifelong diet expectations reviewed with patient    [x] Need for lifelong vitamin supplementation reviewed with patient    PHYSICAL EXAMINATION:      /68   Pulse 92   Temp 97.3 °F (36.3 °C)   Ht 5' 8\" (1.727 m)   Wt (!) 326 lb (147.9 kg)   LMP 02/20/2019 (Approximate)   BMI 49.57 kg/m²     Constitutional:  Vital signs are normal. The patient appears well-developed   HEENT:      Head: Normocephalic. Atraumatic     Eyes: pupils are equal and reactive. No scleral icterus is present. Neck: No mass and no thyromegaly present. Cardiovascular: Normal rate, regular rhythm, S1 normal and S2 normal.  Bilateral pulses present. Pulmonary/Chest: Effort normal and breath sounds normal. No retractions. Abdominal: Soft. Normal appearance. There is no organomegaly. No tenderness. There is no rigidity, no rebound, no guarding and no Modi's sign. Musculoskeletal:      Right lower leg: Normal. No tenderness and no edema. Left lower leg: Normal. No tenderness and no edema. Lymphadenopathy:     No cervical adenopathy, No Exrtemity Adenopathy. Neurological: The patient is alert and oriented.  Moving all four extremities equally, Baseline Diagnostic Sleep Study    Amb External Referral To Psychology    FL UGI W AIR CONTRAST          Initial Testing     Primary Procedure: Sleeve Gastrectomy     Other Procedures:None    Labwork: Initial Pre-surgical Lab Tests (CMP, TSH, Fasting Lipid Profile, Mg, Zinc, Vit B1 (whole blood), Vit B12, 25-OH Vit D, Fe,  Ferritin,  Folate), Urine drug and alcohol screen  and Negative serum nicotine prior to submission for pre-auth    Imaging: None    Endoscopic Studies: Upper GI Endoscopy for GERD which has been untreated. Psychological Assessment: Psychological Evaluation and Clearance    Nutrition Assessment: Bariatric Nutrition Assessment and Clearance    Cardiology Risk Stratification:  Cardiology consult for clearance    Pulmonary Evaluation: Obstructive Sleep Apnea Evaluation, Pulmonary Clearance, PFT Screen, Copy of Previous Sleep Study and CPAP Settings    Other  Consultations: medical clearance    Physician Supervised Diet and Exercise required by the patients insurance company: 6 months.       Surgical Diet requirement:  2 weeks    Final Testing  Screening Chest Xray  and EKG within 6 months of date of surgery    Labwork:  Final Lab Tests  within 3 months of date of surgery (CBC, PT/PTT, BMP)     Electronically signed by Llye Belcher DO on 11/22/2020 at 1:52 PM

## 2020-11-16 ENCOUNTER — TELEPHONE (OUTPATIENT)
Dept: BARIATRICS/WEIGHT MGMT | Age: 50
End: 2020-11-16

## 2020-11-16 NOTE — TELEPHONE ENCOUNTER
Requesting different psychologist as cannot get scheduled until June; pt directed to list of psychologists; fritz has advised her not to pay program fee; offered practice manager phone number pt refused; requesting practice  her back

## 2020-11-24 NOTE — TELEPHONE ENCOUNTER
Patient called office wanting to talk to someone in billing, asked if we could transfer, advised we could not transfer but provide her with the number , she declined and stated she needs to discuss the [facility] fee and coding. Patient expressed frustrations during the call, regarding fee and stating she was advised by scott she did not have to pay this and was advised to have it billed as an E code. Advised patient the [facility] [employer] is for our department and is for nonbillable services. She expressed her frustrations again, I asked if she wanted to be transferred to the practice manager's voicemail, she was agreeable and transferred.

## 2020-11-25 NOTE — TELEPHONE ENCOUNTER
I already discussed this with the patient on 11/16/2020, and explained this is nonbillable. She has a signed patient agreement from 11/12/2020. She left me a voicemail advising me that scott told her to have us bill E codes and to call her back with any questions. I do not have any questions at this time to call patient back.

## 2020-11-27 NOTE — TELEPHONE ENCOUNTER
Patient returned call at 8:35am, stated she received a miss call, advised patient I left her a vm, as discussed on 11/16 regarding PG fee , $100 would be due on 12/8 appointment, patient asked to reschedule her appt to the end of January as she mentioned she is on disability and that would work better. Appointment rescheduled and patient stated she will call [employer] again .

## 2020-12-08 ENCOUNTER — TELEPHONE (OUTPATIENT)
Dept: BARIATRICS/WEIGHT MGMT | Age: 50
End: 2020-12-08

## 2020-12-30 RX ORDER — ALBUTEROL SULFATE 90 UG/1
1 AEROSOL, METERED RESPIRATORY (INHALATION) EVERY 4 HOURS PRN
Qty: 18 G | Refills: 0 | Status: SHIPPED | OUTPATIENT
Start: 2020-12-30 | End: 2021-01-08 | Stop reason: SDUPTHER

## 2020-12-30 NOTE — TELEPHONE ENCOUNTER
Patient called being\" rudely\" requesting a medication refill be sent to pharmacy for Albuterol Inhaler, patient was informed that previous request was denied due to appt, needed.  Patient demanded to be seen,VV today or tomorrow for refill,was referred to walk in clinic for temp prescription and patient refused due to Covid, the she threatened to gage [facility] stating if she\" dies\" from not getting the refill ,it is not her fault, pls advise Patient was offered office visit and declined

## 2021-01-08 ENCOUNTER — TELEMEDICINE (OUTPATIENT)
Dept: PRIMARY CARE CLINIC | Age: 51
End: 2021-01-08
Payer: COMMERCIAL

## 2021-01-08 DIAGNOSIS — J44.1 COPD EXACERBATION (HCC): Primary | ICD-10-CM

## 2021-01-08 PROCEDURE — 3017F COLORECTAL CA SCREEN DOC REV: CPT | Performed by: NURSE PRACTITIONER

## 2021-01-08 PROCEDURE — 99213 OFFICE O/P EST LOW 20 MIN: CPT | Performed by: NURSE PRACTITIONER

## 2021-01-08 PROCEDURE — G8427 DOCREV CUR MEDS BY ELIG CLIN: HCPCS | Performed by: NURSE PRACTITIONER

## 2021-01-08 PROCEDURE — G8417 CALC BMI ABV UP PARAM F/U: HCPCS | Performed by: NURSE PRACTITIONER

## 2021-01-08 PROCEDURE — 1036F TOBACCO NON-USER: CPT | Performed by: NURSE PRACTITIONER

## 2021-01-08 PROCEDURE — G8484 FLU IMMUNIZE NO ADMIN: HCPCS | Performed by: NURSE PRACTITIONER

## 2021-01-08 PROCEDURE — 3023F SPIROM DOC REV: CPT | Performed by: NURSE PRACTITIONER

## 2021-01-08 PROCEDURE — G8926 SPIRO NO PERF OR DOC: HCPCS | Performed by: NURSE PRACTITIONER

## 2021-01-08 RX ORDER — AZITHROMYCIN 250 MG/1
250 TABLET, FILM COATED ORAL SEE ADMIN INSTRUCTIONS
Qty: 6 TABLET | Refills: 0 | Status: SHIPPED | OUTPATIENT
Start: 2021-01-08 | End: 2021-01-13

## 2021-01-08 RX ORDER — ALBUTEROL SULFATE 90 UG/1
1 AEROSOL, METERED RESPIRATORY (INHALATION) EVERY 4 HOURS PRN
Qty: 18 G | Refills: 1 | Status: SHIPPED | OUTPATIENT
Start: 2021-01-08 | End: 2021-03-07 | Stop reason: SDUPTHER

## 2021-01-08 RX ORDER — PREDNISONE 10 MG/1
TABLET ORAL
Qty: 30 TABLET | Refills: 0 | Status: SHIPPED | OUTPATIENT
Start: 2021-01-08 | End: 2021-02-05 | Stop reason: SDUPTHER

## 2021-01-08 ASSESSMENT — ENCOUNTER SYMPTOMS
WHEEZING: 1
TROUBLE SWALLOWING: 0
HEMOPTYSIS: 0
COUGH: 1
SHORTNESS OF BREATH: 1
SPUTUM PRODUCTION: 1

## 2021-01-08 ASSESSMENT — COPD QUESTIONNAIRES: COPD: 1

## 2021-01-08 NOTE — PROGRESS NOTES
Wilder Vincent is a 48 y.o. female evaluated virtual visit via [address] on 2021. Consent:  She and/or health care decision maker is aware that that she may receive a bill for this telephone service, depending on her insurance coverage, and has provided verbal consent to proceed: Yes      Documentation:  I communicated with the patient and/or health care decision maker about COPD. Details of this discussion including any medical advice provided below      I affirm this is a Patient Initiated Episode with an Established Patient who has not had a related appointment within my department in the past 7 days or scheduled within the next 24 hours. Total Time: minutes: 21-30 minutes    Note: not billable if this call serves to triage the patient into an appointment for the relevant concern      [employer]                  2021    TELEHEALTH EVALUATION -- Audio/Visual (During [id number] public health emergency)    Patient and physician are located in their individual homes    HPI:    Wilder Vincent (:  1970) has requested an audio only/video evaluation for the following concern(s): Worsening breathing, using inhaler multiple times a day  + cough with green phlegm and sinus drainage  Going on since Monday  + wheezing and more SOB    COPD  She complains of cough, shortness of breath, sputum production and wheezing. There is no hemoptysis. This is a chronic problem. The problem has been gradually worsening. The cough is productive of purulent sputum. Associated symptoms include dyspnea on exertion. Pertinent negatives include no chest pain, ear pain, fever, nasal congestion, sneezing or trouble swallowing. Her symptoms are alleviated by beta-agonist. She reports moderate improvement on treatment. Her past medical history is significant for COPD. Review of Systems   Constitutional: Negative for fever. HENT: Negative for ear pain, sneezing and trouble swallowing.     Respiratory: Positive for cough, sputum production, shortness of breath and wheezing. Negative for hemoptysis. Cardiovascular: Positive for dyspnea on exertion. Negative for chest pain. Prior to Visit Medications    Medication Sig Taking? Authorizing Provider   predniSONE (DELTASONE) 10 MG tablet 4 pills for 3 days, then 3 pills for 3 days, then 2 pills for 3 days then 1 pill for 3 days. Yes Forrestine Shallow, APRN - CNP   azithromycin (ZITHROMAX) 250 MG tablet Take 1 tablet by mouth See Admin Instructions for 5 days 500mg on day 1 followed by 250mg on days 2 - 5 Yes Forrestine Shallow, APRN - CNP   albuterol sulfate  (90 Base) MCG/ACT inhaler Inhale 1 puff into the lungs every 4 hours as needed for Wheezing Yes Forrestine Shallow, APRN - CNP   gabapentin (NEURONTIN) 600 MG tablet Take 600 mg by mouth 3 times daily. Yes Historical Provider, MD   traMADol (ULTRAM) 50 MG tablet Take 50 mg by mouth every 6 hours. Yes Historical Provider, MD   baclofen (LIORESAL) 10 MG tablet Take 1 tablet by mouth 3 times daily Yes Ju Hollis MD   prazosin (MINIPRESS) 2 MG capsule take 1 capsule by mouth at bedtime Yes Historical Provider, MD   VITAMIN D HIGH POTENCY 25 MCG (1000 UT) CAPS take 2 capsules by mouth once daily Yes Historical Provider, MD   albuterol (PROVENTIL) (2.5 MG/3ML) 0.083% nebulizer solution Take 3 mLs by nebulization every 4 hours as needed for Wheezing Yes Forrestine Shallow, APRN - CNP   venlafaxine (EFFEXOR XR) 150 MG extended release capsule  Yes Historical Provider, MD   lithium 600 MG capsule  Yes Historical Provider, MD   lithium 300 MG tablet Take 300 mg by mouth 2 times daily 450 mg am 900 mg pm  Yes Historical Provider, MD   ALPRAZolam (XANAX) 0.5 MG tablet Take 0.5 mg by mouth nightly as needed for Sleep. Yes Historical Provider, MD   gabapentin (NEURONTIN) 600 MG tablet Take 1 tablet by mouth 3 times daily for 90 days.   Ju Hollis MD   pregabalin (LYRICA) 75 MG capsule Take 1 capsule by mouth 2 times daily for 14 days. Take 1 capsule by mouth for 14 days  Angel Abarca MD   pregabalin (LYRICA) 150 MG capsule Take 1 capsule by mouth 2 times daily for 30 days.   Angel Abarca MD   Incontinence Supplies MISC 1 Units by Does not apply route 5 times daily  LAWRENCE Morgan - CNP       Social History     Tobacco Use    Smoking status: Former Smoker     Packs/day: 2.00     Years: 20.00     Pack years: 40.00     Types: Cigarettes     Quit date: 1/10/2010     Years since quittin.0    Smokeless tobacco: Never Used   Substance Use Topics    Alcohol use: Yes     Frequency: Monthly or less     Binge frequency: Less than monthly     Comment: rare    Drug use: No        Past Medical History:   Diagnosis Date    Acute exacerbation of COPD with asthma ([facility] [address] 16    Anxiety     Asthma     Benign essential HTN     Bipolar 1 disorder ([facility] [address]     COPD (chronic obstructive pulmonary disease) ([facility] [address]     Depression     Fibromyalgia     Headache(784.0)     Hip arthritis 2019    Obesity     Pneumonia due to infectious organism 2016    Post traumatic stress disorder         Allergies   Allergen Reactions    Food Anaphylaxis     raisins    Penicillins Anaphylaxis    Parafon Forte Dsc [Chlorzoxazone] Hives    Biaxin [Clarithromycin] Rash   ,   Past Medical History:   Diagnosis Date    Acute exacerbation of COPD with asthma ([facility] [address] 16    Anxiety     Asthma     Benign essential HTN     Bipolar 1 disorder ([facility] [address]     [facility] ([facility]     Depression     Fibromyalgia     Headache(784.0)     Hip arthritis 2019    Obesity     Pneumonia due to infectious organism 2016    Post traumatic stress disorder    ,   Past Surgical History:   Procedure Laterality Date    ANESTHESIA NERVE BLOCK N/A 2020    NERVE BLOCK BILATERAL - KNEE performed by Angel Abarca MD at [address] [place] [address] Bilateral 2019    INJECTION MEDICATION - HIP performed by Caroline Agosto MD at [address] Bilateral 2020    knees     TONSILLECTOMY     ,   Social History     Tobacco Use    Smoking status: Former Smoker     Packs/day: 2.00     Years: 20.00     Pack years: 40.00     Types: Cigarettes     Quit date: 1/10/2010     Years since quittin.0    Smokeless tobacco: Never Used   Substance Use Topics    Alcohol use: Yes     Frequency: Monthly or less     Binge frequency: Less than monthly     Comment: rare    Drug use: No       CBC:  Lab Results   Component Value Date    WBC 8.8 2020    HGB 14.4 2020     2020       BMP:    Lab Results   Component Value Date     2019    K 3.7 2019     2019    CO2 24 2019    BUN 8 2020    CREATININE 0.60 2020    GLUCOSE 117 2019       HEMOGLOBIN A1C:   Lab Results   Component Value Date    LABA1C 5.2 2018       FASTING LIPID PANEL:  Lab Results   Component Value Date    CHOL 172 2019    HDL 52 2019    TRIG 294 2019         RECORD REVIEW: Previous medical records were reviewed at today's visit. PHYSICAL EXAMINATION:    Vital Signs: (As obtained by patient/caregiver at home)    No flowsheet data found. Physical Exam  Constitutional:       Appearance: Normal appearance. She is not toxic-appearing. HENT:      Head: Normocephalic. Right Ear: External ear normal.      Left Ear: External ear normal.      Nose: Nose normal.      Mouth/Throat:      Mouth: Mucous membranes are moist.   Eyes:      General: No scleral icterus. Right eye: No discharge. Left eye: No discharge. Conjunctiva/sclera: Conjunctivae normal.   Neck:      Musculoskeletal: Normal range of motion. Pulmonary:      Effort: Pulmonary effort is normal.      Comments: Intermittent dry cough  Skin:     Coloration: Skin is not jaundiced.    Neurological: Mental Status: She is alert and oriented to person, place, and time. Psychiatric:         Mood and Affect: Mood normal.         Behavior: Behavior normal.         Thought Content: Thought content normal.               RECORD REVIEW: Previous medical records were reviewed at today's visit. The past family, medical and social histories were reviewed and unchanged with the exceptions of what is mentioned in this note. Due to this being a TeleHealth encounter, evaluation of the following organ systems is limited: Vitals/Constitutional/EENT/Resp/CV/GI//MS/Neuro/Skin/Heme-Lymph-Imm. ASSESSMENT/PLAN:  Diagnoses and all orders for this visit:    COPD exacerbation ([facility] [address]  -     predniSONE (DELTASONE) 10 MG tablet; 4 pills for 3 days, then 3 pills for 3 days, then 2 pills for 3 days then 1 pill for 3 days. -     azithromycin (ZITHROMAX) 250 MG tablet; Take 1 tablet by mouth See Admin Instructions for 5 days 500mg on day 1 followed by 250mg on days 2 - 5  -     albuterol sulfate  (90 Base) MCG/ACT inhaler; Inhale 1 puff into the lungs every 4 hours as needed for Wheezing    Treatment as above. Follow-up in roughly 3 weeks to further evaluate COPD, consider addition of combination ICS-LABA. Return in about 3 weeks (around 1/29/2021) for copd. An  electronic signature was used to authenticate this note. --LAWRENCE Schaeffer - CNP on 1/8/2021 at 3:03 PM    This note is created with the assistance of a speech-recognition program. While intending to generate a document that actually reflects the content of the visit, the document can still have some mistakes which may not have been identified and corrected by editing. 9    Pursuant to the emergency declaration under the [facility] waiver authority and the [employer] Act, this Virtual  Visit was conducted, with patient's consent, to reduce the patient's risk of exposure to COVID-19 and provide continuity of care for an established patient. Services were provided through a video synchronous discussion virtually to substitute for in-person clinic visit.

## 2021-02-05 ENCOUNTER — TELEMEDICINE (OUTPATIENT)
Dept: PRIMARY CARE CLINIC | Age: 51
End: 2021-02-05
Payer: MEDICARE

## 2021-02-05 DIAGNOSIS — J44.9 CHRONIC OBSTRUCTIVE PULMONARY DISEASE, UNSPECIFIED COPD TYPE (HCC): Primary | ICD-10-CM

## 2021-02-05 DIAGNOSIS — R19.7 DIARRHEA, UNSPECIFIED TYPE: ICD-10-CM

## 2021-02-05 DIAGNOSIS — J44.1 COPD EXACERBATION (HCC): ICD-10-CM

## 2021-02-05 PROCEDURE — G8926 SPIRO NO PERF OR DOC: HCPCS | Performed by: NURSE PRACTITIONER

## 2021-02-05 PROCEDURE — G8417 CALC BMI ABV UP PARAM F/U: HCPCS | Performed by: NURSE PRACTITIONER

## 2021-02-05 PROCEDURE — G8484 FLU IMMUNIZE NO ADMIN: HCPCS | Performed by: NURSE PRACTITIONER

## 2021-02-05 PROCEDURE — G8427 DOCREV CUR MEDS BY ELIG CLIN: HCPCS | Performed by: NURSE PRACTITIONER

## 2021-02-05 PROCEDURE — 99212 OFFICE O/P EST SF 10 MIN: CPT | Performed by: NURSE PRACTITIONER

## 2021-02-05 PROCEDURE — 1036F TOBACCO NON-USER: CPT | Performed by: NURSE PRACTITIONER

## 2021-02-05 PROCEDURE — 3017F COLORECTAL CA SCREEN DOC REV: CPT | Performed by: NURSE PRACTITIONER

## 2021-02-05 PROCEDURE — 3023F SPIROM DOC REV: CPT | Performed by: NURSE PRACTITIONER

## 2021-02-05 RX ORDER — PREDNISONE 20 MG/1
20 TABLET ORAL DAILY
Qty: 5 TABLET | Refills: 0 | Status: SHIPPED | OUTPATIENT
Start: 2021-02-05 | End: 2021-02-10

## 2021-02-05 RX ORDER — FLUTICASONE FUROATE AND VILANTEROL TRIFENATATE 100; 25 UG/1; UG/1
1 POWDER RESPIRATORY (INHALATION) DAILY
Qty: 30 EACH | Refills: 5 | Status: SHIPPED | OUTPATIENT
Start: 2021-02-05 | End: 2021-03-07 | Stop reason: SDUPTHER

## 2021-02-05 RX ORDER — LOPERAMIDE HYDROCHLORIDE 2 MG/1
2 CAPSULE ORAL 4 TIMES DAILY PRN
Qty: 40 CAPSULE | Refills: 0 | Status: SHIPPED | OUTPATIENT
Start: 2021-02-05 | End: 2021-02-15

## 2021-02-05 ASSESSMENT — ENCOUNTER SYMPTOMS
COUGH: 1
SHORTNESS OF BREATH: 1
ABDOMINAL PAIN: 1
DIARRHEA: 1
VOMITING: 0
WHEEZING: 1
CHEST TIGHTNESS: 0
NAUSEA: 0
TROUBLE SWALLOWING: 0
BLOOD IN STOOL: 0
SINUS PAIN: 0

## 2021-02-05 NOTE — PROGRESS NOTES
Micky Jade is a 48 y.o. female evaluated virtual visit via My chart on 2021. Consent:  She and/or health care decision maker is aware that that she may receive a bill for this telephone service, depending on her insurance coverage, and has provided verbal consent to proceed: NA - Consent obtained within past 12 months      Documentation:  I communicated with the patient and/or health care decision maker about COPD. Details of this discussion including any medical advice provided below      I affirm this is a Patient Initiated Episode with an Established Patient who has not had a related appointment within my department in the past 7 days or scheduled within the next 24 hours. Total Time: minutes: 11-20 minutes    Note: not billable if this call serves to triage the patient into an appointment for the relevant concern      Irmarta Surendra                  2021    TELEHEALTH EVALUATION -- Audio/Visual (During [facility] public health emergency)    Patient and physician are located in their individual homes    HPI:    Micky Jade (:  1970) has requested an audio only/video evaluation for the following concern(s):        Corrine Salguero presents today for routine follow-up for COPD. She states after her last visit, she did have resolution of her COPD exacerbation symptoms. He was back to her baseline until about 1 week ago. In the last week she started having slightly worsening symptoms of her COPD. Corrine Salguero endorses mild worsening shortness of breath with exertion and wheezing when supine. She also has productive thick, yellow sputum. She denies generalized shortness of breath or chest pain. There is no fever or chills. No runny nose or congestion. She is utilizing albuterol inhaler more frequently this last week. She currently does not have a daily inhaler. Uses albuterol inhaler as needed. Also has a history of asthma.   Aside from the worsening breathing, Corrine Salguero does tell me she has had watery diarrhea for the last 6 to 7 days. She is having bowel movements roughly 4-5 times a day. No blood, some mucus. Mild intermittent abdominal pain is also present. No urinary symptoms. Review of Systems   Constitutional: Negative for chills, diaphoresis and fever. HENT: Negative for congestion, ear pain, sinus pain, sneezing and trouble swallowing. Respiratory: Positive for cough, shortness of breath and wheezing. Negative for chest tightness. Cardiovascular: Negative for chest pain. Gastrointestinal: Positive for abdominal pain and diarrhea. Negative for blood in stool, nausea and vomiting. Genitourinary: Negative for difficulty urinating. Neurological: Negative for dizziness and weakness. Prior to Visit Medications    Medication Sig Taking? Authorizing Provider   fluticasone-vilanterol (BREO ELLIPTA) 100-25 MCG/INH AEPB inhaler Inhale 1 puff into the lungs daily Yes LAWRENCE Ospina CNP   predniSONE (DELTASONE) 20 MG tablet Take 1 tablet by mouth daily for 5 days 1 tablet daily Yes LAWRENCE Ospina CNP   loperamide (RA ANTI-DIARRHEAL) 2 MG capsule Take 1 capsule by mouth 4 times daily as needed for Diarrhea Yes LAWRENCE Ospina CNP   albuterol sulfate  (90 Base) MCG/ACT inhaler Inhale 1 puff into the lungs every 4 hours as needed for Wheezing Yes LAWRENCE Ospina CNP   gabapentin (NEURONTIN) 600 MG tablet Take 600 mg by mouth 3 times daily. Yes Historical Provider, MD   traMADol (ULTRAM) 50 MG tablet Take 50 mg by mouth every 6 hours.  Yes Historical Provider, MD   baclofen (LIORESAL) 10 MG tablet Take 1 tablet by mouth 3 times daily Yes Mike Mustafa MD   prazosin (MINIPRESS) 2 MG capsule take 1 capsule by mouth at bedtime Yes Historical Provider, MD   VITAMIN D HIGH POTENCY 25 MCG (1000 UT) CAPS take 2 capsules by mouth once daily Yes Historical Provider, MD   albuterol (PROVENTIL) (2.5 MG/3ML) 0.083% nebulizer solution Take 3 mLs by nebulization every 4 hours as needed for Wheezing Yes LAWRENCE Messina CNP   venlafaxine (EFFEXOR XR) 150 MG extended release capsule  Yes Historical Provider, MD   lithium 600 MG capsule  Yes Historical Provider, MD   lithium 300 MG tablet Take 300 mg by mouth 2 times daily 450 mg am 900 mg pm  Yes Historical Provider, MD   ALPRAZolam Mirandayanira Lopes) 0.5 MG tablet Take 0.5 mg by mouth nightly as needed for Sleep. Yes Historical Provider, MD   gabapentin (NEURONTIN) 600 MG tablet Take 1 tablet by mouth 3 times daily for 90 days. Valerio Hernandez MD   pregabalin (LYRICA) 75 MG capsule Take 1 capsule by mouth 2 times daily for 14 days. Take 1 capsule by mouth for 14 days  Valerio Hernandez MD   pregabalin (LYRICA) 150 MG capsule Take 1 capsule by mouth 2 times daily for 30 days.   Valerio Hernandez MD   Incontinence Supplies MISC 1 Units by Does not apply route 5 times daily  LAWRENCE Messina - CNP       Social History     Tobacco Use    Smoking status: Former Smoker     Packs/day: 2.00     Years: 20.00     Pack years: 40.00     Types: Cigarettes     Quit date: 1/10/2010     Years since quittin.0    Smokeless tobacco: Never Used   Substance Use Topics    Alcohol use: Yes     Frequency: Monthly or less     Binge frequency: Less than monthly     Comment: rare    Drug use: No        Past Medical History:   Diagnosis Date    Acute exacerbation of COPD with asthma ([address] 16    Anxiety     Asthma     Benign essential HTN     Bipolar 1 disorder ([address]     [facility]     Depression     Fibromyalgia     Headache(784.0)     Hip arthritis 2019    Obesity     Pneumonia due to infectious organism 2016    Post traumatic stress disorder         Allergies   Allergen Reactions    Food Anaphylaxis     raisins    Penicillins Anaphylaxis    Parafon Forte Dsc [Chlorzoxazone] Hives    Biaxin [Clarithromycin] Rash   ,   Past Medical History:   Diagnosis Date  Acute exacerbation of COPD with asthma ([facility] 16    Anxiety     Asthma     Benign essential HTN     Bipolar 1 disorder ([facility]     COPD (chronic obstructive pulmonary disease) (HCC)     Depression     Fibromyalgia     Headache(784.0)     Hip arthritis 2019    Obesity     Pneumonia due to infectious organism 2016    Post traumatic stress disorder    ,   Past Surgical History:   Procedure Laterality Date    ANESTHESIA NERVE BLOCK N/A 2020    NERVE BLOCK BILATERAL - KNEE performed by Mike Mustafa MD at [facility] NERV Bilateral 2019    INJECTION MEDICATION - HIP performed by Mike Mustafa MD at [address] Bilateral 2020    knees     TONSILLECTOMY     ,   Social History     Tobacco Use    Smoking status: Former Smoker     Packs/day: 2.00     Years: 20.00     Pack years: 40.00     Types: Cigarettes     Quit date: 1/10/2010     Years since quittin.0    Smokeless tobacco: Never Used   Substance Use Topics    Alcohol use: Yes     Frequency: Monthly or less     Binge frequency: Less than monthly     Comment: rare    Drug use: No       CBC:  Lab Results   Component Value Date    WBC 8.8 2020    HGB 14.4 2020     2020       BMP:    Lab Results   Component Value Date     2019    K 3.7 2019     2019    CO2 24 2019    BUN 8 2020    CREATININE 0.60 2020    GLUCOSE 117 2019       HEMOGLOBIN A1C:   Lab Results   Component Value Date    LABA1C 5.2 2018       FASTING LIPID PANEL:  Lab Results   Component Value Date    CHOL 172 2019    HDL 52 2019    TRIG 294 2019         RECORD REVIEW: Previous medical records were reviewed at today's visit.     PHYSICAL EXAMINATION:    Vital Signs: (As obtained by patient/caregiver at home)    Patient-Reported Vitals 2021   Patient-Reported Weight 320 Patient-Reported Height 5'7\"          Physical Exam  Constitutional:       Appearance: Normal appearance. She is not toxic-appearing. HENT:      Head: Normocephalic. Right Ear: External ear normal.      Left Ear: External ear normal.      Nose: Nose normal.      Mouth/Throat:      Mouth: Mucous membranes are moist.   Eyes:      General: No scleral icterus. Right eye: No discharge. Left eye: No discharge. Conjunctiva/sclera: Conjunctivae normal.   Neck:      Musculoskeletal: Normal range of motion. Pulmonary:      Effort: Pulmonary effort is normal.      Comments: Intermittent dry cough  Skin:     Coloration: Skin is not jaundiced. Neurological:      Mental Status: She is alert and oriented to person, place, and time. Psychiatric:         Mood and Affect: Mood normal.         Behavior: Behavior normal.         Thought Content: Thought content normal.               RECORD REVIEW: Previous medical records were reviewed at today's visit. The past family, medical and social histories were reviewed and unchanged with the exceptions of what is mentioned in this note. Due to this being a TeleHealth encounter, evaluation of the following organ systems is limited: Vitals/Constitutional/EENT/Resp/CV/GI//MS/Neuro/Skin/Heme-Lymph-Imm. ASSESSMENT/PLAN:  Adama Moseley was seen today for other and cough. Diagnoses and all orders for this visit:    Chronic obstructive pulmonary disease, unspecified COPD type ([address]  -     fluticasone-vilanterol (BREO ELLIPTA) 100-25 MCG/INH AEPB inhaler; Inhale 1 puff into the lungs daily    COPD exacerbation (HCC)  -     predniSONE (DELTASONE) 20 MG tablet; Take 1 tablet by mouth daily for 5 days 1 tablet daily    Diarrhea, unspecified type  -     loperamide (RA ANTI-DIARRHEAL) 2 MG capsule; Take 1 capsule by mouth 4 times daily as needed for Diarrhea    -Possible mild COPD exacerbation today. Lower in symptom severity compared to 3 weeks visit prior.   Do not believe antibiotics are necessary today, however patient was advised to follow-up in 2 to 3 days if she is worsening despite current prescribed treatment. -Given worsening COPD, will initiate daily inhaler. She also has asthma, will start with combination LABA-ICS. Patient has utilized Symbicort in the past, states she did have thrush despite rinsing mouth. Will attempt Breo inhaler    No follow-ups on file. An  electronic signature was used to authenticate this note. --LAWRENCE Morgan - CNP on 2/5/2021 at 1:46 PM    This note is created with the assistance of a speech-recognition program. While intending to generate a document that actually reflects the content of the visit, the document can still have some mistakes which may not have been identified and corrected by editing. 9    Pursuant to the emergency declaration under the [facility] waiver authority and the "[employer] and [employer] Act, this Virtual  Visit was conducted, with patient's consent, to reduce the patient's risk of exposure to COVID-19 and provide continuity of care for an established patient. Services were provided through a video synchronous discussion virtually to substitute for in-person clinic visit.

## 2021-02-05 NOTE — PROGRESS NOTES
Visit Information    Have you changed or started any medications since your last visit including any over-the-counter medicines, vitamins, or herbal medicines? no   Are you having any side effects from any of your medications? -  no  Have you stopped taking any of your medications? Is so, why? -  no    Have you seen any other physician or provider since your last visit? Yes - Records Obtained  Have you had any other diagnostic tests since your last visit? No  Have you been seen in the emergency room and/or had an admission to a hospital since we last saw you? No  Have you had your routine dental cleaning in the past 6 months? no    Have you activated your [employer] account? If not, what are your barriers?  Yes     Patient Care Team:  LAWRENCE Ge CNP as PCP - General (Family Medicine)  LAWRENCE Ge CNP as PCP - [facility] Provider    Medical History Review  Past Medical, Family, and Social History reviewed and does not contribute to the patient presenting condition    Health Maintenance   Topic Date Due    Hepatitis C screen  1970    HIV screen  09/25/1985    Cervical cancer screen  09/25/1991    Potassium monitoring  06/03/2020    Flu vaccine (1) 09/01/2020    Breast cancer screen  09/25/2020    Shingles Vaccine (1 of 2) 09/25/2020    Colon cancer screen colonoscopy  09/25/2020    Annual Wellness Visit (AWV)  02/02/2021    Diabetes screen  08/06/2021    Creatinine monitoring  09/28/2021    Lipid screen  04/11/2024    DTaP/Tdap/Td vaccine (2 - Td) 08/18/2027    Pneumococcal 0-64 years Vaccine  Completed    Hepatitis A vaccine  Aged Out    Hepatitis B vaccine  Aged Out    Hib vaccine  Aged Out    Meningococcal (ACWY) vaccine  Aged Out

## 2021-02-11 ENCOUNTER — PATIENT MESSAGE (OUTPATIENT)
Dept: PRIMARY CARE CLINIC | Age: 51
End: 2021-02-11

## 2021-02-11 DIAGNOSIS — B00.9 HERPES SIMPLEX TYPE 1 INFECTION: Primary | ICD-10-CM

## 2021-02-11 NOTE — TELEPHONE ENCOUNTER
From: Velia Comer  To: Sherley Jang, APRN - CNP  Sent: 2/11/2021 1:06 PM EST  Subject: Non-Urgent Medical Question    I have had a tingling feeling of a cold sore forming on my upper lip. I used Abreva & Zilactin. They delayed the blister that formed overnight. I have attached the picture of it. I have not had one in years like this. I usually given a cream and oral meditation. Maryann Ovens not know the names. Please let me know if you can send in a medication to [facility] on [facility] [address]. If there is any questions call me at [phone].     Thank you,  Kwasi Deleon

## 2021-02-12 RX ORDER — VALACYCLOVIR HYDROCHLORIDE 1 G/1
2000 TABLET, FILM COATED ORAL ONCE
Qty: 2 TABLET | Refills: 0 | Status: SHIPPED | OUTPATIENT
Start: 2021-02-12 | End: 2021-02-12

## 2021-02-19 ENCOUNTER — HOSPITAL ENCOUNTER (OUTPATIENT)
Dept: LAB | Age: 51
Setting detail: SPECIMEN
Discharge: HOME | End: 2021-02-19
Payer: MEDICARE

## 2021-02-19 DIAGNOSIS — Z01.818 PREOP TESTING: Primary | ICD-10-CM

## 2021-03-07 DIAGNOSIS — J44.1 COPD EXACERBATION (HCC): ICD-10-CM

## 2021-03-07 DIAGNOSIS — J44.9 CHRONIC OBSTRUCTIVE PULMONARY DISEASE, UNSPECIFIED COPD TYPE (HCC): ICD-10-CM

## 2021-03-08 RX ORDER — ALBUTEROL SULFATE 90 UG/1
1 AEROSOL, METERED RESPIRATORY (INHALATION) EVERY 4 HOURS PRN
Qty: 18 G | Refills: 1 | Status: SHIPPED | OUTPATIENT
Start: 2021-03-08 | End: 2021-05-13

## 2021-03-08 RX ORDER — FLUTICASONE FUROATE AND VILANTEROL TRIFENATATE 100; 25 UG/1; UG/1
1 POWDER RESPIRATORY (INHALATION) DAILY
Qty: 30 EACH | Refills: 5 | Status: SHIPPED | OUTPATIENT
Start: 2021-03-08 | End: 2021-07-26 | Stop reason: SDUPTHER

## 2021-03-08 NOTE — TELEPHONE ENCOUNTER
Health Maintenance   Topic Date Due    Hepatitis C screen  Never done    HIV screen  Never done    Cervical cancer screen  Never done    Potassium monitoring  06/03/2020    Flu vaccine (1) 09/01/2020    Breast cancer screen  Never done    Shingles Vaccine (1 of 2) Never done    Colon cancer screen colonoscopy  Never done    Annual Wellness Visit (AWV)  Never done    Diabetes screen  08/06/2021    Creatinine monitoring  09/28/2021    Lipid screen  04/11/2024    DTaP/Tdap/Td vaccine (2 - Td) 08/18/2027    Pneumococcal 0-64 years Vaccine  Completed    Hepatitis A vaccine  Aged Out    Hepatitis B vaccine  Aged Out    Hib vaccine  Aged Out    Meningococcal (ACWY) vaccine  Aged Out             (applicable per patient's age: Cancer Screenings, Depression Screening, Fall Risk Screening, Immunizations)    Hemoglobin A1C (%)   Date Value   08/06/2018 5.2   10/24/2014 5.4   05/03/2013 5.7     LDL Cholesterol (mg/dL)   Date Value   05/02/2013 111 (H)     LDL Calculated (mg/dL)   Date Value   04/11/2019 61     AST (U/L)   Date Value   06/03/2019 14     ALT (U/L)   Date Value   06/03/2019 11     BUN (mg/dL)   Date Value   09/28/2020 8      (goal A1C is < 7)   (goal LDL is <100) need 30-50% reduction from baseline     BP Readings from Last 3 Encounters:   11/12/20 110/68   06/25/20 119/71   01/23/20 134/79    (goal /80)      All Future Testing planned in [facility]:  Lab Frequency Next Occurrence   Baseline Diagnostic Sleep Study Once 11/12/2020   FL UGI W AIR CONTRAST Once 04/03/2021   Full PFT Study With Bronchodilator Once 02/16/2021       Next Visit Date:  Future Appointments   Date Time Provider Natalie Goldman   8/16/2021  2:00 PM STA SCR MAMMO RM 2 STAZ MAMMO STA Radiolog            Patient Active Problem List:     COPD (chronic obstructive pulmonary disease) ([facility]     Acute exacerbation of chronic obstructive pulmonary disease (COPD) ([facility]     Multiple pulmonary nodules     Morbid obesity with BMI of 50.0-59.9, adult ([facility]     Leukocytosis     Asthma     Bipolar 1 disorder (HCC)     Anxiety     COPD exacerbation (HCC)     Benign essential HTN     Depression with anxiety     Abnormal TSH     Chronic bilateral low back pain with bilateral sciatica     Fibromyalgia     Hip arthritis     Hip pain, bilateral     Chronic pain of both knees

## 2021-03-22 ENCOUNTER — TELEPHONE (OUTPATIENT)
Dept: PRIMARY CARE CLINIC | Age: 51
End: 2021-03-22

## 2021-03-22 NOTE — TELEPHONE ENCOUNTER
Patient called stating she is still having severe diarrhea. She was given loperamide (RA ANTI-DIARRHEAL) 2 MG but that is not helping. She would suma to know what should she do know.

## 2021-03-22 NOTE — TELEPHONE ENCOUNTER
I did provide a refill of medication about 6 weeks ago. Please have Angel Edward verify how frequently she is having episodes of diarrhea. If she has further questions, we can set up a virtual visit to discuss symptoms and next steps.

## 2021-03-23 ENCOUNTER — VIRTUAL VISIT (OUTPATIENT)
Dept: PRIMARY CARE CLINIC | Age: 51
End: 2021-03-23
Payer: MEDICARE

## 2021-03-23 ENCOUNTER — TELEPHONE (OUTPATIENT)
Dept: GASTROENTEROLOGY | Age: 51
End: 2021-03-23

## 2021-03-23 DIAGNOSIS — R19.7 DIARRHEA IN ADULT PATIENT: Primary | ICD-10-CM

## 2021-03-23 PROCEDURE — 99213 OFFICE O/P EST LOW 20 MIN: CPT | Performed by: NURSE PRACTITIONER

## 2021-03-23 PROCEDURE — 3017F COLORECTAL CA SCREEN DOC REV: CPT | Performed by: NURSE PRACTITIONER

## 2021-03-23 PROCEDURE — G8427 DOCREV CUR MEDS BY ELIG CLIN: HCPCS | Performed by: NURSE PRACTITIONER

## 2021-03-23 SDOH — ECONOMIC STABILITY: INCOME INSECURITY: HOW HARD IS IT FOR YOU TO PAY FOR THE VERY BASICS LIKE FOOD, HOUSING, MEDICAL CARE, AND HEATING?: NOT HARD AT ALL

## 2021-03-23 SDOH — ECONOMIC STABILITY: FOOD INSECURITY: WITHIN THE PAST 12 MONTHS, YOU WORRIED THAT YOUR FOOD WOULD RUN OUT BEFORE YOU GOT MONEY TO BUY MORE.: NEVER TRUE

## 2021-03-23 ASSESSMENT — ENCOUNTER SYMPTOMS
DIARRHEA: 1
BLOOD IN STOOL: 0
VOMITING: 0
NAUSEA: 1
CONSTIPATION: 0
BLOATING: 0
ABDOMINAL PAIN: 1

## 2021-03-23 NOTE — PROGRESS NOTES
drug and change of diet for the symptoms. There is no history of bowel resection, inflammatory bowel disease, a recent abdominal surgery or short gut syndrome. Review of Systems   Constitutional: Positive for unexpected weight change and weight loss. Negative for fever. Gastrointestinal: Positive for abdominal pain, diarrhea and nausea. Negative for bloating, blood in stool, constipation and vomiting. Genitourinary: Negative for decreased urine volume. Neurological: Positive for weakness. Negative for dizziness. Prior to Visit Medications    Medication Sig Taking? Authorizing Provider   psyllium (METAMUCIL SMOOTH TEXTURE) 28 % packet Take 1 packet by mouth 2 times daily Take with full glass of h20 or juice Yes LAWRENCE Rivas CNP   albuterol sulfate  (90 Base) MCG/ACT inhaler Inhale 1 puff into the lungs every 4 hours as needed for Wheezing Yes LAWRENCE Rivas CNP   fluticasone-vilanterol (BREO ELLIPTA) 100-25 MCG/INH AEPB inhaler Inhale 1 puff into the lungs daily Yes LAWRENCE Rivas CNP   gabapentin (NEURONTIN) 600 MG tablet Take 600 mg by mouth 3 times daily.  Yes Historical Provider, MD   baclofen (LIORESAL) 10 MG tablet Take 1 tablet by mouth 3 times daily Yes Reji Thibodeaux MD   prazosin (MINIPRESS) 2 MG capsule take 1 capsule by mouth at bedtime Yes Historical Provider, MD   VITAMIN D HIGH POTENCY 25 MCG (1000 UT) CAPS take 2 capsules by mouth once daily Yes Historical Provider, MD   albuterol (PROVENTIL) (2.5 MG/3ML) 0.083% nebulizer solution Take 3 mLs by nebulization every 4 hours as needed for Wheezing Yes LAWRENCE Rivas CNP   venlafaxine (EFFEXOR XR) 150 MG extended release capsule  Yes Historical Provider, MD   lithium 600 MG capsule  Yes Historical Provider, MD   lithium 300 MG tablet Take 300 mg by mouth 2 times daily 450 mg am 900 mg pm  Yes Historical Provider, MD   ALPRAZolam (XANAX) 0.5 MG tablet Take 0.5 mg by mouth nightly as needed for Sleep. Yes Historical Provider, MD   gabapentin (NEURONTIN) 600 MG tablet Take 1 tablet by mouth 3 times daily for 90 days.   Domingo Ackerman MD   Incontinence Supplies MISC 1 Units by Does not apply route 5 times daily  LAWRENCE Oviedo - CNP       Social History     Tobacco Use    Smoking status: Former Smoker     Packs/day: 2.00     Years: 20.00     Pack years: 40.00     Types: Cigarettes     Quit date: 1/10/2010     Years since quittin.2    Smokeless tobacco: Never Used   Substance Use Topics    Alcohol use: Yes     Frequency: Monthly or less     Binge frequency: Less than monthly     Comment: rare    Drug use: No        Past Medical History:   Diagnosis Date    Acute exacerbation of COPD with asthma ([facility] 16    Anxiety     Asthma     Benign essential HTN     Bipolar 1 disorder ([facility]     [facility]     Depression     Fibromyalgia     Headache(784.0)     Hip arthritis 2019    Obesity     Pneumonia due to infectious organism 2016    Post traumatic stress disorder         Allergies   Allergen Reactions    Food Anaphylaxis     raisins    Penicillins Anaphylaxis    Parafon Forte Dsc [Chlorzoxazone] Hives    Biaxin [Clarithromycin] Rash   ,   Past Medical History:   Diagnosis Date    Acute exacerbation of COPD with asthma ([facility] 16    Anxiety     Asthma     Benign essential HTN     Bipolar 1 disorder ([facility]     [facility]ch[facility] disease) [facility]     Depression     Fibromyalgia     Headache(784.0)     Hip arthritis 2019    Obesity     Pneumonia due to infectious organism 2016    Post traumatic stress disorder    ,   Past Surgical History:   Procedure Laterality Date    ANESTHESIA NERVE BLOCK N/A 2020    NERVE BLOCK BILATERAL - KNEE performed by Domingo Ackerman MD at [place] [address] NERV Bilateral 2019    INJECTION MEDICATION - HIP performed by Caroline Agosto MD at [address] Bilateral 2020    knees     TONSILLECTOMY     ,   Social History     Tobacco Use    Smoking status: Former Smoker     Packs/day: 2.00     Years: 20.00     Pack years: 40.00     Types: Cigarettes     Quit date: 1/10/2010     Years since quittin.2    Smokeless tobacco: Never Used   Substance Use Topics    Alcohol use: Yes     Frequency: Monthly or less     Binge frequency: Less than monthly     Comment: rare    Drug use: No       CBC:  Lab Results   Component Value Date    WBC 8.8 2020    HGB 14.4 2020     2020       BMP:    Lab Results   Component Value Date     2019    K 3.7 2019     2019    CO2 24 2019    BUN 8 2020    CREATININE 0.60 2020    GLUCOSE 117 2019       HEMOGLOBIN A1C:   Lab Results   Component Value Date    LABA1C 5.2 2018       FASTING LIPID PANEL:  Lab Results   Component Value Date    CHOL 172 2019    HDL 52 2019    TRIG 294 2019         RECORD REVIEW: Previous medical records were reviewed at today's visit. PHYSICAL EXAMINATION:    Vital Signs: (As obtained by patient/caregiver at home)    Patient-Reported Vitals 2021   Patient-Reported Weight 320   Patient-Reported Height 5'7\"          Physical Exam  Constitutional:       Appearance: Normal appearance. She is not ill-appearing, toxic-appearing or diaphoretic. HENT:      Head: Normocephalic. Right Ear: External ear normal.      Left Ear: External ear normal.      Nose: Nose normal.      Mouth/Throat:      Mouth: Mucous membranes are moist.   Eyes:      General: No scleral icterus. Right eye: No discharge. Left eye: No discharge. Conjunctiva/sclera: Conjunctivae normal.   Neck:      Musculoskeletal: Normal range of motion. Pulmonary:      Effort: Pulmonary effort is normal.   Skin:     Coloration: Skin is not jaundiced. Neurological:      Mental Status: She is alert and oriented to person, place, and time. Psychiatric:         Mood and Affect: Mood normal.         Behavior: Behavior normal.         Thought Content: Thought content normal.             RECORD REVIEW: Previous medical records were reviewed at today's visit. The past family, medical and social histories were reviewed and unchanged with the exceptions of what is mentioned in this note. Due to this being a TeleHealth encounter, evaluation of the following organ systems is limited: Vitals/Constitutional/EENT/Resp/CV/GI//MS/Neuro/Skin/Heme-Lymph-Imm. ASSESSMENT/PLAN:  Luis Fernando Weaver was seen today for diarrhea. Diagnoses and all orders for this visit:    Diarrhea in adult patient  -     Clostridium Difficile Toxin/Antigen; Future  -     Stool For Wbc #1; Future  -     Giardia / Cryptosporidum Antigens, DFA; Future  -     O&P Panel (Travel Associated) #1; Future  -     Culture, Aerobic; Future  -     Ravin Colin MD, Gastroenterology, [facility]    Other orders  -     psyllium (METAMUCIL SMOOTH TEXTURE) 28 % packet; Take 1 packet by mouth 2 times daily Take with full glass of h20 or juice    Low suspicion for diverticulitis or constipation. Gallbladder removal in 2003, no hx of GI surgery or IDB. Stool studies as above. Encourage clear liquid diet, no dairy for the next 2 to 3 days. He attempted to advance to brat diet after 2 to 3 days. Metamucil ordered as bulking agent. No follow-ups on file. An  electronic signature was used to authenticate this note. --LAWRENCE Hanley - CNP on 3/23/2021 at 2:34 PM    This note is created with the assistance of a speech-recognition program. While intending to generate a document that actually reflects the content of the visit, the document can still have some mistakes which may not have been identified and corrected by editing. 9    Pursuant to the emergency declaration under the [address] and the [employer] Act, [facility] waiver authority and the Coronavirus Preparedness and [employer] Act, this Virtual  Visit was conducted, with patient's consent, to reduce the patient's risk of exposure to COVID-19 and provide continuity of care for an established patient. Services were provided through a video synchronous discussion virtually to substitute for in-person clinic visit. No follow-ups on file. An electronic signature was used to authenticate this note. --LAWRENCE Abdalla CNP on 3/23/2021 at 2:34 PM  Visit Information    Have you changed or started any medications since your last visit including any over-the-counter medicines, vitamins, or herbal medicines? no   Are you having any side effects from any of your medications? -  no  Have you stopped taking any of your medications? Is so, why? -  no    Have you seen any other physician or provider since your last visit? No  Have you had any other diagnostic tests since your last visit? No  Have you been seen in the emergency room and/or had an admission to a hospital since we last saw you? No  Have you had your routine dental cleaning in the past 6 months? no    Have you activated your [employer] account? If not, what are your barriers?  Yes     Patient Care Team:  LAWRENCE Abdalla CNP as PCP - General (Family Medicine)  LAWRENCE Abdalla CNP as PCP - [facility]    Medical History Review  Past Medical, Family, and Social History reviewed and does not contribute to the patient presenting condition    Health Maintenance   Topic Date Due    Hepatitis C screen  Never done    HIV screen  Never done    COVID-19 Vaccine (1) Never done    Cervical cancer screen  Never done    Potassium monitoring  06/03/2020    Flu vaccine (1) 09/01/2020    Breast cancer screen  Never done    Shingles Vaccine (1 of 2) Never done    Colon cancer screen colonoscopy  Never done   Polly Cushing Annual Wellness Visit (AWV)  Never done    Diabetes screen  08/06/2021    Creatinine monitoring  09/28/2021    Lipid screen  04/11/2024    DTaP/Tdap/Td vaccine (2 - Td) 08/18/2027    Pneumococcal 0-64 years Vaccine  Completed    Hepatitis A vaccine  Aged Out    Hepatitis B vaccine  Aged Out    Hib vaccine  Aged Out    Meningococcal (ACWY) vaccine  Aged Out

## 2021-04-08 ENCOUNTER — HOSPITAL ENCOUNTER (OUTPATIENT)
Age: 51
Discharge: HOME | End: 2021-04-08
Payer: MEDICARE

## 2021-04-08 LAB
ANION GAP SERPL CALCULATED.3IONS-SCNC: 11 MMOL/L (ref 9–17)
BUN BLDV-MCNC: 12 MG/DL (ref 6–20)
BUN/CREAT BLD: ABNORMAL (ref 9–20)
CALCIUM SERPL-MCNC: 10.3 MG/DL (ref 8.6–10.4)
CHLORIDE BLD-SCNC: 105 MMOL/L (ref 98–107)
CO2: 25 MMOL/L (ref 20–31)
CREAT SERPL-MCNC: 0.61 MG/DL (ref 0.5–0.9)
GFR AFRICAN AMERICAN: >60 ML/MIN
GFR NON-AFRICAN AMERICAN: >60 ML/MIN
GFR SERPL CREATININE-BSD FRML MDRD: ABNORMAL ML/MIN/{1.73_M2}
GFR SERPL CREATININE-BSD FRML MDRD: ABNORMAL ML/MIN/{1.73_M2}
GLUCOSE BLD-MCNC: 114 MG/DL (ref 70–99)
LITHIUM DATE LAST DOSE: ABNORMAL
LITHIUM DOSE AMOUNT: ABNORMAL
LITHIUM DOSE TIME: ABNORMAL
LITHIUM LEVEL: 0.5 MMOL/L (ref 0.6–1.2)
POTASSIUM SERPL-SCNC: 4.3 MMOL/L (ref 3.7–5.3)
SODIUM BLD-SCNC: 141 MMOL/L (ref 135–144)
T3 FREE: 2.85 PG/ML (ref 2.02–4.43)
THYROXINE, FREE: 1.12 NG/DL (ref 0.93–1.7)
TSH SERPL DL<=0.05 MIU/L-ACNC: 1.42 MIU/L (ref 0.3–5)

## 2021-04-08 PROCEDURE — 84481 FREE ASSAY (FT-3): CPT

## 2021-04-08 PROCEDURE — 80048 BASIC METABOLIC PNL TOTAL CA: CPT

## 2021-04-08 PROCEDURE — 93005 ELECTROCARDIOGRAM TRACING: CPT | Performed by: PSYCHIATRY & NEUROLOGY

## 2021-04-08 PROCEDURE — 36415 COLL VENOUS BLD VENIPUNCTURE: CPT

## 2021-04-08 PROCEDURE — 84439 ASSAY OF FREE THYROXINE: CPT

## 2021-04-08 PROCEDURE — 84443 ASSAY THYROID STIM HORMONE: CPT

## 2021-04-08 PROCEDURE — 80178 ASSAY OF LITHIUM: CPT

## 2021-04-09 LAB
EKG ATRIAL RATE: 89 BPM
EKG P AXIS: 64 DEGREES
EKG P-R INTERVAL: 172 MS
EKG Q-T INTERVAL: 378 MS
EKG QRS DURATION: 88 MS
EKG QTC CALCULATION (BAZETT): 459 MS
EKG R AXIS: 26 DEGREES
EKG T AXIS: 50 DEGREES
EKG VENTRICULAR RATE: 89 BPM

## 2021-04-26 ENCOUNTER — TELEPHONE (OUTPATIENT)
Dept: GASTROENTEROLOGY | Age: 51
End: 2021-04-26

## 2021-04-26 ENCOUNTER — OFFICE VISIT (OUTPATIENT)
Dept: GASTROENTEROLOGY | Age: 51
End: 2021-04-26
Payer: MEDICARE

## 2021-04-26 VITALS
DIASTOLIC BLOOD PRESSURE: 100 MMHG | WEIGHT: 293 LBS | OXYGEN SATURATION: 95 % | BODY MASS INDEX: 49.57 KG/M2 | HEART RATE: 107 BPM | SYSTOLIC BLOOD PRESSURE: 162 MMHG

## 2021-04-26 DIAGNOSIS — E66.3 OVERWEIGHT: ICD-10-CM

## 2021-04-26 DIAGNOSIS — F31.32 BIPOLAR AFFECTIVE DISORDER, CURRENTLY DEPRESSED, MODERATE (HCC): ICD-10-CM

## 2021-04-26 DIAGNOSIS — F41.0 PANIC DISORDER: ICD-10-CM

## 2021-04-26 DIAGNOSIS — R13.10 DYSPHAGIA, UNSPECIFIED TYPE: ICD-10-CM

## 2021-04-26 DIAGNOSIS — K52.9 CHRONIC DIARRHEA: Primary | ICD-10-CM

## 2021-04-26 DIAGNOSIS — K21.9 CHRONIC GERD: ICD-10-CM

## 2021-04-26 DIAGNOSIS — F41.9 ANXIETY: ICD-10-CM

## 2021-04-26 PROCEDURE — G8427 DOCREV CUR MEDS BY ELIG CLIN: HCPCS | Performed by: INTERNAL MEDICINE

## 2021-04-26 PROCEDURE — 1036F TOBACCO NON-USER: CPT | Performed by: INTERNAL MEDICINE

## 2021-04-26 PROCEDURE — G8417 CALC BMI ABV UP PARAM F/U: HCPCS | Performed by: INTERNAL MEDICINE

## 2021-04-26 PROCEDURE — 99205 OFFICE O/P NEW HI 60 MIN: CPT | Performed by: INTERNAL MEDICINE

## 2021-04-26 PROCEDURE — 3017F COLORECTAL CA SCREEN DOC REV: CPT | Performed by: INTERNAL MEDICINE

## 2021-04-26 RX ORDER — POLYETHYLENE GLYCOL 3350 17 G/17G
POWDER, FOR SOLUTION ORAL
Qty: 238 G | Refills: 0 | Status: SHIPPED | OUTPATIENT
Start: 2021-04-26 | End: 2021-06-29

## 2021-04-26 RX ORDER — BISACODYL 5 MG
TABLET, DELAYED RELEASE (ENTERIC COATED) ORAL
Qty: 2 TABLET | Refills: 0 | Status: SHIPPED | OUTPATIENT
Start: 2021-04-26 | End: 2021-06-29

## 2021-04-26 ASSESSMENT — ENCOUNTER SYMPTOMS
DIARRHEA: 1
BLOOD IN STOOL: 0
CONSTIPATION: 1
VOMITING: 1
RECTAL PAIN: 1
ANAL BLEEDING: 0
ABDOMINAL PAIN: 1
NAUSEA: 1
TROUBLE SWALLOWING: 1
BACK PAIN: 1

## 2021-04-26 NOTE — TELEPHONE ENCOUNTER
Pt called stating that she has a new patient appt today and needed help getting directions to find our building. Writer tried to explain the best she could. Pt stated \"if I cant find it I just wont come\" and ended call.

## 2021-04-26 NOTE — PROGRESS NOTES
Reason for Referral:   Bello Rodriguez, APRN - CNP  [address]  [facility],  [address] [place] [address]    Chief Complaint   Patient presents with   Arelis Schwab Established New Doctor     new patient, here to discuss issues with diarrhea       1. Chronic diarrhea    2. Anxiety    3. Panic disorder    4. Bipolar affective disorder, currently depressed, moderate ([facility]            HISTORY OF PRESENT ILLNESS: Waylon Noriega is a 48 y.o. female with a past history remarkable for , referred for evaluation of   Chief Complaint   Patient presents with    Established New Doctor     new patient, here to discuss issues with diarrhea   . Patient seen with a history of diarrhea. Patient states that she has diarrhea the last 2 months. She claims that she had 6-12 bowel movements on daily basis. The bowel movements are liquid consistency without hematochezia, moderate amount. Has a urgency of bowel movements. Had some abdominal bloating and cramps. Has tenesmus. Some of the symptoms are relieved by bowel movements. Also had to 3 nocturnal bowel movements. She lost about 10 pounds in the last 2 months. No symptoms of lactose intolerance. No food allergies. However she stated that eating raisins causes anaphylactic shock. She had significant psychological issues including bipolar disorder, depression, anxiety/stress, panic attacks. Interestingly patient stated that she had once or twice a week mild constipation. Prior to 2 months ago her bowel movements were normal without diarrhea or constipation. She does states persistent chronic GERD. Has occasional swallowing issues and has food sticking sensation in the substernal area. Patient states that she is disabled because of musculoskeletal issues. She is also known to have pulmonary nodule and this is being followed by primary physician. Labs that were done recently and available reviewed and are nonspecific. Has of multiple other medical issues reviewed. Past Medical,Family, and Social History reviewed and does contribute to the patient presentingcondition. Patient's PMH/PSH,SH,PSYCH Hx, MEDs, ALLERGIES, and ROS were all reviewed and updated in the appropriate sections. PAST MEDICAL HISTORY:  Past Medical History:   Diagnosis Date    Acute exacerbation of COPD with asthma ([facility] 6/7/16    Anxiety     Asthma     Benign essential HTN     Bipolar 1 disorder ([facility]     COPD (chronic obstructive pulmonary disease) (HCC)     Depression     Fibromyalgia     Headache(784.0)     Hip arthritis 11/12/2019    Obesity     Pneumonia due to infectious organism 6/7/2016    Post traumatic stress disorder        Past Surgical History:   Procedure Laterality Date    ANESTHESIA NERVE BLOCK N/A 1/9/2020    NERVE BLOCK BILATERAL - KNEE performed by Monique James MD at [address] NERV Bilateral 12/5/2019    INJECTION MEDICATION - HIP performed by Monique James MD at [address] Bilateral 01/09/2020    knees     TONSILLECTOMY         CURRENT MEDICATIONS:    Current Outpatient Medications:     albuterol sulfate  (90 Base) MCG/ACT inhaler, Inhale 1 puff into the lungs every 4 hours as needed for Wheezing, Disp: 18 g, Rfl: 1    gabapentin (NEURONTIN) 600 MG tablet, Take 600 mg by mouth 3 times daily. , Disp: , Rfl:     baclofen (LIORESAL) 10 MG tablet, Take 1 tablet by mouth 3 times daily, Disp: 90 tablet, Rfl: 3    prazosin (MINIPRESS) 2 MG capsule, take 1 capsule by mouth at bedtime, Disp: , Rfl:     VITAMIN D HIGH POTENCY 25 MCG (1000 UT) CAPS, take 2 capsules by mouth once daily, Disp: , Rfl: 0    albuterol (PROVENTIL) (2.5 MG/3ML) 0.083% nebulizer solution, Take 3 mLs by nebulization every 4 hours as needed for Wheezing, Disp: 120 each, Rfl: 3    venlafaxine (EFFEXOR XR) 150 MG extended release capsule, , Disp: , Rfl:     lithium 600 MG capsule, , Disp: , Rfl:    lithium 300 MG tablet, Take 300 mg by mouth 2 times daily 450 mg am 900 mg pm , Disp: , Rfl:     ALPRAZolam (XANAX) 0.5 MG tablet, Take 0.5 mg by mouth nightly as needed for Sleep., Disp: , Rfl:     fluticasone-vilanterol (BREO ELLIPTA) 100-25 MCG/INH AEPB inhaler, Inhale 1 puff into the lungs daily, Disp: 30 each, Rfl: 5    gabapentin (NEURONTIN) 600 MG tablet, Take 1 tablet by mouth 3 times daily for 90 days. , Disp: 90 tablet, Rfl: 2    Incontinence Supplies MISC, 1 Units by Does not apply route 5 times daily, Disp: 200 each, Rfl: 5    ALLERGIES:   Allergies   Allergen Reactions    Food Anaphylaxis     raisins    Penicillins Anaphylaxis    Parafon Forte Dsc [Chlorzoxazone] Hives    Biaxin [Clarithromycin] Rash       FAMILY HISTORY:       Problem Relation Age of Onset    Hypertension Mother     Heart Attack Mother     Heart Disease Mother     Hypertension Father     Heart Disease Father     Diabetes Other         cousin    Heart Disease Brother          SOCIAL HISTORY:   Social History     Socioeconomic History    Marital status:       Spouse name: Not on file    Number of children: Not on file    Years of education: Not on file    Highest education level: Not on file   Occupational History    Not on file   Social Needs    Financial resource strain: Not hard at all    Food insecurity     Worry: Never true     Inability: Never true   [employer] needs     Medical: No     Non-medical: No   Tobacco Use    Smoking status: Former Smoker     Packs/day: 2.00     Years: 20.00     Pack years: 40.00     Types: Cigarettes     Quit date: 1/10/2010     Years since quittin.2    Smokeless tobacco: Never Used   Substance and Sexual Activity    Alcohol use: Yes     Frequency: Monthly or less     Binge frequency: Less than monthly     Comment: rare    Drug use: No    Sexual activity: Not on file   Lifestyle    Physical activity     Days per week: Not on file     Minutes per session: Not on file    Stress: Not on file   Relationships    Social connections     Talks on phone: Not on file     Gets together: Not on file     Attends [employer] service: Not on file     Active member of club or organization: Not on file     Attends meetings of clubs or organizations: Not on file     Relationship status: Not on file    Intimate partner violence     Fear of current or ex partner: Not on file     Emotionally abused: Not on file     Physically abused: Not on file     Forced sexual activity: Not on file   Other Topics Concern    Not on file   Social History Narrative    Not on file       REVIEW OF SYSTEMS:       Review of Systems   Constitutional: Positive for fatigue. Negative for appetite change and fever. HENT: Positive for trouble swallowing. Gastrointestinal: Positive for abdominal pain, constipation, diarrhea, nausea, rectal pain and vomiting. Negative for anal bleeding and blood in stool. Musculoskeletal: Positive for arthralgias, back pain, gait problem, myalgias and neck pain. Neurological: Positive for weakness. Hematological: Bruises/bleeds easily. Psychiatric/Behavioral: Positive for behavioral problems. The patient is nervous/anxious. PHYSICAL EXAMINATION: Vital signs reviewed per the nursing documentation. BP (!) 162/100   Pulse 107   Wt (!) 326 lb (147.9 kg)   LMP 02/20/2019 (Approximate)   SpO2 95%   BMI 49.57 kg/m²   Body mass index is 49.57 kg/m². Physical Exam  Vitals signs and nursing note reviewed. Constitutional:       Appearance: She is well-developed. Comments: Moderately overweight patient. HENT:      Head: Normocephalic and atraumatic. Eyes:      General: No scleral icterus. Conjunctiva/sclera: Conjunctivae normal.      Pupils: Pupils are equal, round, and reactive to light. Neck:      Musculoskeletal: Normal range of motion and neck supple. Thyroid: No thyromegaly. Vascular: No hepatojugular reflux or JVD.       Trachea: No tracheal deviation. Cardiovascular:      Rate and Rhythm: Normal rate and regular rhythm. Heart sounds: Normal heart sounds. Pulmonary:      Effort: Pulmonary effort is normal. No respiratory distress. Breath sounds: Normal breath sounds. No wheezing or rales. Abdominal:      General: Bowel sounds are normal. There is no distension. Palpations: Abdomen is soft. There is no hepatomegaly or mass. Tenderness: There is no abdominal tenderness. There is no rebound. Hernia: No hernia is present. Comments: Obese abdomen. Difficult to examine satisfactorily. Musculoskeletal:         General: No tenderness. Comments: No joint swelling   Lymphadenopathy:      Cervical: No cervical adenopathy. Skin:     General: Skin is warm. Findings: No bruising, ecchymosis, erythema or rash. Neurological:      Mental Status: She is alert and oriented to person, place, and time. Cranial Nerves: No cranial nerve deficit. Psychiatric:         Thought Content: Thought content normal.           LABORATORY DATA: Reviewed  Lab Results   Component Value Date    WBC 8.8 09/28/2020    HGB 14.4 09/28/2020    HCT 44.7 09/28/2020    MCV 98.7 09/28/2020     09/28/2020     04/08/2021    K 4.3 04/08/2021     04/08/2021    CO2 25 04/08/2021    BUN 12 04/08/2021    CREATININE 0.61 04/08/2021    LABALBU 4.2 06/03/2019    BILITOT 0.24 (L) 06/03/2019    ALKPHOS 91 06/03/2019    AST 14 06/03/2019    ALT 11 06/03/2019    INR 1.0 07/08/2016         Lab Results   Component Value Date    RBC 4.53 09/28/2020    HGB 14.4 09/28/2020    MCV 98.7 09/28/2020    MCH 31.8 09/28/2020    MCHC 32.2 09/28/2020    RDW 13.0 09/28/2020    MPV 11.6 09/28/2020    BASOPCT 1 09/28/2020    LYMPHSABS 3.13 09/28/2020    MONOSABS 0.59 09/28/2020    NEUTROABS 3.94 09/28/2020    EOSABS 1.02 (H) 09/28/2020    BASOSABS 0.07 09/28/2020         DIAGNOSTIC TESTING:     No results found.        IMPRESSION: Ms. Christine Perez is a 48 y.o. female with     Assessment:  1. Chronic diarrhea    2. Anxiety    3. Panic disorder    4. Bipolar affective disorder, currently depressed, moderate ([address]        Plan:  Patient had significant diarrhea. Not clear etiology. Need to rule out celiac disease etc.  Also need to check whether she has colitis/microscopic disease. She also has a chronic GERD and dysphagia. The symptoms appears to be nonspecific. She has significant psychological issues, but that this issues contribute to her symptoms need to be evaluated. Discussed with the patient to make food into small pieces, chew well and swallow. Discussed antireflux measures. Discussed regarding overweight and management. Explained regarding long-term issues related to overweight. Advised her to have labs as outlined above. Also may need EGD colonoscopy this are arranged. Discussed with the patient regarding differential diagnosis and management. She understood and agreed. Spent 30 minutes providing patient education and counseling. Thank you for allowing me to participate in the care of Ms. Morgan. For any further questions please do not hesitate to contact me. Note is dictated utilizing voice recognition software. Unfortunately this leads to occasional typographical errors. Please contact our office if you have any questions. I have reviewed and agree with the MA/LPN ROS.      Brenton Roa MD, [facility]  Board Certified in Gastroenterology and [address] [place] [address] Gastroenterology  Office #: [phone]

## 2021-05-05 NOTE — TELEPHONE ENCOUNTER
Dolores Robert called stating she can't keep the PAT nurse call scheduled. 05/12/21 @ 1 pm.  When I offered to reschedule she couldn't understand why the call was needed 2 weeks in advance. Writer tried to explain and was cut off by caller. She states she is getting ready for her father's memorial and doesn't need this and will just reschedule everything. When I tried to confirm she wanted to reschedule the procedure and all testing she asked to speak to someone else. Writer explained everyone is in the process of seeing patients and Dolores Robert demanded to know when she would get a call back. Writer attempted to tell her she would try to have someone call between 3 and 4:30 pm and her response was \"I'll answer if I can\", and then asked if I had cancelled the procedure on 05/24/21, the covid test on 05/20/21 and the PAT call on 05/12/21. She asked for writers name and the call ended. [facility] Scheduling notified of cancellation.

## 2021-05-13 DIAGNOSIS — J44.1 COPD EXACERBATION (HCC): ICD-10-CM

## 2021-05-13 RX ORDER — ALBUTEROL SULFATE 90 UG/1
AEROSOL, METERED RESPIRATORY (INHALATION)
Qty: 18 G | Refills: 1 | Status: SHIPPED | OUTPATIENT
Start: 2021-05-13 | End: 2021-07-13

## 2021-05-13 NOTE — TELEPHONE ENCOUNTER
Health Maintenance   Topic Date Due    Hepatitis C screen  Never done    COVID-19 Vaccine (1) Never done    HIV screen  Never done    Cervical cancer screen  Never done    Breast cancer screen  Never done    Shingles Vaccine (1 of 2) Never done    Colon cancer screen colonoscopy  Never done    Annual Wellness Visit (AWV)  Never done    Diabetes screen  08/06/2021    Flu vaccine (Season Ended) 09/01/2021    Potassium monitoring  04/08/2022    Creatinine monitoring  04/08/2022    Lipid screen  04/11/2024    DTaP/Tdap/Td vaccine (2 - Td) 08/18/2027    Pneumococcal 0-64 years Vaccine  Completed    Hepatitis A vaccine  Aged Out    Hepatitis B vaccine  Aged Out    Hib vaccine  Aged Out    Meningococcal (ACWY) vaccine  Aged Out             (applicable per patient's age: Cancer Screenings, Depression Screening, Fall Risk Screening, Immunizations)    Hemoglobin A1C (%)   Date Value   08/06/2018 5.2   10/24/2014 5.4   05/03/2013 5.7     LDL Cholesterol (mg/dL)   Date Value   05/02/2013 111 (H)     LDL Calculated (mg/dL)   Date Value   04/11/2019 61     AST (U/L)   Date Value   06/03/2019 14     ALT (U/L)   Date Value   06/03/2019 11     BUN (mg/dL)   Date Value   04/08/2021 12      (goal A1C is < 7)   (goal LDL is <100) need 30-50% reduction from baseline     BP Readings from Last 3 Encounters:   04/26/21 (!) 162/100   11/12/20 110/68   06/25/20 119/71    (goal /80)      All Future Testing planned in [facility]:  Lab Frequency Next Occurrence   Baseline Diagnostic Sleep Study Once 11/12/2020   Full PFT Study With Bronchodilator Once 02/16/2021   Clostridium Difficile Toxin/Antigen Once 06/30/2021   Stool For Wbc #1 Once 06/30/2021   Giardia / Cryptosporidum Antigens, DFA Once 06/30/2021   O&P Panel (Travel Associated) #1 Once 06/30/2021   Culture, Aerobic Once 06/30/2021   COLONOSCOPY W/ OR W/O BIOPSY Once 07/27/2021   EGD Once 07/27/2021   Fecal Fat, Qualitative Once 07/27/2021   Giardia antigen Once 07/27/2021   Pancreatic elastase, fecal Once 07/27/2021   Tissue Transglutaminase, IgA Once 07/27/2021   Sedimentation rate, automated Once 07/27/2021   IgA Once 04/26/2021       Next Visit Date:  Future Appointments   Date Time Provider Natalie Goldman   8/16/2021  2:00 PM STA SCR MAMMO RM 2 STAZ MAMMO STA Radiolog            Patient Active Problem List:     COPD (chronic obstructive pulmonary disease) ([facility] [address]     Acute exacerbation of chronic obstructive pulmonary disease (COPD) ([facility] [address]     Multiple pulmonary nodules     Morbid obesity with BMI of 50.0-59.9, adult (HCC)     Leukocytosis     Asthma     Bipolar 1 disorder (HCC)     Anxiety     COPD exacerbation (HCC)     Benign essential HTN     Depression with anxiety     Abnormal TSH     Chronic bilateral low back pain with bilateral sciatica     Fibromyalgia     Hip arthritis     Hip pain, bilateral     Chronic pain of both knees

## 2021-05-14 NOTE — TELEPHONE ENCOUNTER
Pt LVM stating, \"I have been waiting over a week for someone to call me back to r/s my procedure\".

## 2021-05-17 ENCOUNTER — TELEPHONE (OUTPATIENT)
Dept: PRIMARY CARE CLINIC | Age: 51
End: 2021-05-17

## 2021-05-17 NOTE — TELEPHONE ENCOUNTER
Patient called upset stating that she has been waiting for over 10 days to rs her procedure. Pt was extremely rude to staff. Blair Dexterini spoke with pt and tried to explain that when she called the first time to cancel, Jameel Rose tried to rs at that time but pt refused. Pt stated that she will never speak to Jameel Rose again. Writer tried to explain to pt that we are extremely short staffed and that her call can be transferred and if the schedulers do not answer to please leave a message. call was transferred, pt did not leave a message, she called back and spoke with Margaret CESAR who then transferred call back to [facility]. [facility] spoke to  Pt again, pt was rude and stated that \"someone better call me back today\" [facility] tried to explain to pt once again that we are short staffed and that her call will be returned. Pt stated that if she does not get a call back that she will be calling [facility] to find out who she can call to report a complaint. Please call pt back to rs the procedure.

## 2021-05-17 NOTE — TELEPHONE ENCOUNTER
Patient is requesting new referral due to being unsatisfied with current specialist referred to for GI.   Health Maintenance   Topic Date Due    Hepatitis C screen  Never done    COVID-19 Vaccine (1) Never done    HIV screen  Never done    Cervical cancer screen  Never done    Breast cancer screen  Never done    Shingles Vaccine (1 of 2) Never done    Colon cancer screen colonoscopy  Never done    Annual Wellness Visit (AWV)  Never done    Diabetes screen  08/06/2021    Flu vaccine (Season Ended) 09/01/2021    Potassium monitoring  04/08/2022    Creatinine monitoring  04/08/2022    Lipid screen  04/11/2024    DTaP/Tdap/Td vaccine (2 - Td) 08/18/2027    Pneumococcal 0-64 years Vaccine (2 of 2) 09/25/2035    Hepatitis A vaccine  Aged Out    Hepatitis B vaccine  Aged Out    Hib vaccine  Aged Out    Meningococcal (ACWY) vaccine  Aged Out             (applicable per patient's age: Cancer Screenings, Depression Screening, Fall Risk Screening, Immunizations)    Hemoglobin A1C (%)   Date Value   08/06/2018 5.2   10/24/2014 5.4   05/03/2013 5.7     LDL Cholesterol (mg/dL)   Date Value   05/02/2013 111 (H)     LDL Calculated (mg/dL)   Date Value   04/11/2019 61     AST (U/L)   Date Value   06/03/2019 14     ALT (U/L)   Date Value   06/03/2019 11     BUN (mg/dL)   Date Value   04/08/2021 12      (goal A1C is < 7)   (goal LDL is <100) need 30-50% reduction from baseline     BP Readings from Last 3 Encounters:   04/26/21 (!) 162/100   11/12/20 110/68   06/25/20 119/71    (goal /80)      All Future Testing planned in [facility]:  Lab Frequency Next Occurrence   Baseline Diagnostic Sleep Study Once 11/12/2020   Full PFT Study With Bronchodilator Once 02/16/2021   Clostridium Difficile Toxin/Antigen Once 06/30/2021   Stool For Wbc #1 Once 06/30/2021   Giardia / Cryptosporidum Antigens, DFA Once 06/30/2021   O&P Panel (Travel Associated) #1 Once 06/30/2021   Culture, Aerobic Once 06/30/2021   COLONOSCOPY W/ OR W/O BIOPSY Once 07/27/2021   EGD Once 07/27/2021   Fecal Fat, Qualitative Once 07/27/2021   Giardia antigen Once 07/27/2021   Pancreatic elastase, fecal Once 07/27/2021   Tissue Transglutaminase, IgA Once 07/27/2021   Sedimentation rate, automated Once 07/27/2021   IgA Once 04/26/2021       Next Visit Date:  Future Appointments   Date Time Provider Natalie Goldman   7/19/2021  3:30 PM Marcus Carroll MD [facility]   8/16/2021  2:00 PM STA SCR MAMMO RM 2 STAZ MAMMO STA Radiolog            Patient Active Problem List:     COPD (chronic obstructive pulmonary disease) ([facility] [address]     Acute exacerbation of chronic obstructive pulmonary disease (COPD) ([facility] [address]     Multiple pulmonary nodules     Morbid obesity with BMI of 50.0-59.9, adult (HCC)     Leukocytosis     Asthma     Bipolar 1 disorder (HCC)     Anxiety     COPD exacerbation (HCC)     Benign essential HTN     Depression with anxiety     Abnormal TSH     Chronic bilateral low back pain with bilateral sciatica     Fibromyalgia     Hip arthritis     Hip pain, bilateral     Chronic pain of both knees

## 2021-05-18 ENCOUNTER — TELEPHONE (OUTPATIENT)
Dept: GASTROENTEROLOGY | Age: 51
End: 2021-05-18

## 2021-05-18 NOTE — TELEPHONE ENCOUNTER
Writer called and left message on 5/18/21 for the patient to call the office back. Writer let the patient know that the doctor is out of the office all week , but the writer will be in contact with him in the next day or 2 and she will consult him of her issues and get his recommendations and call her back.

## 2021-05-18 NOTE — TELEPHONE ENCOUNTER
Received voicemail from pt stating she received a call back from Daniela Silva and she wants to know who she is. She stated she would appreciate it if someone would call her back and actually speak to her on the phone and not leave details in a message. Reviewed chart notes and communication was made through My Chart not via voicemail regarding a letter the patient received and she was advised to disregard the letter.

## 2021-06-04 NOTE — TELEPHONE ENCOUNTER
Writer consulted with doctor about patient's concerns. Writer let provider know that patient refused to perform the stool test that was ordered for her. Dr. Mary Anne Alexander stated that if the patient wants our office to follow her medical needs she will have to comply with his recommendations. Writer called the patient and apologized for not getting back to her sooner. Writer told the patient Dr. Mary Anne Alexander recommendations and again patient refused saying she was not going to do them. Writer told her that the test would need to be done. Patient became upset. Patient started to say that she was not going to follow the recommendations, writer spoke up and stated that she will have to find another provider. At this point patient became very upset and started to call the writer very inappropriate name and started using  Profanity. Writer was like wow and patient said \"it took you a month to call me back and you wow me, you know what Fanta. Joseph Tabares \" and began the profanity and the inappropriate name calling and after about 20 seconds she hung up on the writer.

## 2021-06-04 NOTE — TELEPHONE ENCOUNTER
Pt called in today wanting to cancel her procedure date, any test date to do along w/ proc date and her f/u OV date due to she states, if the doctor is unable to call her back in regards to her diarrhea issues she is having, then she doesn't want to proceed w/ procedures and follow up appt. She states she has called in regarding her diarrhea issues and she has not received a call back from the doctor to address her issues. She states until she gets a call back from Dr Ana Martinez about her diarrhea issues, she will not reschedule. Pt is upset that she has not received a call back from him and it has been a long time since she left a message. Writer informed pt we will cancel appts and forward msg to the clinical staff to make Dr Ana Martinez aware.

## 2021-06-07 NOTE — TELEPHONE ENCOUNTER
Pt has been dismissed from practice. Chart has been blocked from scheduling and letter has been written, signed and sent.

## 2021-06-25 ENCOUNTER — TELEPHONE (OUTPATIENT)
Dept: PRIMARY CARE CLINIC | Age: 51
End: 2021-06-25

## 2021-06-25 NOTE — TELEPHONE ENCOUNTER
[address] [phone], spoke to Whick Kossuth and let her know patient was a no show for her surgery clearance appointment.

## 2021-06-29 ENCOUNTER — HOSPITAL ENCOUNTER (OUTPATIENT)
Age: 51
Discharge: HOME | End: 2021-06-29
Payer: MEDICARE

## 2021-06-29 ENCOUNTER — OFFICE VISIT (OUTPATIENT)
Dept: PRIMARY CARE CLINIC | Age: 51
End: 2021-06-29
Payer: MEDICARE

## 2021-06-29 VITALS
HEART RATE: 91 BPM | DIASTOLIC BLOOD PRESSURE: 88 MMHG | OXYGEN SATURATION: 95 % | SYSTOLIC BLOOD PRESSURE: 150 MMHG | TEMPERATURE: 97.9 F

## 2021-06-29 DIAGNOSIS — Z01.818 PREOPERATIVE EXAMINATION: Primary | ICD-10-CM

## 2021-06-29 PROBLEM — G89.29 CHRONIC LOW BACK PAIN: Status: ACTIVE | Noted: 2019-01-25

## 2021-06-29 PROBLEM — M16.0 PRIMARY OSTEOARTHRITIS OF BOTH HIPS: Status: ACTIVE | Noted: 2019-05-10

## 2021-06-29 PROBLEM — I10 BENIGN ESSENTIAL HTN: Status: ACTIVE | Noted: 2019-10-18

## 2021-06-29 PROBLEM — Z87.891 PERSONAL HISTORY OF NICOTINE DEPENDENCE: Status: ACTIVE | Noted: 2019-05-30

## 2021-06-29 PROBLEM — M54.50 CHRONIC LOW BACK PAIN: Status: ACTIVE | Noted: 2019-01-25

## 2021-06-29 PROBLEM — J30.2 SEASONAL ALLERGIES: Status: ACTIVE | Noted: 2019-10-18

## 2021-06-29 PROBLEM — E78.5 HYPERLIPIDEMIA: Status: ACTIVE | Noted: 2019-10-18

## 2021-06-29 PROBLEM — G43.909 MIGRAINES: Status: ACTIVE | Noted: 2019-10-18

## 2021-06-29 PROBLEM — M48.062 LUMBAR STENOSIS WITH NEUROGENIC CLAUDICATION: Status: ACTIVE | Noted: 2019-04-12

## 2021-06-29 PROBLEM — Z82.69: Status: ACTIVE | Noted: 2019-03-13

## 2021-06-29 PROBLEM — F31.9 BIPOLAR 1 DISORDER (HCC): Status: ACTIVE | Noted: 2019-10-18

## 2021-06-29 PROBLEM — M25.559 HIP PAIN: Status: ACTIVE | Noted: 2019-05-10

## 2021-06-29 PROBLEM — F41.9 ANXIETY: Status: ACTIVE | Noted: 2019-10-18

## 2021-06-29 PROBLEM — M79.7 FIBROMYALGIA: Chronic | Status: ACTIVE | Noted: 2018-04-16

## 2021-06-29 PROBLEM — M17.0 PRIMARY OSTEOARTHRITIS OF BOTH KNEES: Status: ACTIVE | Noted: 2021-01-19

## 2021-06-29 PROBLEM — G47.30 SLEEP APNEA: Status: ACTIVE | Noted: 2019-05-30

## 2021-06-29 LAB
ALBUMIN SERPL-MCNC: 4 G/DL (ref 3.5–5.2)
ALBUMIN/GLOBULIN RATIO: 1.4 (ref 1–2.5)
ALP BLD-CCNC: 98 U/L (ref 35–104)
ALT SERPL-CCNC: 30 U/L (ref 5–33)
ANION GAP SERPL CALCULATED.3IONS-SCNC: 10 MMOL/L (ref 9–17)
AST SERPL-CCNC: 44 U/L
BILIRUB SERPL-MCNC: 0.27 MG/DL (ref 0.3–1.2)
BUN BLDV-MCNC: 20 MG/DL (ref 6–20)
BUN/CREAT BLD: ABNORMAL (ref 9–20)
C-REACTIVE PROTEIN: 6.4 MG/L (ref 0–5)
CALCIUM SERPL-MCNC: 8.9 MG/DL (ref 8.6–10.4)
CHLORIDE BLD-SCNC: 105 MMOL/L (ref 98–107)
CO2: 23 MMOL/L (ref 20–31)
CREAT SERPL-MCNC: 0.54 MG/DL (ref 0.5–0.9)
GFR AFRICAN AMERICAN: >60 ML/MIN
GFR NON-AFRICAN AMERICAN: >60 ML/MIN
GFR SERPL CREATININE-BSD FRML MDRD: ABNORMAL ML/MIN/{1.73_M2}
GFR SERPL CREATININE-BSD FRML MDRD: ABNORMAL ML/MIN/{1.73_M2}
GLUCOSE BLD-MCNC: 108 MG/DL (ref 70–99)
HCT VFR BLD CALC: 40.6 % (ref 36.3–47.1)
HEMOGLOBIN: 13.6 G/DL (ref 11.9–15.1)
INR BLD: 1
MCH RBC QN AUTO: 32.3 PG (ref 25.2–33.5)
MCHC RBC AUTO-ENTMCNC: 33.5 G/DL (ref 28.4–34.8)
MCV RBC AUTO: 96.4 FL (ref 82.6–102.9)
NRBC AUTOMATED: 0 PER 100 WBC
PDW BLD-RTO: 13.1 % (ref 11.8–14.4)
PLATELET # BLD: 197 K/UL (ref 138–453)
PMV BLD AUTO: 10.8 FL (ref 8.1–13.5)
POTASSIUM SERPL-SCNC: 4.2 MMOL/L (ref 3.7–5.3)
PROTHROMBIN TIME: 11.1 SEC (ref 9.1–12.3)
RBC # BLD: 4.21 M/UL (ref 3.95–5.11)
SEDIMENTATION RATE, ERYTHROCYTE: 5 MM (ref 0–30)
SODIUM BLD-SCNC: 138 MMOL/L (ref 135–144)
TOTAL PROTEIN: 6.8 G/DL (ref 6.4–8.3)
VITAMIN D 25-HYDROXY: 17.7 NG/ML (ref 30–100)
WBC # BLD: 7.5 K/UL (ref 3.5–11.3)

## 2021-06-29 PROCEDURE — 80053 COMPREHEN METABOLIC PANEL: CPT

## 2021-06-29 PROCEDURE — 85610 PROTHROMBIN TIME: CPT

## 2021-06-29 PROCEDURE — 36415 COLL VENOUS BLD VENIPUNCTURE: CPT

## 2021-06-29 PROCEDURE — 85652 RBC SED RATE AUTOMATED: CPT

## 2021-06-29 PROCEDURE — G8427 DOCREV CUR MEDS BY ELIG CLIN: HCPCS | Performed by: NURSE PRACTITIONER

## 2021-06-29 PROCEDURE — 1036F TOBACCO NON-USER: CPT | Performed by: NURSE PRACTITIONER

## 2021-06-29 PROCEDURE — 82306 VITAMIN D 25 HYDROXY: CPT

## 2021-06-29 PROCEDURE — G8417 CALC BMI ABV UP PARAM F/U: HCPCS | Performed by: NURSE PRACTITIONER

## 2021-06-29 PROCEDURE — 85027 COMPLETE CBC AUTOMATED: CPT

## 2021-06-29 PROCEDURE — 86140 C-REACTIVE PROTEIN: CPT

## 2021-06-29 PROCEDURE — 99214 OFFICE O/P EST MOD 30 MIN: CPT | Performed by: NURSE PRACTITIONER

## 2021-06-29 PROCEDURE — 3017F COLORECTAL CA SCREEN DOC REV: CPT | Performed by: NURSE PRACTITIONER

## 2021-06-29 RX ORDER — CARBAMAZEPINE 200 MG/1
TABLET ORAL
COMMUNITY
Start: 2021-05-25 | End: 2022-04-22

## 2021-06-29 ASSESSMENT — ENCOUNTER SYMPTOMS
BLOOD IN STOOL: 0
COUGH: 1
VOMITING: 0
SINUS PAIN: 0
WHEEZING: 1
TROUBLE SWALLOWING: 0
NAUSEA: 0
SHORTNESS OF BREATH: 1
DIARRHEA: 1
CHEST TIGHTNESS: 0
BACK PAIN: 1
ABDOMINAL PAIN: 1

## 2021-06-29 NOTE — PROGRESS NOTES
--Kristin Leal MA on 6/29/2021 at 12:49 PM  Visit Information    Have you changed or started any medications since your last visit including any over-the-counter medicines, vitamins, or herbal medicines? no   Are you having any side effects from any of your medications? -  no  Have you stopped taking any of your medications? Is so, why? -  no    Have you seen any other physician or provider since your last visit? Yes - Records Obtained  Have you had any other diagnostic tests since your last visit? No  Have you been seen in the emergency room and/or had an admission to a hospital since we last saw you? No  Have you had your routine dental cleaning in the past 6 months? no    Have you activated your [employer] account? If not, what are your barriers?  Yes     Patient Care Team:  LAWRENCE Chaidez CNP as PCP - General (Family Medicine)  LAWRENCE Chaidez CNP as PCP - [facility] Provider  Tera French MD as Consulting Physician (Gastroenterology)    Medical History Review  Past Medical, Family, and Social History reviewed and does not contribute to the patient presenting condition    Health Maintenance   Topic Date Due    Hepatitis C screen  Never done    HIV screen  Never done    Cervical cancer screen  Never done    Breast cancer screen  Never done    Shingles Vaccine (1 of 2) Never done    Colon cancer screen colonoscopy  Never done    Annual Wellness Visit (AWV)  Never done    Diabetes screen  08/06/2021    Flu vaccine (Season Ended) 09/01/2021    Potassium monitoring  04/08/2022    Creatinine monitoring  04/08/2022    Lipid screen  04/11/2024    DTaP/Tdap/Td vaccine (2 - Td or Tdap) 08/18/2027    Pneumococcal 0-64 years Vaccine (2 of 2 - PPSV23) 09/25/2035    COVID-19 Vaccine  Completed    Hepatitis A vaccine  Aged Out    Hepatitis B vaccine  Aged Out    Hib vaccine  Aged Out    Meningococcal (ACWY) vaccine  Aged Out      Subjective:      Beba Agustin is a 48 [address] [place]    CHOLECYSTECTOMY      HC INJECT OTHER PERPHRL NERV Bilateral 2019    INJECTION MEDICATION - HIP performed by Giuliano Mayfield MD at [address] Bilateral 2020    knees     TONSILLECTOMY       Family History   Problem Relation Age of Onset    Hypertension Mother     Heart Attack Mother     Heart Disease Mother     Hypertension Father     Heart Disease Father     Diabetes Other         cousin    Heart Disease Brother      Social History     Socioeconomic History    Marital status:      Spouse name: None    Number of children: None    Years of education: None    Highest education level: None   Occupational History    None   Tobacco Use    Smoking status: Former Smoker     Packs/day: 2.00     Years: 20.00     Pack years: 40.00     Types: Cigarettes     Quit date: 1/10/2010     Years since quittin.4    Smokeless tobacco: Never Used   Vaping Use    Vaping Use: Never used   Substance and Sexual Activity    Alcohol use: Yes     Comment: rare    Drug use: No    Sexual activity: None   Other Topics Concern    None   Social History Narrative    None     Social Determinants of Health     Financial Resource Strain: Low Risk     Difficulty of Paying Living Expenses: Not hard at all   Food Insecurity: No Food Insecurity    Worried About Running Out of Food in the Last Year: Never true    Osmin of Food in the Last Year: Never true   Transportation Needs: No Transportation Needs    Lack of Transportation (Medical): No    Lack of Transportation (Non-Medical):  No   Physical Activity:     Days of Exercise per Week:     Minutes of Exercise per Session:    Stress:     Feeling of Stress :    Social Connections:     Frequency of Communication with Friends and Family:     Frequency of Social Gatherings with Friends and Family:     Attends [employer] Services:     Active Member of Clubs or Organizations:     Attends Club or Organization Meetings:     Marital Status:    Intimate Partner Violence:     Fear of Current or Ex-Partner:     Emotionally Abused:     Physically Abused:     Sexually Abused:      Current Outpatient Medications   Medication Sig Dispense Refill    carBAMazepine (TEGRETOL) 200 MG tablet take 1 tablet by mouth at bedtime for 4 days then take 2 tablets . ..  (REFER TO PRESCRIPTION NOTES).  albuterol sulfate  (90 Base) MCG/ACT inhaler inhale 1 puff by mouth and INTO THE LUNGS every 4 hours if needed for wheezing 18 g 1    fluticasone-vilanterol (BREO ELLIPTA) 100-25 MCG/INH AEPB inhaler Inhale 1 puff into the lungs daily 30 each 5    gabapentin (NEURONTIN) 600 MG tablet Take 600 mg by mouth 3 times daily.  baclofen (LIORESAL) 10 MG tablet Take 1 tablet by mouth 3 times daily 90 tablet 3    VITAMIN D HIGH POTENCY 25 MCG (1000 UT) CAPS take 2 capsules by mouth once daily  0    albuterol (PROVENTIL) (2.5 MG/3ML) 0.083% nebulizer solution Take 3 mLs by nebulization every 4 hours as needed for Wheezing 120 each 3    venlafaxine (EFFEXOR XR) 150 MG extended release capsule       ALPRAZolam (XANAX) 0.5 MG tablet Take 0.5 mg by mouth nightly as needed for Sleep.  Incontinence Supplies MISC 1 Units by Does not apply route 5 times daily 200 each 5     No current facility-administered medications for this visit. Allergies   Allergen Reactions    Food Anaphylaxis     raisins    Penicillins Anaphylaxis    Parafon Forte Dsc [Chlorzoxazone] Hives    Biaxin [Clarithromycin] Rash       Review of Systems  Review of Systems   Constitutional: Negative for chills, diaphoresis and fever. HENT: Negative for congestion, ear pain, sinus pain, sneezing and trouble swallowing. Respiratory: Positive for cough, shortness of breath and wheezing. Negative for chest tightness. Cardiovascular: Negative for chest pain and palpitations. Gastrointestinal: Positive for abdominal pain and diarrhea.  Negative for blood in cervical adenopathy. Skin:     General: Skin is warm. Capillary Refill: Capillary refill takes less than 2 seconds. Neurological:      Mental Status: She is alert and oriented to person, place, and time. Cranial Nerves: No cranial nerve deficit. Psychiatric:         Behavior: Behavior normal.           Predictors of intubation difficulty:   Morbid obesity? yes - BMI 49.7   Anatomically abnormal facies? no   Short, thick neck? no   Neck range of motion: normal   Dentition: No chipped, loose, or missing teeth. Cardiographics  ECG: To be completed through PAT  Echocardiogram: not done    Imaging  Chest X-Ray: air trapping/emphysema     Lab Review   not applicable, to be completed today through PAT        Assessment:        48 y.o. female with planned surgery as above. Known risk factors for perioperative complications: Chronic pulmonary disease  ALLYSON    Difficulty with intubation is not anticipated. Cardiac Risk Estimation: per the Revised Cardiac Risk Index (Circ. 100:1043, 1999), the patient's risk factors for cardiac complications include n/aputting her in: RCI RISK CLASS I (0 risk factors, risk of major cardiac compl. appr. 0.5%)    Current medications which may produce withdrawal symptoms if withheld perioperatively: n/a       Plan:      1. Preoperative workup as follows chest x-ray, ECG, hemoglobin, hematocrit, electrolytes, creatinine, glucose  2. Change in medication regimen before surgery: none, continue med regimen including morning of surgery, w/sip of water  3. Given comorbidities of obesity, COPD, and ALLYSON Joanie Corado is considered at above average risk for surgical complication. She follows closely with pulmonology and is medically optimized for surgery at this time. Awaiting final EKG and lab results.

## 2021-07-08 ENCOUNTER — TELEPHONE (OUTPATIENT)
Dept: PRIMARY CARE CLINIC | Age: 51
End: 2021-07-08

## 2021-07-08 NOTE — TELEPHONE ENCOUNTER
I had checked a few days ago and the EKG was not finalized, I am planning on rechecking today so I can send it

## 2021-07-08 NOTE — TELEPHONE ENCOUNTER
06/29/2021 blood work  completed waiting on clearance letter to get surgery. Wants to know when paperwork will be sent?

## 2021-07-08 NOTE — TELEPHONE ENCOUNTER
[address] [facility] was closed when called was placed. Their office hrs were 7-4. Faxed over a request for a copy of pt's current EKG report.

## 2021-07-13 DIAGNOSIS — J44.1 COPD EXACERBATION (HCC): ICD-10-CM

## 2021-07-13 RX ORDER — ALBUTEROL SULFATE 90 UG/1
AEROSOL, METERED RESPIRATORY (INHALATION)
Qty: 18 G | Refills: 1 | Status: SHIPPED | OUTPATIENT
Start: 2021-07-13 | End: 2021-08-17

## 2021-07-13 NOTE — TELEPHONE ENCOUNTER
Next Visit Date:  11/1/2021    Hemoglobin A1C (%)   Date Value   08/06/2018 5.2   10/24/2014 5.4   05/03/2013 5.7             ( goal A1C is < 7)   No results found for: LABMICR  LDL Cholesterol (mg/dL)   Date Value   05/02/2013 111 (H)     LDL Calculated (mg/dL)   Date Value   04/11/2019 61       (goal LDL is <100)   AST (U/L)   Date Value   06/29/2021 44 (H)     ALT (U/L)   Date Value   06/29/2021 30     BUN (mg/dL)   Date Value   06/29/2021 20     BP Readings from Last 3 Encounters:   06/29/21 (!) 150/88   04/26/21 (!) 162/100   11/12/20 110/68          (goal 120/80)        Patient Active Problem List:     Chronic obstructive pulmonary disease, unspecified (HCC)     Acute exacerbation of chronic obstructive pulmonary disease (COPD) ([facility] [address]     Multiple nodules of lung     Morbid obesity with body mass index (BMI) of 50.0 to 59.9 in adult (HCC)     Leukocytosis     Sleep apnea     Bipolar 1 disorder (HCC)     Anxiety     COPD exacerbation (HCC)     Benign essential HTN     Major depression     Abnormal TSH     Chronic low back pain     Fibromyalgia     Hip pain     Hip pain, bilateral     Chronic pain of both knees     Hyperlipidemia     Family history of diseases of the ms sys and connective tiss     Lumbar stenosis with neurogenic claudication     Migraines     Personal history of nicotine dependence     Primary osteoarthritis of both hips     Primary osteoarthritis of both knees     Seasonal allergies      ----Ortega Mcgraw

## 2021-08-17 DIAGNOSIS — J44.1 COPD EXACERBATION (HCC): ICD-10-CM

## 2021-08-17 RX ORDER — ALBUTEROL SULFATE 90 UG/1
AEROSOL, METERED RESPIRATORY (INHALATION)
Qty: 18 G | Refills: 1 | Status: SHIPPED | OUTPATIENT
Start: 2021-08-17 | End: 2021-11-04 | Stop reason: SDUPTHER

## 2021-08-17 NOTE — TELEPHONE ENCOUNTER
Health Maintenance   Topic Date Due    Hepatitis C screen  Never done    HIV screen  Never done    Cervical cancer screen  Never done    Colon cancer screen colonoscopy  Never done    Breast cancer screen  Never done    Shingles Vaccine (1 of 2) Never done    Low dose CT lung screening  09/25/2020    Annual Wellness Visit (AWV)  Never done    Diabetes screen  08/06/2021    Flu vaccine (1) 09/01/2021    Potassium monitoring  06/29/2022    Creatinine monitoring  06/29/2022    Lipid screen  04/11/2024    DTaP/Tdap/Td vaccine (2 - Td or Tdap) 08/18/2027    Pneumococcal 0-64 years Vaccine (2 of 2 - PPSV23) 09/25/2035    COVID-19 Vaccine  Completed    Hepatitis A vaccine  Aged Out    Hepatitis B vaccine  Aged Out    Hib vaccine  Aged Out    Meningococcal (ACWY) vaccine  Aged Out             (applicable per patient's age: Cancer Screenings, Depression Screening, Fall Risk Screening, Immunizations)    Hemoglobin A1C (%)   Date Value   08/06/2018 5.2   10/24/2014 5.4   05/03/2013 5.7     LDL Cholesterol (mg/dL)   Date Value   05/02/2013 111 (H)     LDL Calculated (mg/dL)   Date Value   04/11/2019 61     AST (U/L)   Date Value   06/29/2021 44 (H)     ALT (U/L)   Date Value   06/29/2021 30     BUN (mg/dL)   Date Value   06/29/2021 20      (goal A1C is < 7)   (goal LDL is <100) need 30-50% reduction from baseline     BP Readings from Last 3 Encounters:   06/29/21 (!) 150/88   04/26/21 (!) 162/100   11/12/20 110/68    (goal /80)      All Future Testing planned in [facility]:  Lab Frequency Next Occurrence   Baseline Diagnostic Sleep Study Once 11/12/2020   Full PFT Study With Bronchodilator Once 02/16/2021   Clostridium Difficile Toxin/Antigen Once 06/30/2021   Stool For Wbc #1 Once 06/30/2021   Giardia / Cryptosporidum Antigens, DFA Once 06/30/2021   O&P Panel (Travel Associated) #1 Once 06/30/2021   Culture, Aerobic Once 06/30/2021   COLONOSCOPY W/ OR W/O BIOPSY Once 07/27/2021   EGD Once 07/27/2021

## 2021-09-09 ENCOUNTER — E-VISIT (OUTPATIENT)
Dept: PRIMARY CARE CLINIC | Age: 51
End: 2021-09-09
Payer: MEDICARE

## 2021-09-09 DIAGNOSIS — J06.9 VIRAL URI WITH COUGH: Primary | ICD-10-CM

## 2021-09-09 PROCEDURE — 98970 NQHP OL DIG ASSMT&MGMT 5-10: CPT | Performed by: NURSE PRACTITIONER

## 2021-09-09 RX ORDER — OXYMETAZOLINE HYDROCHLORIDE 0.05 G/100ML
2 SPRAY NASAL 2 TIMES DAILY
Qty: 2 ML | Refills: 0 | Status: SHIPPED | OUTPATIENT
Start: 2021-09-09 | End: 2021-09-12

## 2021-09-09 ASSESSMENT — LIFESTYLE VARIABLES
PACKS_PER_DAY: 0
SMOKING_STATUS: NO, I'M A FORMER SMOKER
SMOKING_YEARS: 0

## 2021-09-09 NOTE — PROGRESS NOTES
Patient with reported 5-day symptoms of cough and congestion along with sinus pain and pressure. No fevers, no chest pains, no shortness of breath. Suspect viral URI, suggest symptomatic care at home.

## 2021-11-04 ENCOUNTER — TELEMEDICINE (OUTPATIENT)
Dept: PRIMARY CARE CLINIC | Age: 51
End: 2021-11-04
Payer: COMMERCIAL

## 2021-11-04 DIAGNOSIS — J44.9 CHRONIC OBSTRUCTIVE PULMONARY DISEASE, UNSPECIFIED COPD TYPE (HCC): ICD-10-CM

## 2021-11-04 DIAGNOSIS — M25.551 HIP PAIN, BILATERAL: Primary | Chronic | ICD-10-CM

## 2021-11-04 DIAGNOSIS — M25.552 HIP PAIN, BILATERAL: Primary | Chronic | ICD-10-CM

## 2021-11-04 PROCEDURE — G8427 DOCREV CUR MEDS BY ELIG CLIN: HCPCS | Performed by: NURSE PRACTITIONER

## 2021-11-04 PROCEDURE — 3017F COLORECTAL CA SCREEN DOC REV: CPT | Performed by: NURSE PRACTITIONER

## 2021-11-04 PROCEDURE — 99213 OFFICE O/P EST LOW 20 MIN: CPT | Performed by: NURSE PRACTITIONER

## 2021-11-04 RX ORDER — ALBUTEROL SULFATE 90 UG/1
AEROSOL, METERED RESPIRATORY (INHALATION)
Qty: 18 G | Refills: 2 | Status: SHIPPED | OUTPATIENT
Start: 2021-11-04 | End: 2022-02-01 | Stop reason: SDUPTHER

## 2021-11-04 RX ORDER — FLUTICASONE FUROATE AND VILANTEROL TRIFENATATE 100; 25 UG/1; UG/1
1 POWDER RESPIRATORY (INHALATION) DAILY
Qty: 90 EACH | Refills: 2 | Status: SHIPPED | OUTPATIENT
Start: 2021-11-04 | End: 2022-09-29

## 2021-11-04 RX ORDER — ALBUTEROL SULFATE 2.5 MG/3ML
2.5 SOLUTION RESPIRATORY (INHALATION) EVERY 4 HOURS PRN
Qty: 120 EACH | Refills: 3 | Status: CANCELLED | OUTPATIENT
Start: 2021-11-04

## 2021-11-04 ASSESSMENT — ENCOUNTER SYMPTOMS
RHINORRHEA: 0
SHORTNESS OF BREATH: 0
SPUTUM PRODUCTION: 1
NAUSEA: 0
WHEEZING: 0
DIARRHEA: 0
COUGH: 0
HEMOPTYSIS: 0
SINUS PRESSURE: 1
ABDOMINAL PAIN: 0

## 2021-11-04 NOTE — PROGRESS NOTES
by beta-agonist. Her past medical history is significant for asthma. Review of Systems   Constitutional: Negative for chills, fever and unexpected weight change. HENT: Positive for sinus pressure. Negative for postnasal drip, rhinorrhea and sneezing. Respiratory: Positive for sputum production. Negative for cough, hemoptysis, shortness of breath and wheezing. Cardiovascular: Positive for leg swelling. Negative for chest pain. Gastrointestinal: Negative for abdominal pain, diarrhea and nausea. Genitourinary: Negative for decreased urine volume, difficulty urinating, dysuria and hematuria. Musculoskeletal: Positive for arthralgias. Skin: Negative for rash. Neurological: Negative for dizziness, syncope and weakness. Prior to Visit Medications    Medication Sig Taking? Authorizing Provider   fluticasone-vilanterol (BREO ELLIPTA) 100-25 MCG/INH AEPB inhaler Inhale 1 puff into the lungs daily Yes LAWRENCE Goldberg CNP   albuterol sulfate  (90 Base) MCG/ACT inhaler inhale 1 puff by mouth and INTO THE LUNGS every 4 hours if needed for wheezing Yes LAWRENCE Goldberg CNP   carBAMazepine (TEGRETOL) 200 MG tablet take 1 tablet by mouth at bedtime for 4 days then take 2 tablets . ..  (REFER TO PRESCRIPTION NOTES). Yes Historical Provider, MD   gabapentin (NEURONTIN) 600 MG tablet Take 600 mg by mouth 3 times daily. Yes Historical Provider, MD   baclofen (LIORESAL) 10 MG tablet Take 1 tablet by mouth 3 times daily Yes Johan Blanton MD   VITAMIN D HIGH POTENCY 25 MCG (1000 UT) CAPS take 2 capsules by mouth once daily Yes Historical Provider, MD   albuterol (PROVENTIL) (2.5 MG/3ML) 0.083% nebulizer solution Take 3 mLs by nebulization every 4 hours as needed for Wheezing Yes LAWRENCE Goldberg CNP   venlafaxine (EFFEXOR XR) 150 MG extended release capsule  Yes Historical Provider, MD   ALPRAZolam Mcfadden Latus) 0.5 MG tablet Take 0.5 mg by mouth nightly as needed for Sleep.  Yes Historical Provider, MD       Social History     Tobacco Use    Smoking status: Former Smoker     Packs/day: 2.00     Years: 20.00     Pack years: 40.00     Types: Cigarettes     Quit date: 1/10/2010     Years since quittin.8    Smokeless tobacco: Never Used   Vaping Use    Vaping Use: Never used   Substance Use Topics    Alcohol use: Yes     Comment: rare    Drug use: No        Past Medical History:   Diagnosis Date    Acute exacerbation of COPD with asthma ([facility] 16    Anxiety     Asthma     Benign essential HTN     Bipolar 1 disorder ([facility]     COPD (chronic obstructive pulmonary disease) ([facility]     Depression     Fibromyalgia     Headache(784.0)     Hip arthritis 2019    Hyperlipidemia 10/18/2019    Obesity     Pneumonia due to infectious organism 2016    Post traumatic stress disorder         Allergies   Allergen Reactions    Food Anaphylaxis     raisins    Penicillins Anaphylaxis    Parafon Forte Dsc [Chlorzoxazone] Hives    Biaxin [Clarithromycin] Rash   ,   Past Medical History:   Diagnosis Date    Acute exacerbation of COPD with asthma ([facility] 16    Anxiety     Asthma     Benign essential HTN     Bipolar 1 disorder ([facility]     COPD [facility] obstructive pulmonary disease) [facility]     Depression     Fibromyalgia     Headache(784.0)     Hip arthritis 2019    Hyperlipidemia 10/18/2019    Obesity     Pneumonia due to infectious organism 2016    Post traumatic stress disorder    ,   Past Surgical History:   Procedure Laterality Date    ANESTHESIA NERVE BLOCK N/A 2020    NERVE BLOCK BILATERAL - KNEE performed by Harman Dejesus MD at [address] NERV Bilateral 2019    INJECTION MEDICATION - HIP performed by Harman Dejesus MD at [address] Bilateral 2020    knees     TONSILLECTOMY     ,   Social History     Tobacco Use    Smoking status: Former Smoker Packs/day: 2.00     Years: 20.00     Pack years: 40.00     Types: Cigarettes     Quit date: 1/10/2010     Years since quittin.8    Smokeless tobacco: Never Used   Vaping Use    Vaping Use: Never used   Substance Use Topics    Alcohol use: Yes     Comment: rare    Drug use: No       CBC:  Lab Results   Component Value Date    WBC 7.5 2021    HGB 13.6 2021     2021       BMP:    Lab Results   Component Value Date     2021    K 4.2 2021     2021    CO2 23 2021    BUN 20 2021    CREATININE 0.54 2021    GLUCOSE 108 2021       HEMOGLOBIN A1C:   Lab Results   Component Value Date    LABA1C 5.2 2018       FASTING LIPID PANEL:  Lab Results   Component Value Date    CHOL 172 2019    HDL 52 2019    TRIG 294 2019         RECORD REVIEW: Previous medical records were reviewed at today's visit. PHYSICAL EXAMINATION:    Vital Signs: (As obtained by patient/caregiver at home)    Patient-Reported Vitals 2021   Patient-Reported Weight -   Patient-Reported Height -   Patient-Reported Systolic 624   Patient-Reported Diastolic 68   Patient-Reported Temperature -   Patient-Reported SpO2 -          Physical Exam  Constitutional:       Appearance: Normal appearance. She is not ill-appearing, toxic-appearing or diaphoretic. HENT:      Head: Normocephalic. Right Ear: External ear normal.      Left Ear: External ear normal.      Nose: Nose normal.      Mouth/Throat:      Mouth: Mucous membranes are moist.   Eyes:      General: No scleral icterus. Right eye: No discharge. Left eye: No discharge. Conjunctiva/sclera: Conjunctivae normal.   Pulmonary:      Effort: Pulmonary effort is normal.   Musculoskeletal:      Cervical back: Normal range of motion. Skin:     Coloration: Skin is not jaundiced. Neurological:      Mental Status: She is alert and oriented to person, place, and time. Psychiatric:         Mood and Affect: Mood normal.         Behavior: Behavior normal.         Thought Content: Thought content normal.               RECORD REVIEW: Previous medical records were reviewed at today's visit. The past family, medical and social histories were reviewed and unchanged with the exceptions of what is mentioned in this note. Due to this being a TeleHealth encounter, evaluation of the following organ systems is limited: Vitals/Constitutional/EENT/Resp/CV/GI//MS/Neuro/Skin/Heme-Lymph-Imm. ASSESSMENT/PLAN:  Rochelle Escobedo was seen today for follow-up and medication refill. Diagnoses and all orders for this visit:    Hip pain, bilateral    Chronic obstructive pulmonary disease, unspecified COPD type ([facility]  -     fluticasone-vilanterol (BREO ELLIPTA) 100-25 MCG/INH AEPB inhaler; Inhale 1 puff into the lungs daily    Other orders  -     albuterol sulfate  (90 Base) MCG/ACT inhaler; inhale 1 puff by mouth and INTO THE LUNGS every 4 hours if needed for wheezing      Doing well without concerns today. Encouraged use of compression stockings for postoperative swelling. No unilateral swelling, chest pain, shortness of breath. Return in about 5 months (around 4/4/2022) for Asthma (in office, possible pre-op exam at that time). An  electronic signature was used to authenticate this note. --LAWRENCE Barrera - CNP on 11/4/2021 at 2:03 PM    This note is created with the assistance of a speech-recognition program. While intending to generate a document that actually reflects the content of the visit, the document can still have some mistakes which may not have been identified and corrected by editing. 9    Pursuant to the emergency declaration under the [facility] waiver authority and the [employer] and [employer] Act, this Virtual  Visit was conducted, with patient's consent, to reduce the

## 2021-11-04 NOTE — PROGRESS NOTES
Visit Information    Have you changed or started any medications since your last visit including any over-the-counter medicines, vitamins, or herbal medicines? no   Are you having any side effects from any of your medications? -  no  Have you stopped taking any of your medications? Is so, why? -  no    Have you seen any other physician or provider since your last visit? Yes - Records Obtained  Have you had any other diagnostic tests since your last visit? Yes - Records Obtained  Have you been seen in the emergency room and/or had an admission to a hospital since we last saw you? No  Have you had your routine dental cleaning in the past 6 months? no    Have you activated your [employer] account? If not, what are your barriers?  Yes     Patient Care Team:  LAWRENCE Velasquez CNP as PCP - General (Family Medicine)  LAWRENCE Velasquez CNP as PCP - [facility] Provider  Toribio Spaulding MD as Consulting Physician (Gastroenterology)    Medical History Review  Past Medical, Family, and Social History reviewed and does not contribute to the patient presenting condition    Health Maintenance   Topic Date Due    Hepatitis C screen  Never done    HIV screen  Never done    Cervical cancer screen  Never done    Colon cancer screen colonoscopy  Never done    Breast cancer screen  Never done    Shingles Vaccine (1 of 2) Never done    Low dose CT lung screening  09/25/2020    Annual Wellness Visit (AWV)  Never done    Diabetes screen  08/06/2021    Flu vaccine (1) 09/01/2021    COVID-19 Vaccine (3 - Pfizer booster) 11/20/2021    Potassium monitoring  06/29/2022    Creatinine monitoring  06/29/2022    Lipid screen  04/11/2024    DTaP/Tdap/Td vaccine (2 - Td or Tdap) 08/18/2027    Pneumococcal 0-64 years Vaccine (2 of 2 - PPSV23) 09/25/2035    Hepatitis A vaccine  Aged Out    Hepatitis B vaccine  Aged Out    Hib vaccine  Aged Out    Meningococcal (ACWY) vaccine  Aged Out

## 2021-11-08 ENCOUNTER — PATIENT MESSAGE (OUTPATIENT)
Dept: PRIMARY CARE CLINIC | Age: 51
End: 2021-11-08

## 2021-11-08 DIAGNOSIS — M25.551 HIP PAIN, BILATERAL: ICD-10-CM

## 2021-11-08 DIAGNOSIS — J44.9 CHRONIC OBSTRUCTIVE PULMONARY DISEASE, UNSPECIFIED COPD TYPE (HCC): Primary | ICD-10-CM

## 2021-11-08 DIAGNOSIS — E66.01 MORBID OBESITY WITH BODY MASS INDEX (BMI) OF 50.0 TO 59.9 IN ADULT (HCC): ICD-10-CM

## 2021-11-08 DIAGNOSIS — M25.552 HIP PAIN, BILATERAL: ICD-10-CM

## 2021-11-08 NOTE — TELEPHONE ENCOUNTER
From: Mallory Leon  To: Vickieabimael Lopez  Sent: 11/8/2021 4:23 AM EST  Subject: Prescription Question    I forgot to ask for a prescription for a bariatric shower bench. I need one that has a bench that starts outside the tub so I can slide over to get into the tub. I have attached a picture of what I am looking for. Can you please fax it to the Pharmacy Counter. If you need to speak to me call me at [phone].      Thank you,  Brain Webb

## 2021-11-19 ENCOUNTER — TELEPHONE (OUTPATIENT)
Dept: PRIMARY CARE CLINIC | Age: 51
End: 2021-11-19

## 2021-11-19 RX ORDER — LEVALBUTEROL TARTRATE 45 UG/1
1 AEROSOL, METERED ORAL EVERY 4 HOURS PRN
Qty: 1 EACH | Refills: 3 | Status: SHIPPED | OUTPATIENT
Start: 2021-11-19 | End: 2022-04-22

## 2021-11-19 NOTE — TELEPHONE ENCOUNTER
Patient called stating her insurance will no longer pay for the Albuterol inhaler. They will cover proair but she state it never helps her.  She would like Xopenex Inhaler

## 2021-11-19 NOTE — TELEPHONE ENCOUNTER
Pt informed medication will be ordered and there is a possibility insurance will not cover and may need a prior auth. Pt voiced understanding.

## 2021-12-13 ENCOUNTER — TELEPHONE (OUTPATIENT)
Dept: PRIMARY CARE CLINIC | Age: 51
End: 2021-12-13

## 2021-12-23 ENCOUNTER — APPOINTMENT (OUTPATIENT)
Dept: CT IMAGING | Age: 51
End: 2021-12-23
Payer: COMMERCIAL

## 2021-12-23 ENCOUNTER — HOSPITAL ENCOUNTER (OUTPATIENT)
Age: 51
Setting detail: OBSERVATION
Discharge: HOME | End: 2021-12-24
Attending: EMERGENCY MEDICINE | Admitting: EMERGENCY MEDICINE
Payer: COMMERCIAL

## 2021-12-23 ENCOUNTER — APPOINTMENT (OUTPATIENT)
Dept: GENERAL RADIOLOGY | Age: 51
End: 2021-12-23
Payer: COMMERCIAL

## 2021-12-23 DIAGNOSIS — J44.1 COPD EXACERBATION (HCC): Primary | ICD-10-CM

## 2021-12-23 PROBLEM — J44.9 COPD (CHRONIC OBSTRUCTIVE PULMONARY DISEASE) (HCC): Status: ACTIVE | Noted: 2021-12-23

## 2021-12-23 LAB
ABSOLUTE EOS #: 1.25 K/UL (ref 0–0.44)
ABSOLUTE IMMATURE GRANULOCYTE: 0.04 K/UL (ref 0–0.3)
ABSOLUTE LYMPH #: 3.97 K/UL (ref 1.1–3.7)
ABSOLUTE MONO #: 0.65 K/UL (ref 0.1–1.2)
ALLEN TEST: ABNORMAL
ANION GAP SERPL CALCULATED.3IONS-SCNC: 16 MMOL/L (ref 9–17)
BASOPHILS # BLD: 1 % (ref 0–2)
BASOPHILS ABSOLUTE: 0.08 K/UL (ref 0–0.2)
BNP INTERPRETATION: NORMAL
BUN BLDV-MCNC: 15 MG/DL (ref 6–20)
BUN/CREAT BLD: ABNORMAL (ref 9–20)
CALCIUM SERPL-MCNC: 9.8 MG/DL (ref 8.6–10.4)
CARBOXYHEMOGLOBIN: 3.3 % (ref 0–5)
CHLORIDE BLD-SCNC: 103 MMOL/L (ref 98–107)
CO2: 21 MMOL/L (ref 20–31)
CREAT SERPL-MCNC: 0.7 MG/DL (ref 0.5–0.9)
D-DIMER QUANTITATIVE: 1.49 MG/L FEU
DIFFERENTIAL TYPE: ABNORMAL
EOSINOPHILS RELATIVE PERCENT: 15 % (ref 1–4)
FIO2: ABNORMAL
GFR AFRICAN AMERICAN: >60 ML/MIN
GFR NON-AFRICAN AMERICAN: >60 ML/MIN
GFR SERPL CREATININE-BSD FRML MDRD: ABNORMAL ML/MIN/{1.73_M2}
GFR SERPL CREATININE-BSD FRML MDRD: ABNORMAL ML/MIN/{1.73_M2}
GLUCOSE BLD-MCNC: 146 MG/DL (ref 70–99)
HCO3 VENOUS: 26.8 MMOL/L (ref 24–30)
HCT VFR BLD CALC: 44.7 % (ref 36.3–47.1)
HEMOGLOBIN: 14.7 G/DL (ref 11.9–15.1)
IMMATURE GRANULOCYTES: 1 %
LYMPHOCYTES # BLD: 46 % (ref 24–43)
MCH RBC QN AUTO: 32.9 PG (ref 25.2–33.5)
MCHC RBC AUTO-ENTMCNC: 32.9 G/DL (ref 28.4–34.8)
MCV RBC AUTO: 100 FL (ref 82.6–102.9)
METHEMOGLOBIN: ABNORMAL % (ref 0–1.5)
MODE: ABNORMAL
MONOCYTES # BLD: 8 % (ref 3–12)
NEGATIVE BASE EXCESS, VEN: 0.6 MMOL/L (ref 0–2)
NOTIFICATION TIME: ABNORMAL
NOTIFICATION: ABNORMAL
NRBC AUTOMATED: 0 PER 100 WBC
O2 DEVICE/FLOW/%: ABNORMAL
O2 SAT, VEN: 74 % (ref 60–85)
OXYHEMOGLOBIN: ABNORMAL % (ref 95–98)
PATIENT TEMP: 37
PCO2, VEN, TEMP ADJ: ABNORMAL MMHG (ref 39–55)
PCO2, VEN: 57.3 (ref 39–55)
PDW BLD-RTO: 13.3 % (ref 11.8–14.4)
PEEP/CPAP: ABNORMAL
PH VENOUS: 7.29 (ref 7.32–7.42)
PH, VEN, TEMP ADJ: ABNORMAL (ref 7.32–7.42)
PLATELET # BLD: 237 K/UL (ref 138–453)
PLATELET ESTIMATE: ABNORMAL
PMV BLD AUTO: 10.4 FL (ref 8.1–13.5)
PO2, VEN, TEMP ADJ: ABNORMAL MMHG (ref 30–50)
PO2, VEN: 43.4 (ref 30–50)
POSITIVE BASE EXCESS, VEN: ABNORMAL MMOL/L (ref 0–2)
POTASSIUM SERPL-SCNC: 4.2 MMOL/L (ref 3.7–5.3)
PRO-BNP: <20 PG/ML
PSV: ABNORMAL
PT. POSITION: ABNORMAL
RBC # BLD: 4.47 M/UL (ref 3.95–5.11)
RBC # BLD: ABNORMAL 10*6/UL
RESPIRATORY RATE: ABNORMAL
SAMPLE SITE: ABNORMAL
SARS-COV-2, RAPID: NOT DETECTED
SEG NEUTROPHILS: 29 % (ref 36–65)
SEGMENTED NEUTROPHILS ABSOLUTE COUNT: 2.48 K/UL (ref 1.5–8.1)
SET RATE: ABNORMAL
SODIUM BLD-SCNC: 140 MMOL/L (ref 135–144)
SPECIMEN DESCRIPTION: NORMAL
TEXT FOR RESPIRATORY: ABNORMAL
TOTAL HB: ABNORMAL G/DL (ref 12–16)
TOTAL RATE: ABNORMAL
TROPONIN INTERP: NORMAL
TROPONIN T: NORMAL NG/ML
TROPONIN, HIGH SENSITIVITY: <6 NG/L (ref 0–14)
VT: ABNORMAL
WBC # BLD: 8.5 K/UL (ref 3.5–11.3)
WBC # BLD: ABNORMAL 10*3/UL

## 2021-12-23 PROCEDURE — 96375 TX/PRO/DX INJ NEW DRUG ADDON: CPT

## 2021-12-23 PROCEDURE — 99285 EMERGENCY DEPT VISIT HI MDM: CPT

## 2021-12-23 PROCEDURE — 84484 ASSAY OF TROPONIN QUANT: CPT

## 2021-12-23 PROCEDURE — 80048 BASIC METABOLIC PNL TOTAL CA: CPT

## 2021-12-23 PROCEDURE — 96366 THER/PROPH/DIAG IV INF ADDON: CPT

## 2021-12-23 PROCEDURE — 6370000000 HC RX 637 (ALT 250 FOR IP): Performed by: STUDENT IN AN ORGANIZED HEALTH CARE EDUCATION/TRAINING PROGRAM

## 2021-12-23 PROCEDURE — 2700000000 HC OXYGEN THERAPY PER DAY

## 2021-12-23 PROCEDURE — 71260 CT THORAX DX C+: CPT

## 2021-12-23 PROCEDURE — 82805 BLOOD GASES W/O2 SATURATION: CPT

## 2021-12-23 PROCEDURE — 94660 CPAP INITIATION&MGMT: CPT

## 2021-12-23 PROCEDURE — 2580000003 HC RX 258: Performed by: STUDENT IN AN ORGANIZED HEALTH CARE EDUCATION/TRAINING PROGRAM

## 2021-12-23 PROCEDURE — 85379 FIBRIN DEGRADATION QUANT: CPT

## 2021-12-23 PROCEDURE — 96365 THER/PROPH/DIAG IV INF INIT: CPT

## 2021-12-23 PROCEDURE — 93005 ELECTROCARDIOGRAM TRACING: CPT

## 2021-12-23 PROCEDURE — 85025 COMPLETE CBC W/AUTO DIFF WBC: CPT

## 2021-12-23 PROCEDURE — 6360000004 HC RX CONTRAST MEDICATION: Performed by: STUDENT IN AN ORGANIZED HEALTH CARE EDUCATION/TRAINING PROGRAM

## 2021-12-23 PROCEDURE — G0378 HOSPITAL OBSERVATION PER HR: HCPCS

## 2021-12-23 PROCEDURE — 6360000002 HC RX W HCPCS: Performed by: STUDENT IN AN ORGANIZED HEALTH CARE EDUCATION/TRAINING PROGRAM

## 2021-12-23 PROCEDURE — 71045 X-RAY EXAM CHEST 1 VIEW: CPT

## 2021-12-23 PROCEDURE — 83880 ASSAY OF NATRIURETIC PEPTIDE: CPT

## 2021-12-23 PROCEDURE — 87635 SARS-COV-2 COVID-19 AMP PRB: CPT

## 2021-12-23 PROCEDURE — 36415 COLL VENOUS BLD VENIPUNCTURE: CPT

## 2021-12-23 RX ORDER — DOXYCYCLINE HYCLATE 100 MG
100 TABLET ORAL ONCE
Status: DISCONTINUED | OUTPATIENT
Start: 2021-12-23 | End: 2021-12-23

## 2021-12-23 RX ORDER — FLUTICASONE FUROATE AND VILANTEROL 100; 25 UG/1; UG/1
1 POWDER RESPIRATORY (INHALATION) DAILY
Status: DISCONTINUED | OUTPATIENT
Start: 2021-12-23 | End: 2021-12-23

## 2021-12-23 RX ORDER — POLYETHYLENE GLYCOL 3350 17 G/17G
17 POWDER, FOR SOLUTION ORAL DAILY PRN
Status: DISCONTINUED | OUTPATIENT
Start: 2021-12-23 | End: 2021-12-24 | Stop reason: HOSPADM

## 2021-12-23 RX ORDER — POTASSIUM CHLORIDE 20 MEQ/1
40 TABLET, EXTENDED RELEASE ORAL PRN
Status: DISCONTINUED | OUTPATIENT
Start: 2021-12-23 | End: 2021-12-24 | Stop reason: HOSPADM

## 2021-12-23 RX ORDER — ALPRAZOLAM 0.25 MG/1
0.5 TABLET ORAL ONCE
Status: COMPLETED | OUTPATIENT
Start: 2021-12-23 | End: 2021-12-23

## 2021-12-23 RX ORDER — BACLOFEN 10 MG/1
20 TABLET ORAL 3 TIMES DAILY
Status: DISCONTINUED | OUTPATIENT
Start: 2021-12-24 | End: 2021-12-24 | Stop reason: HOSPADM

## 2021-12-23 RX ORDER — ALBUTEROL SULFATE 90 UG/1
1 AEROSOL, METERED RESPIRATORY (INHALATION) 4 TIMES DAILY
Status: DISCONTINUED | OUTPATIENT
Start: 2021-12-23 | End: 2021-12-24 | Stop reason: HOSPADM

## 2021-12-23 RX ORDER — BACLOFEN 10 MG/1
10 TABLET ORAL ONCE
Status: DISCONTINUED | OUTPATIENT
Start: 2021-12-23 | End: 2021-12-23

## 2021-12-23 RX ORDER — ALPRAZOLAM 0.25 MG/1
0.5 TABLET ORAL NIGHTLY PRN
Status: DISCONTINUED | OUTPATIENT
Start: 2021-12-23 | End: 2021-12-24 | Stop reason: HOSPADM

## 2021-12-23 RX ORDER — BACLOFEN 10 MG/1
10 TABLET ORAL 3 TIMES DAILY
Status: DISCONTINUED | OUTPATIENT
Start: 2021-12-23 | End: 2021-12-23

## 2021-12-23 RX ORDER — ACETAMINOPHEN 325 MG/1
650 TABLET ORAL EVERY 6 HOURS PRN
Status: DISCONTINUED | OUTPATIENT
Start: 2021-12-23 | End: 2021-12-24 | Stop reason: HOSPADM

## 2021-12-23 RX ORDER — VENLAFAXINE HYDROCHLORIDE 150 MG/1
150 CAPSULE, EXTENDED RELEASE ORAL
Status: DISCONTINUED | OUTPATIENT
Start: 2021-12-24 | End: 2021-12-23

## 2021-12-23 RX ORDER — LORAZEPAM 2 MG/ML
1 INJECTION INTRAMUSCULAR ONCE
Status: COMPLETED | OUTPATIENT
Start: 2021-12-23 | End: 2021-12-23

## 2021-12-23 RX ORDER — LEVOFLOXACIN 5 MG/ML
750 INJECTION, SOLUTION INTRAVENOUS EVERY 24 HOURS
Status: DISCONTINUED | OUTPATIENT
Start: 2021-12-23 | End: 2021-12-24 | Stop reason: HOSPADM

## 2021-12-23 RX ORDER — ONDANSETRON 2 MG/ML
4 INJECTION INTRAMUSCULAR; INTRAVENOUS EVERY 4 HOURS PRN
Status: DISCONTINUED | OUTPATIENT
Start: 2021-12-23 | End: 2021-12-24 | Stop reason: HOSPADM

## 2021-12-23 RX ORDER — SODIUM CHLORIDE 9 MG/ML
25 INJECTION, SOLUTION INTRAVENOUS PRN
Status: DISCONTINUED | OUTPATIENT
Start: 2021-12-23 | End: 2021-12-24 | Stop reason: HOSPADM

## 2021-12-23 RX ORDER — SODIUM CHLORIDE 9 MG/ML
INJECTION, SOLUTION INTRAVENOUS CONTINUOUS
Status: DISCONTINUED | OUTPATIENT
Start: 2021-12-23 | End: 2021-12-24 | Stop reason: HOSPADM

## 2021-12-23 RX ORDER — LEVOFLOXACIN 5 MG/ML
500 INJECTION, SOLUTION INTRAVENOUS ONCE
Status: COMPLETED | OUTPATIENT
Start: 2021-12-23 | End: 2021-12-24

## 2021-12-23 RX ORDER — SODIUM CHLORIDE 0.9 % (FLUSH) 0.9 %
5-40 SYRINGE (ML) INJECTION PRN
Status: DISCONTINUED | OUTPATIENT
Start: 2021-12-23 | End: 2021-12-24 | Stop reason: HOSPADM

## 2021-12-23 RX ORDER — PREDNISONE 20 MG/1
60 TABLET ORAL DAILY
Status: DISCONTINUED | OUTPATIENT
Start: 2021-12-23 | End: 2021-12-24 | Stop reason: HOSPADM

## 2021-12-23 RX ORDER — PREDNISONE 20 MG/1
60 TABLET ORAL ONCE
Status: DISCONTINUED | OUTPATIENT
Start: 2021-12-23 | End: 2021-12-24 | Stop reason: HOSPADM

## 2021-12-23 RX ORDER — PREDNISONE 20 MG/1
50 TABLET ORAL ONCE
Status: DISCONTINUED | OUTPATIENT
Start: 2021-12-23 | End: 2021-12-23

## 2021-12-23 RX ORDER — BUDESONIDE AND FORMOTEROL FUMARATE DIHYDRATE 80; 4.5 UG/1; UG/1
2 AEROSOL RESPIRATORY (INHALATION) 2 TIMES DAILY
Status: DISCONTINUED | OUTPATIENT
Start: 2021-12-23 | End: 2021-12-24 | Stop reason: HOSPADM

## 2021-12-23 RX ORDER — GABAPENTIN 600 MG/1
600 TABLET ORAL 3 TIMES DAILY
Status: DISCONTINUED | OUTPATIENT
Start: 2021-12-23 | End: 2021-12-24 | Stop reason: HOSPADM

## 2021-12-23 RX ORDER — CARBAMAZEPINE 200 MG/1
400 TABLET ORAL NIGHTLY
Status: DISCONTINUED | OUTPATIENT
Start: 2021-12-23 | End: 2021-12-24 | Stop reason: HOSPADM

## 2021-12-23 RX ORDER — ALBUTEROL SULFATE 2.5 MG/3ML
2.5 SOLUTION RESPIRATORY (INHALATION) EVERY 4 HOURS PRN
Status: DISCONTINUED | OUTPATIENT
Start: 2021-12-23 | End: 2021-12-24 | Stop reason: HOSPADM

## 2021-12-23 RX ORDER — POTASSIUM CHLORIDE 7.45 MG/ML
10 INJECTION INTRAVENOUS PRN
Status: DISCONTINUED | OUTPATIENT
Start: 2021-12-23 | End: 2021-12-24 | Stop reason: HOSPADM

## 2021-12-23 RX ORDER — VENLAFAXINE HYDROCHLORIDE 150 MG/1
150 CAPSULE, EXTENDED RELEASE ORAL DAILY
Status: DISCONTINUED | OUTPATIENT
Start: 2021-12-23 | End: 2021-12-24 | Stop reason: HOSPADM

## 2021-12-23 RX ORDER — SODIUM CHLORIDE 0.9 % (FLUSH) 0.9 %
5-40 SYRINGE (ML) INJECTION EVERY 12 HOURS SCHEDULED
Status: DISCONTINUED | OUTPATIENT
Start: 2021-12-23 | End: 2021-12-24 | Stop reason: HOSPADM

## 2021-12-23 RX ORDER — ACETAMINOPHEN 650 MG/1
650 SUPPOSITORY RECTAL EVERY 6 HOURS PRN
Status: DISCONTINUED | OUTPATIENT
Start: 2021-12-23 | End: 2021-12-24 | Stop reason: HOSPADM

## 2021-12-23 RX ORDER — BACLOFEN 10 MG/1
10 TABLET ORAL ONCE
Status: COMPLETED | OUTPATIENT
Start: 2021-12-23 | End: 2021-12-24

## 2021-12-23 RX ADMIN — VENLAFAXINE HYDROCHLORIDE 150 MG: 150 CAPSULE, EXTENDED RELEASE ORAL at 22:28

## 2021-12-23 RX ADMIN — PREDNISONE 60 MG: 20 TABLET ORAL at 22:27

## 2021-12-23 RX ADMIN — LEVOFLOXACIN 750 MG: 5 INJECTION, SOLUTION INTRAVENOUS at 23:59

## 2021-12-23 RX ADMIN — CARBAMAZEPINE 400 MG: 200 TABLET ORAL at 22:27

## 2021-12-23 RX ADMIN — BACLOFEN 10 MG: 10 TABLET ORAL at 22:26

## 2021-12-23 RX ADMIN — ALPRAZOLAM 0.5 MG: 0.25 TABLET ORAL at 22:33

## 2021-12-23 RX ADMIN — GABAPENTIN 600 MG: 600 TABLET ORAL at 22:26

## 2021-12-23 RX ADMIN — LEVOFLOXACIN 500 MG: 5 INJECTION, SOLUTION INTRAVENOUS at 22:28

## 2021-12-23 RX ADMIN — ALPRAZOLAM 0.5 MG: 0.25 TABLET ORAL at 15:15

## 2021-12-23 RX ADMIN — SODIUM CHLORIDE: 9 INJECTION, SOLUTION INTRAVENOUS at 22:29

## 2021-12-23 RX ADMIN — IOPAMIDOL 85 ML: 755 INJECTION, SOLUTION INTRAVENOUS at 17:27

## 2021-12-23 RX ADMIN — LORAZEPAM 1 MG: 2 INJECTION INTRAMUSCULAR; INTRAVENOUS at 14:31

## 2021-12-23 ASSESSMENT — PAIN DESCRIPTION - LOCATION: LOCATION: ABDOMEN

## 2021-12-23 ASSESSMENT — PAIN DESCRIPTION - PAIN TYPE: TYPE: ACUTE PAIN

## 2021-12-23 ASSESSMENT — PAIN SCALES - GENERAL: PAINLEVEL_OUTOF10: 7

## 2021-12-23 NOTE — ED PROVIDER NOTES
[address] [address] [facility]     Emergency Department     Faculty Attestation        I performed a history and physical examination of the patient and discussed management with the resident. I reviewed the residents note and agree with the documented findings and plan of care. Any areas of disagreement are noted on the chart. I was personally present for the key portions of any procedures. I have documented in the chart those procedures where I was not present during the key portions. I have reviewed the emergency nurses triage note. I agree with the chief complaint, past medical history, past surgical history, allergies, medications, social and family history as documented unless otherwise noted below. For Physician Assistant/ Nurse Practitioner cases/documentation I have personally evaluated this patient and have completed at least one if not all key elements of the E/M (history, physical exam, and MDM). Additional findings are as noted. Vital Signs: BP: (!) 121/92  Pulse: 126  Resp: (!) 33  Temp: 98.3 °F (36.8 °C) SpO2: (!) 88 %  PCP:  Obi Chambers, LAWRENCE - CNP    Pertinent Comments:     Patient is a 80-year-old female with long history of COPD who presents after a few days of worsening shortness of breath and wheezing. Admits to some nasal congestion but nothing else URI-like. Arrives satting high 70%s, placed on BiPAP. Now saturating 98 to 99%. Patient still tachypneic however but is improving. Denies any chest pain associated and there is been no vomiting or diarrhea or abdominal pain. Just shortness of breath per the patient. Does have wheezing bilaterally as well. Assessment/plan: Patient with COPD exacerbation requiring BiPAP and will obtain multiple laboratories as well as screening D-dimer and obviously Covid swab.    Admission after      EKG Interpretation    Interpreted by emergency department physician    Rhythm: Sinus rhythm, but

## 2021-12-23 NOTE — ED PROVIDER NOTES
[address]  ED  Emergency Department Encounter  Emergency Medicine Resident     Pt Name: Remy Muniz  MRN: [id number]  Thorgftanya 1970  Date of evaluation: 12/23/21  PCP:  LAWRENCE Reynolds [id number]       Chief Complaint   Patient presents with    Shortness of Breath       HISTORY OFPRESENT ILLNESS  (Location/Symptom, Timing/Onset, Context/Setting, Quality, Duration, Modifying Factors,Severity.)      Remy Muniz is a 46 y.o. female who presents with concerns for acute onset shortness of breath. Patient is notably hypoxic into the low 70s as per EMS. Upon initial evaluation patient is in acute distress the BiPAP on. Patient states that she has a history of COPD, familial history of blood clots. Patient states that she has not had a COPD exacerbation like this in quite some time. Patient denies fever, chills, lightheadedness, dizziness, patient has received both Covid vaccinations. Limited review of history as well as her limited history of present illness upon initial evaluation secondary to acute distress. PAST MEDICAL / SURGICAL / SOCIAL / FAMILY HISTORY      has a past medical history of Acute exacerbation of COPD with asthma ([address] Anxiety, Asthma, Benign essential HTN, Bipolar 1 disorder ([address] COPD (chronic obstructive pulmonary disease) ([address] Depression, Fibromyalgia, Headache(784.0), Hip arthritis, Hyperlipidemia, Obesity, Pneumonia due to infectious organism, and Post traumatic stress disorder. has a past surgical history that includes Tonsillectomy; Cholecystectomy; hc inject other perphrl nerv (Bilateral, 12/5/2019); Nerve Block (Bilateral, 01/09/2020); and Anesthesia Nerve Block (N/A, 1/9/2020). Social History     Socioeconomic History    Marital status:       Spouse name: Not on file    Number of children: Not on file    Years of education: Not on file    Highest education level: Not on file   Occupational History    Not on file Tobacco Use    Smoking status: Former Smoker     Packs/day: 2.00     Years: 20.00     Pack years: 40.00     Types: Cigarettes     Quit date: 1/10/2010     Years since quittin.9    Smokeless tobacco: Never Used   Vaping Use    Vaping Use: Never used   Substance and Sexual Activity    Alcohol use: Yes     Comment: rare    Drug use: No    Sexual activity: Not on file   Other Topics Concern    Not on file   Social History Narrative    Not on file     Social Determinants of Health     Financial Resource Strain: Low Risk     Difficulty of Paying Living Expenses: Not hard at all   Food Insecurity: No Food Insecurity    Worried About Running Out of Food in the Last Year: Never true    Osmin of Food in the Last Year: Never true   Transportation Needs: No Transportation Needs    Lack of Transportation (Medical): No    Lack of Transportation (Non-Medical):  No   Physical Activity:     Days of Exercise per Week: Not on file    Minutes of Exercise per Session: Not on file   Stress:     Feeling of Stress : Not on file   Social Connections:     Frequency of Communication with Friends and Family: Not on file    Frequency of Social Gatherings with Friends and Family: Not on file    Attends Rastafarian Services: Not on file    Active Member of [address] [place] [address] [employer] or Organizations: Not on file    Attends Club or Organization Meetings: Not on file    Marital Status: Not on file   Intimate Partner Violence:     Fear of Current or Ex-Partner: Not on file    Emotionally Abused: Not on file    Physically Abused: Not on file    Sexually Abused: Not on file   Housing Stability:     Unable to Pay for Housing in the Last Year: Not on file    Number of [place] in the Last Year: Not on file    Unstable Housing in the Last Year: Not on file       Family History   Problem Relation Age of Onset    Hypertension Mother     Heart Attack Mother     Heart Disease Mother     Hypertension Father     Heart Disease Father    Anshul Mauricio Diabetes Other         cousin    Heart Disease Brother         Allergies:  Food, Penicillins, Parafon forte dsc [chlorzoxazone], and Biaxin [clarithromycin]    Home Medications:  Prior to Admission medications    Medication Sig Start Date End Date Taking? Authorizing Provider   [facility] HFA) 45 MCG/ACT inhaler Inhale 1 puff into the lungs every 4 hours as needed for Wheezing 11/19/21 11/19/22  LAWRENCE Flores - GOMEZ   fluticasone-vilanterol (BREO ELLIPTA) 100-25 MCG/INH AEPB inhaler Inhale 1 puff into the lungs daily 11/4/21 2/2/22  LAWRENCE Starks CNP   albuterol sulfate  (90 Base) MCG/ACT inhaler inhale 1 puff by mouth and INTO THE LUNGS every 4 hours if needed for wheezing 11/4/21   LAWRENCE Starks CNP   carBAMazepine (TEGRETOL) 200 MG tablet take 1 tablet by mouth at bedtime for 4 days then take 2 tablets . ..  (REFER TO PRESCRIPTION NOTES). 5/25/21   Historical Provider, MD   gabapentin (NEURONTIN) 600 MG tablet Take 600 mg by mouth 3 times daily. Historical Provider, MD   baclofen (LIORESAL) 10 MG tablet Take 1 tablet by mouth 3 times daily 6/25/20   Shayne Khoury MD   VITAMIN D HIGH POTENCY 25 MCG (1000 UT) CAPS take 2 capsules by mouth once daily 10/22/19   Historical Provider, MD   albuterol (PROVENTIL) (2.5 MG/3ML) 0.083% nebulizer solution Take 3 mLs by nebulization every 4 hours as needed for Wheezing 5/1/19   LAWRENCE Starks CNP   venlafaxine (EFFEXOR XR) 150 MG extended release capsule  6/17/18   Historical Provider, MD   ALPRAZolam Dakotah Files) 0.5 MG tablet Take 0.5 mg by mouth nightly as needed for Sleep.     Historical Provider, MD       REVIEW OFSYSTEMS    (2-9 systems for level 4, 10 or more for level 5)      Review of Systems   Unable to perform ROS: Acuity of condition       PHYSICAL EXAM   (up to 7 for level 4, 8 or more forlevel 5)      INITIAL VITALS:   ED Triage Vitals   BP Temp Temp src Pulse Resp SpO2 Height Weight   -- -- -- -- -- -- -- -- mg    ALPRAZolam (XANAX) tablet 0.5 mg     Order Specific Question:   Please select a reason the therapeutic interchange was not accepted: Answer: Other (Please Comment)    iopamidol (ISOVUE-370) 76 % injection 85 mL       DDX: Awake, pneumonia, COPD exacerbation, pulmonary embolism, angina    Initial MDM/Plan/ED COURSE:    46 y.o. female who presents with concerns for acute onset shortness of breath. Patient is notably hypoxic into the low 70s as per EMS. Upon initial evaluation patient is in acute distress the BiPAP on. Patient states that she has a history of COPD, familial history of blood clots. Patient states that she has not had a COPD exacerbation like this in quite some time. Patient denies fever, chills, lightheadedness, dizziness, patient has received both Covid vaccinations. Limited review of history as well as her limited history of present illness upon initial evaluation secondary to acute distress. Plan to get a chest x-ray to assess for pneumothorax, pneumonia, plan to get troponin and BMP in order to assess for acute cardiac distress, potential exacerbation of congestive heart failure. Plan check electrolytes and get a D-dimer. ED Course as of 12/23/21 1715   Thu Dec 23, 2021   1441 Patient seen and reevaluated, patient took off her BiPAP. Patient currently on 5 L nasal cannula saturating 90%. Patient adamantly refusing BiPAP. Patient is agreeable to x-ray at this time. [GP]   3150 D-Dimer, Quant: 1.49 [GP]   1452 Patient refusing x-ray, verbally aggressive towards medical staff and Arin Farmer stated that she does not want speak to me and does not want to be placed on bipap. Patient has adequate oxygen saturation on 5l nasal cannula, has increased aeration post bipap. Patient made aware that her D-dimer is positive, spoke of risk of potential pulmonary embolism.   I explained that the D-dimer is a very sensitive but not very specific test, stating that a positive D-dimer does not mean that she has blood clots but that it does leave me to be more concerned for a potential pulmonary embolism at that I would recommend further assessment with a CT scan for potential pulmonary embolism. I told patient that we need to get a CT scan of the chest to look at the blood vessels going from the heart to the lungs. Explained to patient what a CT scanner is, she stated that she will most likely freak out on the entirety of the healthcare staff when attempting to go through [employer] 5. Patient agreeable to attempt a CT scan.  [GP]   1456 pCO2, Dakota(!): 57.3  Patient chronic COPD, low concerns for hypercapnea  [GP]   1511   IMPRESSION:  Pulmonary vascular congestion.     No definite acute infiltrate, but examination is limited.    [GP]   1648 Creatinine: 0.70 [GP]   1649 BUN: 15  Patient able to get CT PE, order in at this time  [GP]      ED Course User Index  [GP] Ratna Brito MD   Patient cannot to Dr. Jacy Rodgersbe:     DIAGNOSTIC RESULTS / Luz Elena Quiros / [facility]     LABS:  Labs Reviewed   CBC WITH AUTO DIFFERENTIAL - Abnormal; Notable for the following components:       Result Value    Seg Neutrophils 29 (*)     Lymphocytes 46 (*)     Eosinophils % 15 (*)     Immature Granulocytes 1 (*)     Absolute Lymph # 3.97 (*)     Absolute Eos # 1.25 (*)     All other components within normal limits   BLOOD GAS, VENOUS - Abnormal; Notable for the following components:    pH, [place] 7.292 (*)     pCO2, [place] 57.3 (*)     All other components within normal limits   BASIC METABOLIC PANEL - Abnormal; Notable for the following components:    Glucose 146 (*)     All other components within normal limits   COVID-19, RAPID   TROPONIN   BRAIN NATRIURETIC PEPTIDE   D-DIMER, QUANTITATIVE   BASIC METABOLIC PANEL           XR CHEST PORTABLE    Result Date: 12/23/2021  EXAMINATION: ONE XRAY VIEW OF THE CHEST 12/23/2021 11:10 am COMPARISON: 09/25/2019 HISTORY: ORDERING SYSTEM PROVIDED HISTORY: concern for copd exacerbation TECHNOLOGIST PROVIDED HISTORY: concern for copd exacerbation FINDINGS: Examination is limited by the patient's body habitus and by portable technique. Pulmonary vasculature is congested. Definite focal infiltrate is not identified, but the retrocardiac region is not well evaluated. No pneumothorax is seen. No free air. No acute bony abnormality. Pulmonary vascular congestion. No definite acute infiltrate, but examination is limited. PROCEDURES:  None    CONSULTS:  None    CRITICAL CARE:  Please see attending note    FINAL IMPRESSION      1. Shortness of breath          DISPOSITION / PLAN     DISPOSITION        PATIENT REFERRED TO:  No follow-up provider specified.     DISCHARGE MEDICATIONS:  New Prescriptions    No medications on file       Megan Larson MD  Emergency Medicine Resident    (Please note that portions of this note were completed with a voice recognition program.Efforts were made to edit the dictations but occasionally words are mis-transcribed.)        Megan Larson MD  Resident  12/23/21 0916

## 2021-12-23 NOTE — ED NOTES
Pt refused meds. Pt threw jello at RN. Pt verbally aggressive toward RN. Pt requesting to have phone numbers to TT and pharm to have O2 RX sent to. Pt refusing to have  in her care. Pt given meal tray.       Saintclair Lento, RN  12/23/21 [id number]

## 2021-12-23 NOTE — ED NOTES
Bed: 18  Expected date:   Expected time:   Means of arrival:   Comments:  [address], RN  12/23/21 1915

## 2021-12-23 NOTE — ED NOTES
Pt refusing to have xray. Pt refusing to have Bipap. Pt verbally aggressive with medical staff.       Jeffrey Ramos RN  12/23/21 5602

## 2021-12-23 NOTE — PROGRESS NOTES
PATIENT REFUSES TO WEAR BIPAP      [x] Risks and benefits explained to patient   [x] Patient refuses to wear Bipap stating I feel like i'm suffocating. [x] Patient verbalizes understanding of information presented      Patient is refusing everything. Cloteal Rivas

## 2021-12-23 NOTE — PROGRESS NOTES
I signed up for this patient in error. I did not see this patient. I did not participate in the evlauation or treatment of this patient.     Electronically signed by Doug Roberts DO on 12/23/2021 at 2:00 PM

## 2021-12-23 NOTE — ED NOTES
Pt presented to ED via LS for the complaint of SOB. Pt states hx of COPD and asthma. Pt was given 2G Mag, several breathing treatments and solumedrol. Pt denies chest pain. Pt was placed on bipap. Pt alert and oriented x 4.       Abhijit Myers RN  12/23/21 9546

## 2021-12-24 VITALS
WEIGHT: 293 LBS | TEMPERATURE: 98.3 F | OXYGEN SATURATION: 93 % | BODY MASS INDEX: 44.41 KG/M2 | RESPIRATION RATE: 20 BRPM | HEIGHT: 68 IN | DIASTOLIC BLOOD PRESSURE: 90 MMHG | SYSTOLIC BLOOD PRESSURE: 144 MMHG | HEART RATE: 111 BPM

## 2021-12-24 LAB
EKG ATRIAL RATE: 91 BPM
EKG P AXIS: 36 DEGREES
EKG P-R INTERVAL: 160 MS
EKG Q-T INTERVAL: 362 MS
EKG QRS DURATION: 84 MS
EKG QTC CALCULATION (BAZETT): 445 MS
EKG R AXIS: 39 DEGREES
EKG T AXIS: 46 DEGREES
EKG VENTRICULAR RATE: 91 BPM

## 2021-12-24 PROCEDURE — 94640 AIRWAY INHALATION TREATMENT: CPT

## 2021-12-24 PROCEDURE — 6360000002 HC RX W HCPCS: Performed by: STUDENT IN AN ORGANIZED HEALTH CARE EDUCATION/TRAINING PROGRAM

## 2021-12-24 PROCEDURE — 6370000000 HC RX 637 (ALT 250 FOR IP): Performed by: STUDENT IN AN ORGANIZED HEALTH CARE EDUCATION/TRAINING PROGRAM

## 2021-12-24 PROCEDURE — 96361 HYDRATE IV INFUSION ADD-ON: CPT

## 2021-12-24 PROCEDURE — 96366 THER/PROPH/DIAG IV INF ADDON: CPT

## 2021-12-24 PROCEDURE — 93005 ELECTROCARDIOGRAM TRACING: CPT | Performed by: STUDENT IN AN ORGANIZED HEALTH CARE EDUCATION/TRAINING PROGRAM

## 2021-12-24 PROCEDURE — 2700000000 HC OXYGEN THERAPY PER DAY

## 2021-12-24 PROCEDURE — 6370000000 HC RX 637 (ALT 250 FOR IP): Performed by: EMERGENCY MEDICINE

## 2021-12-24 PROCEDURE — G0378 HOSPITAL OBSERVATION PER HR: HCPCS

## 2021-12-24 PROCEDURE — 93010 ELECTROCARDIOGRAM REPORT: CPT | Performed by: INTERNAL MEDICINE

## 2021-12-24 RX ORDER — NAPROXEN 250 MG/1
250 TABLET ORAL 2 TIMES DAILY WITH MEALS
Status: DISCONTINUED | OUTPATIENT
Start: 2021-12-24 | End: 2021-12-24 | Stop reason: HOSPADM

## 2021-12-24 RX ORDER — ALBUTEROL SULFATE 90 UG/1
2 AEROSOL, METERED RESPIRATORY (INHALATION) EVERY 6 HOURS PRN
Status: DISCONTINUED | OUTPATIENT
Start: 2021-12-24 | End: 2021-12-24 | Stop reason: HOSPADM

## 2021-12-24 RX ORDER — LEVALBUTEROL TARTRATE 45 UG/1
1 AEROSOL, METERED ORAL EVERY 6 HOURS PRN
Status: DISCONTINUED | OUTPATIENT
Start: 2021-12-24 | End: 2021-12-24

## 2021-12-24 RX ORDER — ALPRAZOLAM 0.25 MG/1
0.25 TABLET ORAL EVERY 12 HOURS SCHEDULED
Status: DISCONTINUED | OUTPATIENT
Start: 2021-12-24 | End: 2021-12-24 | Stop reason: HOSPADM

## 2021-12-24 RX ORDER — NAPROXEN 250 MG/1
250 TABLET ORAL 2 TIMES DAILY WITH MEALS
Qty: 60 TABLET | Refills: 3 | Status: SHIPPED | OUTPATIENT
Start: 2021-12-24 | End: 2022-02-01

## 2021-12-24 RX ORDER — PREDNISONE 50 MG/1
50 TABLET ORAL DAILY
Qty: 5 TABLET | Refills: 0 | Status: SHIPPED | OUTPATIENT
Start: 2021-12-24 | End: 2021-12-29

## 2021-12-24 RX ORDER — ALPRAZOLAM 0.5 MG/1
0.5 TABLET, EXTENDED RELEASE ORAL DAILY
Status: DISCONTINUED | OUTPATIENT
Start: 2021-12-24 | End: 2021-12-24

## 2021-12-24 RX ADMIN — NAPROXEN 250 MG: 250 TABLET ORAL at 08:45

## 2021-12-24 RX ADMIN — BACLOFEN 10 MG: 10 TABLET ORAL at 00:01

## 2021-12-24 RX ADMIN — PREDNISONE 60 MG: 20 TABLET ORAL at 08:44

## 2021-12-24 RX ADMIN — GABAPENTIN 600 MG: 600 TABLET ORAL at 08:44

## 2021-12-24 RX ADMIN — CARIPRAZINE 1.5 MG: 1.5 CAPSULE, GELATIN COATED ORAL at 08:46

## 2021-12-24 RX ADMIN — ENOXAPARIN SODIUM 40 MG: 100 INJECTION SUBCUTANEOUS at 08:44

## 2021-12-24 RX ADMIN — ALBUTEROL SULFATE 1 PUFF: 90 AEROSOL, METERED RESPIRATORY (INHALATION) at 07:31

## 2021-12-24 RX ADMIN — ALPRAZOLAM 0.25 MG: 0.25 TABLET ORAL at 08:48

## 2021-12-24 RX ADMIN — BACLOFEN 20 MG: 10 TABLET ORAL at 08:45

## 2021-12-24 ASSESSMENT — PAIN DESCRIPTION - PAIN TYPE: TYPE: CHRONIC PAIN

## 2021-12-24 ASSESSMENT — PAIN SCALES - GENERAL
PAINLEVEL_OUTOF10: 9
PAINLEVEL_OUTOF10: 9

## 2021-12-24 ASSESSMENT — PAIN DESCRIPTION - LOCATION: LOCATION: HIP

## 2021-12-24 NOTE — PROGRESS NOTES
[address] [facility]  CDU / OBSERVATION ENCOUNTER  ATTENDING NOTE       I performed a history and physical examination of the patient and discussed management with the resident or midlevel provider. I reviewed the resident or midlevel provider's note and agree with the documented findings and plan of care. Any areas of disagreement are noted on the chart. I was personally present for the key portions of any procedures. I have documented in the chart those procedures where I was not present during the key portions. I have reviewed the nurses notes. I agree with the chief complaint, past medical history, past surgical history, allergies, medications, social and family history as documented unless otherwise noted below. The Family history, social history, and ROS are effectively unchanged since admission unless noted elsewhere in the chart. Patient admitted for exacerbation of COPD. Patient had initial difficulty with shortness of breath such that she required BiPAP. Patient did not tolerate BiPAP well because of anxiety. Patient's care has been compromised due to disruptive behavior. Patient has been uncooperative with her treatment refusing most of her suggested therapies. Patient has been difficult to obtain a history from. Patient is verbally abusive towards staff and has been refusing treatments and not complying with hospital policy regarding medication administration    Patient does have a history of bipolar disorder. Patient is functional and of decision-making capacity. I had a face-to-face discussion with the patient regarding oxygen therapy. Patient has had oxygen previously. We will see if she qualifies this time. Patient is absolutely adamant that she is leaving by 2:00 today. We will try to get an oxygen assessment and before then so that she has the ability to have home oxygen. Risks and benefits of home oxygen were discussed.   Patient has a full understanding of its use and what to monitor self for as she has been on it previously.     Eryn Nino MD  Attending Emergency  Physician
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
## 2021-12-24 NOTE — H&P
[address]  CDU / OBSERVATION eNCOUnter  Resident Note     Pt Name: Mariano Willingham  MRN: [id number]  [place] 1970  Date of evaluation: 12/24/21  Patient's PCP is :  LAWRENCE Fonseca CNP    CHIEF COMPLAINT       Chief Complaint   Patient presents with    Shortness of Breath         HISTORY OF PRESENT ILLNESS    Mariano Willingham is a 46 y.o. female who presents with shortness of breath has been ongoing for the past few days. Patient reports that she would like to be discharged, would like telephone #[phone] to set up her home home oxygen. Patient was brought here by EMS for being hypoxic in the low 70s. She was placed on BiPAP and has a history of COPD. In the emergency department, patient was refusing several different treatments. On interview this morning patient reports that this time of year is very difficult for her as her father passed away this time last year. She would like to have steroids at home. Location/Symptom: Shortness of breath  Timing/Onset: Several days  Provocation: Unknown  Quality: N/a  Radiation: N/a  Severity: n/a  Timing/Duration: Constant progressively worsening  Modifying Factors: Steroid/BiPAP    REVIEW OF SYSTEMS     Bold positive  General ROS - No fevers, No malaise   Ophthalmic ROS - No discharge, No changes in vision  ENT ROS -  No sore throat, No rhinorrhea,   Respiratory ROS - no shortness of breath, no cough, no  wheezing  Cardiovascular ROS - No chest pain, no dyspnea on exertion  Gastrointestinal ROS - No abdominal pain, no nausea or vomiting, no change in bowel habits, no black or bloody stools  Genito-Urinary ROS - No dysuria, trouble voiding, or hematuria  Musculoskeletal ROS - No myalgias, No arthalgias  Neurological ROS - No headache, no dizziness/lightheadedness, No focal weakness, no loss of sensation  Dermatological ROS - No lesions, No rash     (PQRS) Advance directives on face sheet per hospital policy.  No change unless specifically mentioned in chart    PAST MEDICAL HISTORY    has a past medical history of Acute exacerbation of COPD with asthma ([facility] [address] Anxiety, Asthma, Benign essential HTN, Bipolar 1 disorder ([facility] [address] COPD (chronic obstructive pulmonary disease) ([facility] [address] Depression, Fibromyalgia, Headache(784.0), Hip arthritis, Hyperlipidemia, Obesity, Pneumonia due to infectious organism, and Post traumatic stress disorder. I have reviewed the past medical history with the patient and it is pertinent to this complaint. SURGICAL HISTORY      has a past surgical history that includes Tonsillectomy; Cholecystectomy; hc inject other perphrl nerv (Bilateral, 12/5/2019); Nerve Block (Bilateral, 01/09/2020); and Anesthesia Nerve Block (N/A, 1/9/2020). I have reviewed and agree with Surgical History entered and it is pertinent to this complaint.      CURRENT MEDICATIONS     naproxen (NAPROSYN) tablet 250 mg, BID WC  cariprazine hcl (VRAYLAR) capsule 1.5 mg, Daily  ALPRAZolam (XANAX) tablet 0.25 mg, 2 times per day  albuterol sulfate  (90 Base) MCG/ACT inhaler 2 puff, Q6H PRN  predniSONE (DELTASONE) tablet 60 mg, Once  albuterol (PROVENTIL) nebulizer solution 2.5 mg, Q4H PRN  albuterol sulfate  (90 Base) MCG/ACT inhaler 1 puff, 4x daily  ALPRAZolam (XANAX) tablet 0.5 mg, Nightly PRN  carBAMazepine (TEGRETOL) tablet 400 mg, Nightly  gabapentin (NEURONTIN) tablet 600 mg, TID  0.9 % sodium chloride infusion, Continuous  sodium chloride flush 0.9 % injection 5-40 mL, 2 times per day  sodium chloride flush 0.9 % injection 5-40 mL, PRN  0.9 % sodium chloride infusion, PRN  potassium chloride (KLOR-CON M) extended release tablet 40 mEq, PRN   Or  potassium bicarb-citric acid (EFFER-K) effervescent tablet 40 mEq, PRN   Or  potassium chloride 10 mEq/100 mL IVPB (Peripheral Line), PRN  enoxaparin (LOVENOX) injection 40 mg, Daily  ondansetron (ZOFRAN) injection 4 mg, Q4H PRN  polyethylene glycol (GLYCOLAX) packet 17 g, Daily PRN  acetaminophen (TYLENOL) tablet 650 mg, Q6H PRN   Or  acetaminophen (TYLENOL) suppository 650 mg, Q6H PRN  levoFLOXacin (LEVAQUIN) 750 MG/150ML infusion 750 mg, Q24H  predniSONE (DELTASONE) tablet 60 mg, Daily  budesonide-formoterol (SYMBICORT) 80-4.5 MCG/ACT inhaler 2 puff, BID  venlafaxine (EFFEXOR XR) extended release capsule 150 mg, Daily  baclofen (LIORESAL) tablet 20 mg, TID        All medication charted and reviewed. ALLERGIES     is allergic to food, penicillins, parafon forte dsc [chlorzoxazone], and biaxin [clarithromycin]. FAMILY HISTORY     She indicated that her mother is alive. She indicated that her father is alive. She indicated that her brother is alive. She indicated that the status of her other is unknown.     family history includes Diabetes in an other family member; Heart Attack in her mother; Heart Disease in her brother, father, and mother; Hypertension in her father and mother. The patient denies any pertinent family history. I have reviewed and agree with the family history entered. I have reviewed the Family History and it is not significant to the case    SOCIAL HISTORY      reports that she quit smoking about 11 years ago. Her smoking use included cigarettes. She has a 40.00 pack-year smoking history. She has never used smokeless tobacco. She reports current alcohol use. She reports that she does not use drugs. I have reviewed and agree with all Social.  There are no concerns for substance abuse/use. PHYSICAL EXAM     INITIAL VITALS:  height is 5' 8\" (1.727 m) and weight is 330 lb (149.7 kg) (abnormal). Her oral temperature is 98.3 °F (36.8 °C). Her blood pressure is 144/90 (abnormal) and her pulse is 111. Her respiration is 20 and oxygen saturation is 93%.       CONSTITUTIONAL: AOx4, no apparent distress, appears stated age    HEAD: normocephalic, atraumatic   EYES:  Anicteric sclera   ENT: moist mucous membranes   NECK: supple, symmetric   BACK: symmetric COMPARISON: CT PE study dated 6 August 2018. HISTORY: ORDERING SYSTEM PROVIDED HISTORY: concern for PE TECHNOLOGIST PROVIDED HISTORY: concern for PE Decision Support Exception - unselect if not a suspected or confirmed emergency medical condition->Emergency Medical Condition (MA) FINDINGS: Pulmonary Arteries: Pulmonary arteries are not adequately opacified for evaluation. Although there is no evidence of a pulmonary embolism in the proximal left or right main pulmonary arteries or within the main pulmonary artery, more distal pulmonary vessels are not adequately opacified. Mediastinum: No evidence of mediastinal lymphadenopathy. The heart and pericardium demonstrate no acute abnormality. There is no acute abnormality of the thoracic aorta. Lungs/pleura: There is a 8 mm pleural based pulmonary nodule in the right upper lobe posteriorly which is unchanged from the comparison exam.  7 mm pulmonary nodule in the right lower lobe is also stable. No acute airspace infiltrates. No pneumothorax or pleural effusion. The central airways are clear. Upper Abdomen: Limited images of the upper abdomen are unremarkable. Soft Tissues/Bones: No acute bone or soft tissue abnormality. 1.  The pulmonary arteries are not adequately opacified for evaluation of segmental pulmonary emboli. However, no emboli are seen within the main pulmonary artery or within the proximal left and right main pulmonary vessels. Smaller pulmonary emboli could be missed. 2.  No acute airspace infiltrate, pneumothorax, or pleural effusion. 3.  Stable pulmonary nodules as described above. RECOMMENDATIONS: Unavailable       LABS:  I have reviewed and interpreted all available lab results.   Labs Reviewed   CBC WITH AUTO DIFFERENTIAL - Abnormal; Notable for the following components:       Result Value    Seg Neutrophils 29 (*)     Lymphocytes 46 (*)     Eosinophils % 15 (*)     Immature Granulocytes 1 (*)     Absolute Lymph # 3.97 (*)     Absolute Eos # 1.25 (*)     All other components within normal limits   BLOOD GAS, VENOUS - Abnormal; Notable for the following components:    pH, [place] 7.292 (*)     pCO2, [place] 57.3 (*)     All other components within normal limits   BASIC METABOLIC PANEL - Abnormal; Notable for the following components:    Glucose 146 (*)     All other components within normal limits   COVID-19, RAPID   TROPONIN   BRAIN NATRIURETIC PEPTIDE   D-DIMER, QUANTITATIVE   BASIC METABOLIC PANEL           CDU IMPRESSION / PLAN      Alvina Wills is a 46 y.o. female who presents with     · Shortness of breath likely secondary to COPD exacerbation  · Patient refusing BiPAP, would like discharge home  · Will prescribe short course of steroids  · Home O2 eval  · Continue home medications and pain control  · Monitor vitals, labs, and imaging  · DISPO: pending consults and clinical improvement    CONSULTS:    IP CONSULT TO CASE MANAGEMENT    PROCEDURES:  Not indicated       PATIENT REFERRED TO:    LAWRENCE Lau - CNP  [address]  [phone]            --  Susan Land DO   Emergency Medicine Resident     This dictation was generated by voice recognition computer software. Although all attempts are made to edit the dictation for accuracy, there may be errors in the transcription that are not intended.

## 2021-12-24 NOTE — ED NOTES
.  ED to inpatient nurses report    Chief Complaint   Patient presents with    Shortness of Breath      Present to ED from Home  LOC: alert and orientated to name, place, date  Vital signs   Vitals:    12/23/21 1400 12/23/21 1401 12/23/21 1410 12/23/21 1435   BP:  (!) 121/92     Pulse:  126     Resp: 24 (!) 33 21 26   Temp:  98.3 °F (36.8 °C)     TempSrc:  Oral     SpO2:  (!) 88% 99%    Weight:  (!) 330 lb (149.7 kg)     Height:  5' 8\" (1.727 m)        Oxygen Baseline 2L    Current needs required 2L   LDAs:   Peripheral IV 12/23/21 Right Antecubital (Active)   Site Assessment Clean;Dry; Intact 12/23/21 1404   Line Status Blood return noted;Specimen collected 12/23/21 1404   Dressing Status Clean;Dry; Intact 12/23/21 1404   Dressing Intervention New 12/23/21 1404     Mobility: Independent  Pending ED orders: None  Present condition: Stable  Code Status: [unfilled]   Consults:  []  Hospitalist  Completed  [] yes [] no  []  Medicine  Completed  [] yes [] No  []  Cardiology  Completed  [] yes [] No  []  GI   Completed  [] yes [] No  []  Neurology  Completed  [] yes [] No  []  Nephrology Completed  [] yes [] No  []  Vascular  Completed  [] yes [] No   []  Surgery  Completed  [] yes [] No   []  Urology  Completed  [] yes [] No   []  Plastics  Completed  [] yes [] No   []  ENT  Completed  [] yes [] No   []  Other   Completed  [] yes [] No  Pertinent event(s) None  Pertinent event(s) None  Electronically signed by Saeid Begum RN on 12/23/2021 at 7:53 PM     Saeid Begum, [address] [place] [address]  12/23/21 1885

## 2021-12-24 NOTE — CARE COORDINATION
Case Management Initial Discharge Plan  [address],             Met with:patient to discuss discharge plans. Information verified: address, contacts, phone number, , insurance Yes  Insurance Provider:   Vasquez Mcdonald and medicaid    Emergency Contact/Next of Sarina [address] name & number: friend Javon Orellana  Number on face sheet   Who are involved in patient's support system? friend    PCP: Tirso Wilkins, LAWRENCE - CNP  Date of last visit: last month      Discharge Planning    Living Arrangements:    lives with a room mate     Home has 2 stories  2 stairs to climb to get into front door, 0stairs to climb to reach second floor  Location of bedroom/bathroom in home main    Patient able to perform ADL's:Assisted    Current Services (outpatient & in home) no  DME equipment: walker  DME provider: pharmacy counter    Is patient receiving oral anticoagulation therapy? No    If indicated:   Physician managing anticoagulation treatment:   Where does patient obtain lab work for ATC treatment? Potential Assistance Needed:       Patient agreeable to home care: No  [place] of choice provided:  no    Prior SNF/Rehab Placement and Facility: no  Agreeable to SNF/Rehab: No  [place] of choice provided: no     Evaluation: no    Expected Discharge date:       Patient expects to be discharged to: If home: is the family and/or caregiver wiling & able to provide support at home? yes  Who will be providing this support? Sara*    Follow Up Appointment: Best Day/ Time:      Transportation provider: pt states she needs a cab   Transportation arrangements needed for discharge: Yes    Readmission Risk              Risk of Unplanned Readmission:  0             Does patient have a readmission risk score greater than 14?: No  If yes, follow-up appointment must be made within 7 days of discharge.      Goals of Care: assist with safe discharge      Educated pt on transitional options,will provide freedom of choice and are agreeable with plan      Discharge Plan: home with roommate has PCP needs ride wants meds filled here follow for needs           Electronically signed by Damien Herrera RN on 12/24/21 at 11:00 AM EST
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
## 2021-12-24 NOTE — DISCHARGE SUMMARY
CDU Discharge Summary        Patient:  Bipin Ivory  YOB: 1970    MRN: [id number]   Acct: [de-identified]    Primary Care Physician: LAWRENCE Connell CNP    Admit date:  12/23/2021  1:54 PM  Discharge date: 12/24/2021  1:39 PM     Discharge Diagnoses:     Acute shortness of breath due to COPD exacerbation  Improved with oxygen therapy    Follow-up:  Call today/tomorrow for a follow up appointment with LAWRENCE Connell CNP , or return to the Emergency Room with worsening symptoms    Stressed to patient the importance of following up with primary care doctor for further workup/management of symptoms. Pt verbalizes understanding and agrees with plan. Discharge Medications:  Changes to medications       [unfilled]    Diet:  No diet orders on file , Advance as tolerated     Activity:  As tolerated    Consultants: IP CONSULT TO CASE MANAGEMENT    Procedures:  Not indicated     Diagnostic Test:   Results for orders placed or performed during the hospital encounter of 12/23/21   COVID-19, Rapid    Specimen: Nasopharyngeal Swab   Result Value Ref Range    Specimen Description . NASOPHARYNGEAL SWAB     SARS-CoV-2, Rapid Not Detected Not Detected   Troponin   Result Value Ref Range    Troponin, High Sensitivity <6 0 - 14 ng/L    Troponin T NOT REPORTED <0.03 ng/mL    Troponin Interp NOT REPORTED    Brain Natriuretic Peptide   Result Value Ref Range    Pro-BNP <20 <300 pg/mL    BNP Interpretation NOT REPORTED    D-DIMER, QUANTITATIVE   Result Value Ref Range    D-Dimer, Quant 1.49 mg/L FEU   CBC WITH AUTO DIFFERENTIAL   Result Value Ref Range    WBC 8.5 3.5 - 11.3 k/uL    RBC 4.47 3.95 - 5.11 m/uL    Hemoglobin 14.7 11.9 - 15.1 g/dL    Hematocrit 44.7 36.3 - 47.1 %    .0 82.6 - 102.9 fL    MCH 32.9 25.2 - 33.5 pg    MCHC 32.9 28.4 - 34.8 g/dL    RDW 13.3 11.8 - 14.4 %    Platelets 357 692 - 464 k/uL    MPV 10.4 8.1 - 13.5 fL    NRBC Automated 0.0 0.0 per 100 WBC    Differential Type NOT REPORTED     Seg Neutrophils 29 (L) 36 - 65 %    Lymphocytes 46 (H) 24 - 43 %    Monocytes 8 3 - 12 %    Eosinophils % 15 (H) 1 - 4 %    Basophils 1 0 - 2 %    Immature Granulocytes 1 (H) 0 %    Segs Absolute 2.48 1.50 - 8.10 k/uL    Absolute Lymph # 3.97 (H) 1.10 - 3.70 k/uL    Absolute Mono # 0.65 0.10 - 1.20 k/uL    Absolute Eos # 1.25 (H) 0.00 - 0.44 k/uL    Basophils Absolute 0.08 0.00 - 0.20 k/uL    Absolute Immature Granulocyte 0.04 0.00 - 0.30 k/uL    WBC Morphology NOT REPORTED     RBC Morphology NOT REPORTED     Platelet Estimate NOT REPORTED    BLOOD GAS, VENOUS   Result Value Ref Range    pH, [place] 7.292 (L) 7.320 - 7.420    pCO2, [place] 57.3 (H) 39 - 55    pO2, [place] 43.4 30 - 50    HCO3, Venous 26.8 24 - 30 mmol/L    Positive Base Excess, [place] NOT REPORTED 0.0 - 2.0 mmol/L    Negative Base Excess, [place] 0.6 0.0 - 2.0 mmol/L    O2 Sat, [place] 74.0 60.0 - 85.0 %    Total Hb NOT REPORTED 12.0 - 16.0 g/dl    Oxyhemoglobin NOT REPORTED 95.0 - 98.0 %    Carboxyhemoglobin 3.3 0 - 5 %    Methemoglobin NOT REPORTED 0.0 - 1.5 %    Pt Temp 37.0     pH, [place], Temp Adj NOT REPORTED 7.320 - 7.420    pCO2, [place], Temp Adj NOT REPORTED 39 - 55 mmHg    pO2, [place], Temp Adj NOT REPORTED 30 - 50 mmHg    O2 Device/Flow/% NOT REPORTED     Respiratory Rate NOT REPORTED     Brian Test NOT REPORTED     Sample Site NOT REPORTED     Pt.  Position NOT REPORTED     Mode NOT REPORTED     Set Rate NOT REPORTED     Total Rate NOT REPORTED     VT NOT REPORTED     FIO2 INFORMATION NOT PROVIDED     Peep/Cpap NOT REPORTED     PSV NOT REPORTED     Text for Respiratory NOT REPORTED     NOTIFICATION NOT REPORTED     NOTIFICATION TIME NOT REPORTED    Basic Metabolic Panel   Result Value Ref Range    Glucose 146 (H) 70 - 99 mg/dL    BUN 15 6 - 20 mg/dL    CREATININE 0.70 0.50 - 0.90 mg/dL    Bun/Cre Ratio NOT REPORTED 9 - 20    Calcium 9.8 8.6 - 10.4 mg/dL    Sodium 140 135 - 144 mmol/L    Potassium 4.2 3.7 - 5.3 mmol/L    Chloride 103 98 - 107 mmol/L    CO2 21 20 - 31 mmol/L    Anion Gap 16 9 - 17 mmol/L    GFR Non-African American >60 >60 mL/min    GFR African American >60 >60 mL/min    GFR Comment          GFR Staging NOT REPORTED    EKG 12 lead   Result Value Ref Range    Ventricular Rate 91 BPM    Atrial Rate 91 BPM    P-R Interval 160 ms    QRS Duration 84 ms    Q-T Interval 362 ms    QTc Calculation (Bazett) 445 ms    P Axis 36 degrees    R Axis 39 degrees    T Axis 46 degrees     XR CHEST PORTABLE    Result Date: 12/23/2021  EXAMINATION: ONE XRAY VIEW OF THE CHEST 12/23/2021 11:10 am COMPARISON: 09/25/2019 HISTORY: ORDERING SYSTEM PROVIDED HISTORY: concern for copd exacerbation TECHNOLOGIST PROVIDED HISTORY: concern for copd exacerbation FINDINGS: Examination is limited by the patient's body habitus and by portable technique. Pulmonary vasculature is congested. Definite focal infiltrate is not identified, but the retrocardiac region is not well evaluated. No pneumothorax is seen. No free air. No acute bony abnormality. Pulmonary vascular congestion. No definite acute infiltrate, but examination is limited. CT CHEST PULMONARY EMBOLISM W CONTRAST    Result Date: 12/23/2021  EXAMINATION: CTA OF THE CHEST 12/23/2021 5:21 pm TECHNIQUE: CTA of the chest was performed after the administration of intravenous contrast.  Multiplanar reformatted images are provided for review. MIP images are provided for review. Dose modulation, iterative reconstruction, and/or weight based adjustment of the mA/kV was utilized to reduce the radiation dose to as low as reasonably achievable. COMPARISON: CT PE study dated 6 August 2018. HISTORY: ORDERING SYSTEM PROVIDED HISTORY: concern for PE TECHNOLOGIST PROVIDED HISTORY: concern for PE Decision Support Exception - unselect if not a suspected or confirmed emergency medical condition->Emergency Medical Condition (MA) FINDINGS: Pulmonary Arteries: Pulmonary arteries are not adequately opacified for evaluation.   Although there is no evidence of a pulmonary embolism in the proximal left or right main pulmonary arteries or within the main pulmonary artery, more distal pulmonary vessels are not adequately opacified. Mediastinum: No evidence of mediastinal lymphadenopathy. The heart and pericardium demonstrate no acute abnormality. There is no acute abnormality of the thoracic aorta. Lungs/pleura: There is a 8 mm pleural based pulmonary nodule in the right upper lobe posteriorly which is unchanged from the comparison exam.  7 mm pulmonary nodule in the right lower lobe is also stable. No acute airspace infiltrates. No pneumothorax or pleural effusion. The central airways are clear. Upper Abdomen: Limited images of the upper abdomen are unremarkable. Soft Tissues/Bones: No acute bone or soft tissue abnormality. 1.  The pulmonary arteries are not adequately opacified for evaluation of segmental pulmonary emboli. However, no emboli are seen within the main pulmonary artery or within the proximal left and right main pulmonary vessels. Smaller pulmonary emboli could be missed. 2.  No acute airspace infiltrate, pneumothorax, or pleural effusion. 3.  Stable pulmonary nodules as described above. RECOMMENDATIONS: Unavailable           Physical Exam:    General appearance - NAD, AOx 3   Lungs -CTAB, no R/R/R  Heart - RRR, no M/R/G  Abdomen - Soft, NT/ND  Neurological:  MAEx4, No focal motor deficit, sensory loss  Extremities - Cap refil <2 sec in all ext., no edema  Skin -warm, dry      Hospital Course:  Clinical course has improved, labs and imaging reviewed. Nicole Shipley originally presented to the hospital on 12/23/2021  1:54 PM. with shortness of breath. At that time it was determined that She required further observation and oxygen therapy. She was admitted and labs and imaging were followed daily. Imaging results as above. She is medically stable to be discharged.        Disposition: Home    Patient stated that they will not drive themselves home from the hospital if they have gotten pain killers/ narcotics earlier that day and that they will arrange for transportation on their own or work with the  for a ride. Patient counseled NOT to drive while under the influence of narcotics/ pain killers. Condition: Good    Patient stable and ready for discharge home. I have discussed plan of care with patient and they are in understanding. They were instructed to read discharge paperwork. All of their questions and concerns were addressed. Time Spent: 0 day      --  Chun Yancey DO  Emergency Medicine Resident Physician    This dictation was generated by voice recognition computer software. Although all attempts are made to edit the dictation for accuracy, there may be errors in the transcription that are not intended.

## 2021-12-27 LAB
EKG ATRIAL RATE: 130 BPM
EKG P AXIS: 73 DEGREES
EKG P-R INTERVAL: 160 MS
EKG Q-T INTERVAL: 292 MS
EKG QRS DURATION: 78 MS
EKG QTC CALCULATION (BAZETT): 429 MS
EKG R AXIS: 57 DEGREES
EKG T AXIS: 66 DEGREES
EKG VENTRICULAR RATE: 130 BPM

## 2021-12-27 RX ORDER — ALBUTEROL SULFATE 2.5 MG/3ML
2.5 SOLUTION RESPIRATORY (INHALATION) EVERY 4 HOURS PRN
Qty: 120 EACH | Refills: 3 | Status: SHIPPED | OUTPATIENT
Start: 2021-12-27

## 2021-12-27 NOTE — TELEPHONE ENCOUNTER
Next Visit Date:  2/1/2022    Hemoglobin A1C (%)   Date Value   08/06/2018 5.2   10/24/2014 5.4   05/03/2013 5.7             ( goal A1C is < 7)   No results found for: LABMICR  LDL Cholesterol (mg/dL)   Date Value   05/02/2013 111 (H)     LDL Calculated (mg/dL)   Date Value   04/11/2019 61       (goal LDL is <100)   AST (U/L)   Date Value   06/29/2021 44 (H)     ALT (U/L)   Date Value   06/29/2021 30     BUN (mg/dL)   Date Value   12/23/2021 15     BP Readings from Last 3 Encounters:   12/24/21 (!) 144/90   06/29/21 (!) 150/88   04/26/21 (!) 162/100          (goal 120/80)        Patient Active Problem List:     Chronic obstructive pulmonary disease, unspecified (HCC)     Acute exacerbation of chronic obstructive pulmonary disease (COPD) ([address]     Multiple nodules of lung     Morbid obesity with body mass index (BMI) of 50.0 to 59.9 in adult (HCC)     Leukocytosis     Sleep apnea     Bipolar 1 disorder (HCC)     Anxiety     COPD exacerbation (HCC)     Benign essential HTN     Major depression     Abnormal TSH     Chronic low back pain     Fibromyalgia     Hip pain     Hip pain, bilateral     Chronic pain of both knees     Hyperlipidemia     Family history of diseases of the ms sys and connective tiss     Lumbar stenosis with neurogenic claudication     Migraines     Personal history of nicotine dependence     Primary osteoarthritis of both hips     Primary osteoarthritis of both knees     Seasonal allergies     COPD (chronic obstructive pulmonary disease) ([address]      ----Lind Lesches

## 2022-02-01 ENCOUNTER — OFFICE VISIT (OUTPATIENT)
Dept: PRIMARY CARE CLINIC | Age: 52
End: 2022-02-01
Payer: MEDICARE

## 2022-02-01 VITALS
DIASTOLIC BLOOD PRESSURE: 67 MMHG | WEIGHT: 293 LBS | BODY MASS INDEX: 61.43 KG/M2 | HEART RATE: 116 BPM | OXYGEN SATURATION: 96 % | TEMPERATURE: 97.9 F | SYSTOLIC BLOOD PRESSURE: 139 MMHG

## 2022-02-01 DIAGNOSIS — Z12.11 SCREENING FOR COLON CANCER: ICD-10-CM

## 2022-02-01 DIAGNOSIS — Z12.31 ENCOUNTER FOR SCREENING MAMMOGRAM FOR BREAST CANCER: ICD-10-CM

## 2022-02-01 DIAGNOSIS — Z80.0 FAMILY HISTORY OF MALIGNANT NEOPLASM OF COLON IN RELATIVE DIAGNOSED WHEN OLDER THAN 50 YEARS OF AGE: ICD-10-CM

## 2022-02-01 DIAGNOSIS — Z01.818 PREOPERATIVE EXAMINATION: Primary | ICD-10-CM

## 2022-02-01 DIAGNOSIS — Z23 NEED FOR VACCINATION: ICD-10-CM

## 2022-02-01 DIAGNOSIS — J44.9 CHRONIC OBSTRUCTIVE PULMONARY DISEASE, UNSPECIFIED COPD TYPE (HCC): ICD-10-CM

## 2022-02-01 PROCEDURE — G8427 DOCREV CUR MEDS BY ELIG CLIN: HCPCS | Performed by: NURSE PRACTITIONER

## 2022-02-01 PROCEDURE — G8417 CALC BMI ABV UP PARAM F/U: HCPCS | Performed by: NURSE PRACTITIONER

## 2022-02-01 PROCEDURE — 99214 OFFICE O/P EST MOD 30 MIN: CPT | Performed by: NURSE PRACTITIONER

## 2022-02-01 PROCEDURE — 1036F TOBACCO NON-USER: CPT | Performed by: NURSE PRACTITIONER

## 2022-02-01 PROCEDURE — 90674 CCIIV4 VAC NO PRSV 0.5 ML IM: CPT | Performed by: NURSE PRACTITIONER

## 2022-02-01 PROCEDURE — G0008 ADMIN INFLUENZA VIRUS VAC: HCPCS | Performed by: NURSE PRACTITIONER

## 2022-02-01 PROCEDURE — 3017F COLORECTAL CA SCREEN DOC REV: CPT | Performed by: NURSE PRACTITIONER

## 2022-02-01 PROCEDURE — 3023F SPIROM DOC REV: CPT | Performed by: NURSE PRACTITIONER

## 2022-02-01 PROCEDURE — G8482 FLU IMMUNIZE ORDER/ADMIN: HCPCS | Performed by: NURSE PRACTITIONER

## 2022-02-01 RX ORDER — CARIPRAZINE 3 MG/1
CAPSULE, GELATIN COATED ORAL
COMMUNITY
Start: 2021-11-30

## 2022-02-01 RX ORDER — ALBUTEROL SULFATE 90 UG/1
AEROSOL, METERED RESPIRATORY (INHALATION)
Qty: 18 G | Refills: 2 | Status: SHIPPED | OUTPATIENT
Start: 2022-02-01 | End: 2022-05-12 | Stop reason: SDUPTHER

## 2022-02-01 ASSESSMENT — PATIENT HEALTH QUESTIONNAIRE - PHQ9
9. THOUGHTS THAT YOU WOULD BE BETTER OFF DEAD, OR OF HURTING YOURSELF: 0
1. LITTLE INTEREST OR PLEASURE IN DOING THINGS: 1
5. POOR APPETITE OR OVEREATING: 1
6. FEELING BAD ABOUT YOURSELF - OR THAT YOU ARE A FAILURE OR HAVE LET YOURSELF OR YOUR FAMILY DOWN: 0
SUM OF ALL RESPONSES TO PHQ QUESTIONS 1-9: 5
SUM OF ALL RESPONSES TO PHQ QUESTIONS 1-9: 5
10. IF YOU CHECKED OFF ANY PROBLEMS, HOW DIFFICULT HAVE THESE PROBLEMS MADE IT FOR YOU TO DO YOUR WORK, TAKE CARE OF THINGS AT HOME, OR GET ALONG WITH OTHER PEOPLE: 0
2. FEELING DOWN, DEPRESSED OR HOPELESS: 1
4. FEELING TIRED OR HAVING LITTLE ENERGY: 1
SUM OF ALL RESPONSES TO PHQ9 QUESTIONS 1 & 2: 2
3. TROUBLE FALLING OR STAYING ASLEEP: 1
SUM OF ALL RESPONSES TO PHQ QUESTIONS 1-9: 5
7. TROUBLE CONCENTRATING ON THINGS, SUCH AS READING THE NEWSPAPER OR WATCHING TELEVISION: 0
8. MOVING OR SPEAKING SO SLOWLY THAT OTHER PEOPLE COULD HAVE NOTICED. OR THE OPPOSITE, BEING SO FIGETY OR RESTLESS THAT YOU HAVE BEEN MOVING AROUND A LOT MORE THAN USUAL: 0
SUM OF ALL RESPONSES TO PHQ QUESTIONS 1-9: 5

## 2022-02-01 ASSESSMENT — ENCOUNTER SYMPTOMS
SINUS PAIN: 0
BLOOD IN STOOL: 0
SHORTNESS OF BREATH: 1
WHEEZING: 0
DIARRHEA: 0
BACK PAIN: 1
VOMITING: 0
CHEST TIGHTNESS: 0
TROUBLE SWALLOWING: 0
ABDOMINAL PAIN: 0
COUGH: 0
NAUSEA: 0

## 2022-02-01 NOTE — PROGRESS NOTES
ASSESSMENT     Diagnosis Orders   1. Preoperative examination     2. Encounter for screening mammogram for breast cancer  INA Digital Screen Bilateral [[id number]]   3. Screening for colon cancer  Sanam Hammer MD, Gastroenterology, Yasemin Toledo MD, Gastroenterology, [facility]   4. Chronic obstructive pulmonary disease, unspecified COPD type (HCC)  albuterol sulfate  (90 Base) MCG/ACT inhaler   5. Family history of malignant neoplasm of colon in relative diagnosed when older than 48years of age  Sanam Hammer MD, Gastroenterology, Yasemin Toledo MD, Gastroenterology, [facility]   6. Need for vaccination  INFLUENZA, MDCK QUADV, 2 YRS AND OLDER, IM, PF, PREFILL SYR OR SDV, 0.5ML (FLUCELVAX QUADV, PF)          PLAN:  Orders Placed This Encounter   Medications    albuterol sulfate  (90 Base) MCG/ACT inhaler     Sig: inhale 1 puff by mouth and INTO THE LUNGS every 4 hours if needed for wheezing     Dispense:  18 g     Refill:  2         Return in about 4 months (around 6/1/2022) for copd. An electronic signature was used to authenticate this note. --LAWRENCE Diaz - CNP on 2/1/2022 at 2:44 PM  Visit Information    Have you changed or started any medications since your last visit including any over-the-counter medicines, vitamins, or herbal medicines? no   Are you having any side effects from any of your medications? -  no  Have you stopped taking any of your medications? Is so, why? -  no    Have you seen any other physician or provider since your last visit? Yes - Records Obtained  Have you had any other diagnostic tests since your last visit? Yes - Records Obtained  Have you been seen in the emergency room and/or had an admission to a hospital since we last saw you? Yes - Records Obtained  Have you had your routine dental cleaning in the past 6 months? no    Have you activated your [employer] account? If not, what are your barriers?  Yes Patient Care Team:  LAWRENCE Hlal CNP as PCP - General (Family Medicine)  LAWRENCE Hall CNP as PCP - [facility] Empaneled Provider  Janie Cantu MD as Consulting Physician (Gastroenterology)    Medical History Review  Past Medical, Family, and Social History reviewed and does not contribute to the patient presenting condition    Health Maintenance   Topic Date Due    Hepatitis C screen  Never done    Depression Monitoring  Never done    HIV screen  Never done    Cervical cancer screen  Never done    Colon cancer screen colonoscopy  Never done    Breast cancer screen  Never done    Shingles Vaccine (1 of 2) Never done    Low dose CT lung screening  09/25/2020    Diabetes screen  08/06/2021    COVID-19 Vaccine (3 - Booster for Wilkinson Peter series) 10/20/2021    Annual Wellness Visit (AWV)  Never done    Potassium monitoring  12/23/2022    Creatinine monitoring  12/23/2022    Lipid screen  04/11/2024    DTaP/Tdap/Td vaccine (2 - Td or Tdap) 08/18/2027    Pneumococcal 0-64 years Vaccine (2 of 2 - PPSV23) 09/25/2035    Flu vaccine  Completed    Hepatitis A vaccine  Aged Out    Hepatitis B vaccine  Aged Out    Hib vaccine  Aged Out    Meningococcal (ACWY) vaccine  Aged Out

## 2022-02-01 NOTE — PROGRESS NOTES
Subjective:      Gema Collier is a 46 y.o. female  presents to the office today for a preoperative consultation at the request of surgeon Ham Sarmiento who plans on performing left total hip on 4/20/22 once PAT completed. This consultation is requested for the specific conditions prompting preoperative evaluation (i.e. because of potential affect on operative risk): obesity, Asthma, COPD. Planned anesthesia is General.  The patient has the following known anesthesia issues: no previous problems  Patient has a bleeding risk of : no recent abnormal bleeding, no remote history of abnormal bleeding, no use of Ca-channel blockers  Patient does not have objection to receiving blood products if needed. Just had EKG and CXR for COPD exacerbation. Symptoms currently resolved, back to baseline. Review of Systems   Constitutional: Negative for chills, diaphoresis and fever. HENT: Negative for congestion, ear pain, sinus pain, sneezing and trouble swallowing. Respiratory: Positive for shortness of breath. Negative for cough, chest tightness and wheezing. Cardiovascular: Negative for chest pain and palpitations. Gastrointestinal: Negative for abdominal pain, blood in stool, diarrhea, nausea and vomiting. Genitourinary: Negative for difficulty urinating, dysuria, frequency and hematuria. Musculoskeletal: Positive for arthralgias and back pain. Skin: Negative for rash and wound. Allergic/Immunologic: Negative for immunocompromised state. Neurological: Negative for dizziness, seizures and weakness. Hematological: Does not bruise/bleed easily. Objective:     Physical Exam  Vitals reviewed. Constitutional:       General: She is not in acute distress. Appearance: She is well-developed. She is morbidly obese. She is not ill-appearing. HENT:      Head: Normocephalic. Right Ear: Tympanic membrane and external ear normal. Tympanic membrane is not erythematous or bulging.       Left Ear: Tympanic membrane and external ear normal. Tympanic membrane is not erythematous or bulging. Nose:      Right Sinus: No maxillary sinus tenderness or frontal sinus tenderness. Left Sinus: No maxillary sinus tenderness or frontal sinus tenderness. Mouth/Throat:      Mouth: Mucous membranes are not dry. Pharynx: Uvula midline. No oropharyngeal exudate, posterior oropharyngeal erythema or uvula swelling. Tonsils: No tonsillar exudate. Eyes:      General: No scleral icterus. Right eye: No discharge. Left eye: No discharge. Conjunctiva/sclera: Conjunctivae normal.      Pupils: Pupils are equal, round, and reactive to light. Cardiovascular:      Rate and Rhythm: Normal rate and regular rhythm. Heart sounds: Normal heart sounds. Pulmonary:      Effort: Pulmonary effort is normal. No respiratory distress. Breath sounds: No wheezing. Abdominal:      Palpations: Abdomen is soft. Tenderness: There is no abdominal tenderness. Musculoskeletal:      Cervical back: Neck supple. Lymphadenopathy:      Head:      Right side of head: No submental adenopathy. Left side of head: No submental adenopathy. Cervical: No cervical adenopathy. Skin:     General: Skin is warm. Capillary Refill: Capillary refill takes less than 2 seconds. Neurological:      Mental Status: She is alert and oriented to person, place, and time. Cranial Nerves: No cranial nerve deficit. Psychiatric:         Behavior: Behavior normal.         Predictors of intubation difficulty:   Morbid obesity? yes -BMI 61.4   Anatomically abnormal facies? no   Short, thick neck? yes   Neck range of motion: normal    Lose, or missing teeth.     Cardiographics  ECG: normal sinus rhythm, no blocks or conduction defects, no ischemic changes  Echocardiogram: not done    Imaging  Chest X-Ray: Pulmonary vascular congestion     Lab Review   Admission on 12/23/2021, Discharged on 12/24/2021   Component Date Value    Troponin, High Sensitivi* 12/23/2021 <6     Troponin T 12/23/2021 NOT REPORTED     Troponin Interp 12/23/2021 NOT REPORTED     Pro-BNP 12/23/2021 <20     BNP Interpretation 12/23/2021 NOT REPORTED     Ventricular Rate 12/23/2021 130     Atrial Rate 12/23/2021 130     P-R Interval 12/23/2021 160     QRS Duration 12/23/2021 78     Q-T Interval 12/23/2021 292     QTc Calculation (Bazett) 12/23/2021 429     P Axis 12/23/2021 73     R Axis 12/23/2021 57     T Axis 12/23/2021 66     D-Dimer, Quant 12/23/2021 1.49     Specimen Description 12/23/2021 . NASOPHARYNGEAL SWAB     SARS-CoV-2, Rapid 12/23/2021 Not Detected     WBC 12/23/2021 8.5     RBC 12/23/2021 4.47     Hemoglobin 12/23/2021 14.7     Hematocrit 12/23/2021 44.7     MCV 12/23/2021 100.0     MCH 12/23/2021 32.9     MCHC 12/23/2021 32.9     RDW 12/23/2021 13.3     Platelets 51/93/7926 237     MPV 12/23/2021 10.4     NRBC Automated 12/23/2021 0.0     Differential Type 12/23/2021 NOT REPORTED     Seg Neutrophils 12/23/2021 29*    Lymphocytes 12/23/2021 46*    Monocytes 12/23/2021 8     Eosinophils % 12/23/2021 15*    Basophils 12/23/2021 1     Immature Granulocytes 12/23/2021 1*    Segs Absolute 12/23/2021 2.48     Absolute Lymph # 12/23/2021 3.97*    Absolute Mono # 12/23/2021 0.65     Absolute Eos # 12/23/2021 1.25*    Basophils Absolute 12/23/2021 0.08     Absolute Immature Granul* 12/23/2021 0.04     WBC Morphology 12/23/2021 NOT REPORTED     RBC Morphology 12/23/2021 NOT REPORTED     Platelet Estimate 24/37/7281 NOT REPORTED     pH, [place] 12/23/2021 7.292*    pCO2, [place] 12/23/2021 57.3*    pO2, [place] 12/23/2021 43.4     HCO3, Venous 12/23/2021 26.8     Positive Base Excess, [place] 12/23/2021 NOT REPORTED     Negative Base Excess, [place] 12/23/2021 0.6     O2 Sat, [place] 12/23/2021 74.0     Total Hb 12/23/2021 NOT REPORTED     Oxyhemoglobin 12/23/2021 NOT REPORTED     Carboxyhemoglobin 12/23/2021 3.3     Methemoglobin 12/23/2021 NOT REPORTED     Pt Temp 12/23/2021 37.0     pH, [place], Temp Adj 12/23/2021 NOT REPORTED     pCO2, [place], Temp Adj 12/23/2021 NOT REPORTED     pO2, [place], Temp Adj 12/23/2021 NOT REPORTED     O2 Device/Flow/% 12/23/2021 NOT REPORTED     Respiratory Rate 12/23/2021 NOT REPORTED    Michelle [place] Test 12/23/2021 NOT REPORTED     Sample Site 12/23/2021 NOT REPORTED     Pt. Position 12/23/2021 NOT REPORTED     Mode 12/23/2021 NOT REPORTED     Set Rate 12/23/2021 NOT REPORTED     Total Rate 12/23/2021 NOT REPORTED     VT 12/23/2021 NOT REPORTED     FIO2 12/23/2021 INFORMATION NOT PROVIDED     Peep/Cpap 12/23/2021 NOT REPORTED     PSV 12/23/2021 NOT REPORTED     Text for Respiratory 12/23/2021 NOT REPORTED     NOTIFICATION 12/23/2021 NOT REPORTED     NOTIFICATION TIME 12/23/2021 NOT REPORTED     Glucose 12/23/2021 146*    BUN 12/23/2021 15     CREATININE 12/23/2021 0.70     Bun/Cre Ratio 12/23/2021 NOT REPORTED     Calcium 12/23/2021 9.8     Sodium 12/23/2021 140     Potassium 12/23/2021 4.2     Chloride 12/23/2021 103     CO2 12/23/2021 21     Anion Gap 12/23/2021 16     GFR Non- 12/23/2021 >60     GFR  12/23/2021 >60     GFR Comment 12/23/2021          GFR Staging 12/23/2021 NOT REPORTED     Ventricular Rate 12/24/2021 91     Atrial Rate 12/24/2021 91     P-R Interval 12/24/2021 160     QRS Duration 12/24/2021 84     Q-T Interval 12/24/2021 362     QTc Calculation (Bazett) 12/24/2021 445     P Axis 12/24/2021 36     R Axis 12/24/2021 39     T Axis 12/24/2021 46          Assessment:        46 y.o. female with planned surgery as above. Known risk factors for perioperative complications: Chronic pulmonary disease    Difficulty with intubation is not anticipated.     Cardiac Risk Estimation: per the Revised Cardiac Risk Index (Circ. 100:1043, 1999), the patient's risk factors for cardiac complications include n/a, putting her in: RCI RISK CLASS I (0 risk factors, risk of major cardiac compl. appr. 0.5%)    Current medications which may produce withdrawal symptoms if withheld perioperatively: Patient on Xanax as needed       Plan:      1. Preoperative workup as follows hemoglobin, hematocrit, electrolytes, glucose  2. Change in medication regimen before surgery: none, continue med regimen including morning of surgery, w/sip of water  3. Prophylaxis for cardiac events with perioperative beta-blockers: not indicated    Risk factors unchanged compared to previous exam in June. Patient does have increased risk factors due to obesity, COPD, and sleep apnea. Reviewed most recent EKG from December, sinus rhythm. No acute lung changes at this time. Surgeon plans to have labs monitored and March, closer to surgical date.

## 2022-03-08 ENCOUNTER — TELEPHONE (OUTPATIENT)
Dept: GASTROENTEROLOGY | Age: 52
End: 2022-03-08

## 2022-04-20 ENCOUNTER — TELEPHONE (OUTPATIENT)
Dept: PRIMARY CARE CLINIC | Age: 52
End: 2022-04-20

## 2022-04-20 DIAGNOSIS — R73.01 IMPAIRED FASTING BLOOD SUGAR: ICD-10-CM

## 2022-04-20 DIAGNOSIS — Z01.818 PREOPERATIVE TESTING: Primary | ICD-10-CM

## 2022-04-20 NOTE — TELEPHONE ENCOUNTER
David Guillentasia states patient had preop lab work completed, planning a left total hip on 4/22/2022. Glucose level over 200. At this time that we should have A1c drawn to verify presence of type 2 diabetes.   Nedra's office will contact the patient in regards to lab results and new lab order

## 2022-04-21 ENCOUNTER — TELEPHONE (OUTPATIENT)
Dept: PRIMARY CARE CLINIC | Age: 52
End: 2022-04-21

## 2022-04-21 ENCOUNTER — HOSPITAL ENCOUNTER (OUTPATIENT)
Age: 52
Discharge: HOME | End: 2022-04-21
Payer: MEDICARE

## 2022-04-21 DIAGNOSIS — Z01.818 PREOPERATIVE TESTING: ICD-10-CM

## 2022-04-21 DIAGNOSIS — R73.01 IMPAIRED FASTING BLOOD SUGAR: ICD-10-CM

## 2022-04-21 LAB
ESTIMATED AVERAGE GLUCOSE: 128 MG/DL
GLUCOSE BLD-MCNC: 162 MG/DL (ref 70–99)
HBA1C MFR BLD: 6.1 % (ref 4–6)

## 2022-04-21 PROCEDURE — 83036 HEMOGLOBIN GLYCOSYLATED A1C: CPT

## 2022-04-21 PROCEDURE — 82947 ASSAY GLUCOSE BLOOD QUANT: CPT

## 2022-04-21 PROCEDURE — 36415 COLL VENOUS BLD VENIPUNCTURE: CPT

## 2022-04-21 NOTE — TELEPHONE ENCOUNTER
Pt called in highly upset, very rude and disrespectful. Demanding to speak with pcp and requesting a stat order be placed for A1c. Questioned why it wasn't placed in the first place due to her surgery is tomorrow 4/22. Also stated she will come and sit in the lobby till it is done.

## 2022-04-21 NOTE — TELEPHONE ENCOUNTER
Pt wanted to leave waiting area but wanted to leave the following message for PCP:  She needs a return call so she can express her disappointment in not receiving prompt care. Pt also wants PCP call to surgeon before 4 p.m. to make sure her surgery is not cancelled for tomorrow. Surgeon's number is [phone].

## 2022-04-21 NOTE — TELEPHONE ENCOUNTER
Spoke with Dr. Rafiq Mcwilliams, at this time we are awaiting A1c results. However he does state he is likely suspecting to postpone surgery until patient is medically optimized if A1c is above 7 or 7.2. Will await final results and contact patient as well as orthopedist confirmed final plan of care.

## 2022-04-22 ENCOUNTER — TELEPHONE (OUTPATIENT)
Dept: PRIMARY CARE CLINIC | Age: 52
End: 2022-04-22

## 2022-04-22 ENCOUNTER — OFFICE VISIT (OUTPATIENT)
Dept: PRIMARY CARE CLINIC | Age: 52
End: 2022-04-22
Payer: MEDICARE

## 2022-04-22 VITALS
SYSTOLIC BLOOD PRESSURE: 163 MMHG | HEART RATE: 95 BPM | DIASTOLIC BLOOD PRESSURE: 73 MMHG | OXYGEN SATURATION: 95 % | TEMPERATURE: 98.8 F

## 2022-04-22 DIAGNOSIS — I10 HYPERTENSION, UNSPECIFIED TYPE: ICD-10-CM

## 2022-04-22 DIAGNOSIS — R73.03 PREDIABETES: Primary | ICD-10-CM

## 2022-04-22 PROCEDURE — 99213 OFFICE O/P EST LOW 20 MIN: CPT | Performed by: NURSE PRACTITIONER

## 2022-04-22 RX ORDER — METFORMIN HYDROCHLORIDE 500 MG/1
500 TABLET, EXTENDED RELEASE ORAL
Qty: 90 TABLET | Refills: 1 | Status: SHIPPED | OUTPATIENT
Start: 2022-04-22 | End: 2022-10-18 | Stop reason: SDUPTHER

## 2022-04-22 RX ORDER — GLUCOSAMINE HCL/CHONDROITIN SU 500-400 MG
1 CAPSULE ORAL DAILY
Qty: 300 STRIP | Refills: 0 | Status: SHIPPED | OUTPATIENT
Start: 2022-04-22 | End: 2022-09-29

## 2022-04-22 RX ORDER — LANCETS 30 GAUGE
1 EACH MISCELLANEOUS DAILY
Qty: 300 EACH | Refills: 5 | Status: SHIPPED | OUTPATIENT
Start: 2022-04-22 | End: 2022-09-29

## 2022-04-22 RX ORDER — ENALAPRIL MALEATE 10 MG/1
10 TABLET ORAL DAILY
Qty: 30 TABLET | Refills: 3 | Status: SHIPPED | OUTPATIENT
Start: 2022-04-22 | End: 2022-04-22 | Stop reason: SDUPTHER

## 2022-04-22 RX ORDER — ENALAPRIL MALEATE 10 MG/1
10 TABLET ORAL DAILY
Qty: 90 TABLET | Refills: 1 | Status: SHIPPED | OUTPATIENT
Start: 2022-04-22 | End: 2022-06-16 | Stop reason: SDUPTHER

## 2022-04-22 SDOH — ECONOMIC STABILITY: FOOD INSECURITY: WITHIN THE PAST 12 MONTHS, YOU WORRIED THAT YOUR FOOD WOULD RUN OUT BEFORE YOU GOT MONEY TO BUY MORE.: NEVER TRUE

## 2022-04-22 SDOH — ECONOMIC STABILITY: FOOD INSECURITY: WITHIN THE PAST 12 MONTHS, THE FOOD YOU BOUGHT JUST DIDN'T LAST AND YOU DIDN'T HAVE MONEY TO GET MORE.: NEVER TRUE

## 2022-04-22 ASSESSMENT — PATIENT HEALTH QUESTIONNAIRE - PHQ9
5. POOR APPETITE OR OVEREATING: 1
9. THOUGHTS THAT YOU WOULD BE BETTER OFF DEAD, OR OF HURTING YOURSELF: 0
4. FEELING TIRED OR HAVING LITTLE ENERGY: 1
7. TROUBLE CONCENTRATING ON THINGS, SUCH AS READING THE NEWSPAPER OR WATCHING TELEVISION: 0
3. TROUBLE FALLING OR STAYING ASLEEP: 1
8. MOVING OR SPEAKING SO SLOWLY THAT OTHER PEOPLE COULD HAVE NOTICED. OR THE OPPOSITE, BEING SO FIGETY OR RESTLESS THAT YOU HAVE BEEN MOVING AROUND A LOT MORE THAN USUAL: 0
SUM OF ALL RESPONSES TO PHQ QUESTIONS 1-9: 4
10. IF YOU CHECKED OFF ANY PROBLEMS, HOW DIFFICULT HAVE THESE PROBLEMS MADE IT FOR YOU TO DO YOUR WORK, TAKE CARE OF THINGS AT HOME, OR GET ALONG WITH OTHER PEOPLE: 0
SUM OF ALL RESPONSES TO PHQ QUESTIONS 1-9: 4
2. FEELING DOWN, DEPRESSED OR HOPELESS: 1
6. FEELING BAD ABOUT YOURSELF - OR THAT YOU ARE A FAILURE OR HAVE LET YOURSELF OR YOUR FAMILY DOWN: 0

## 2022-04-22 ASSESSMENT — SOCIAL DETERMINANTS OF HEALTH (SDOH): HOW HARD IS IT FOR YOU TO PAY FOR THE VERY BASICS LIKE FOOD, HOUSING, MEDICAL CARE, AND HEATING?: NOT HARD AT ALL

## 2022-04-22 NOTE — PROGRESS NOTES
[facility]  [address] 6[address] [address]  Dept: [phone]  Dept Fax: [phone]    Marychuy Arredondo (:  1970) is a 46 y.o. female,Established patient, here for evaluation of the following chief complaint(s):  Blood Sugar Problem         ASSESSMENT/PLAN:  1. Prediabetes  -     Blood Gluc Meter Disp-Strips (BLOOD GLUCOSE METER DISPOSABLE) KELLY; Disp-100 each, R-0, NormalDaily and as needed  -     blood glucose monitor strips; 1 strip by Other route daily Test 1 times a day & as needed for symptoms of irregular blood glucose., Other, DAILY Starting 2022, Until Sun 2022, For 30 days, Disp-300 strip, R-0, Normal  -     Lancets MISC; DAILY Starting 2022, Until Sun 2022, For 30 days, Disp-300 each, R-5, Normal  -     metFORMIN (GLUCOPHAGE-XR) 500 MG extended release tablet; Take 1 tablet by mouth daily (with breakfast), Disp-90 tablet, R-1Normal  2. Hypertension, unspecified type  -     enalapril (VASOTEC) 10 MG tablet; Take 1 tablet by mouth daily, Disp-90 tablet, R-1Normal    A1c result returned yesterday at 6.1. Due to patient's comorbidity of morbid obesity and goal of completing hip replacement, agreed to start metformin for prediabetes. With history of diarrhea, will start extended release metformin. She was educated on side effects and also provided testing supplies to monitor for any symptoms of hypoglycemia. Also asking today for blood pressure medication as her blood pressure is elevated today. Agreed to start enalapril. I discussed with the patient that I been in contact with orthopedics. The surgeon was concerned about the increasing glucose levels as well as her increase in weight compared to her previous surgery in the fall, placing her at high risk. I advised her that, ultimately, the choice to cancel her surgery came from the surgeon.   I did agree that checking an A1c was a good idea given the change in levels. I did stop and discuss office expectations with Marco Hung. The office staff has reported the patient was verbally abusive and using expletives both yesterday and today at the . I discussed with the patient that I emphasized with her frustration regarding the cancellation of her surgery. However, cursing and swearing of the office staff is not acceptable or appropriate. I advised the patient that this was not the first time the staff has reported to me that she was verbally abusive during phone or desk interactions. If this behavior were to continue, I would no longer be able to continue as her primary care provider. She became angry and was not receptive to this discussion. She demanded that she record our conversation, which I consented to. Return in about 3 months (around 7/22/2022) for HTN. Subjective   SUBJECTIVE/OBJECTIVE:  Tommy Menendez presents today for further evaluation of elevated blood sugars. She states she is very angry and upset, was planning to have hip replacement completed with Dr. Chet Hyde tomorrow. She was notified yesterday morning that her blood sugar was elevated and they wish to evaluate for diabetes. Patient states that the orthopedics office told her that I advised that they cancel her surgery and that I was concerned about her blood sugar levels. The patient does admit to history of prediabetes, along with a strong family history of diabetes.          Review of Systems   Unable to perform ROS: Other (pt uncooperative)          Objective     DIAGNOSTIC FINDINGS:  CBC:  Lab Results   Component Value Date    WBC 8.5 12/23/2021    HGB 14.7 12/23/2021     12/23/2021       BMP:    Lab Results   Component Value Date     12/23/2021    K 4.2 12/23/2021     12/23/2021    CO2 21 12/23/2021    BUN 15 12/23/2021    CREATININE 0.70 12/23/2021    GLUCOSE 162 04/21/2022       HEMOGLOBIN A1C:   Lab Results   Component Value Date    LABA1C 6.1 04/21/2022       FASTING LIPID PANEL:  Lab Results   Component Value Date    CHOL 172 04/11/2019    HDL 52 04/11/2019    TRIG 294 04/11/2019       Physical Exam  Constitutional:       Appearance: Normal appearance. She is morbidly obese. She is not toxic-appearing. HENT:      Head: Normocephalic. Right Ear: External ear normal.      Left Ear: External ear normal.      Nose: Nose normal.      Mouth/Throat:      Mouth: Mucous membranes are moist.   Eyes:      General: No scleral icterus. Right eye: No discharge. Left eye: No discharge. Conjunctiva/sclera: Conjunctivae normal.   Pulmonary:      Effort: Pulmonary effort is normal.   Musculoskeletal:      Cervical back: Normal range of motion. Skin:     Coloration: Skin is not jaundiced. Neurological:      Mental Status: She is alert and oriented to person, place, and time. Psychiatric:         Mood and Affect: Mood normal. Affect is angry. Behavior: Behavior normal.         Thought Content: Thought content normal.            An electronic signature was used to authenticate this note.     --LAWRENCE Villar - CNP

## 2022-04-22 NOTE — TELEPHONE ENCOUNTER
Pt has appointment with provider today. When checking her in , I was verifying her phone number. Pt rudely rolled her eyes and replied\" Oh my god how many times are you guys going to ask me?!\" I replied that I was only trying to verify her number. She then asked what my name was.

## 2022-05-12 ENCOUNTER — TELEPHONE (OUTPATIENT)
Dept: PRIMARY CARE CLINIC | Age: 52
End: 2022-05-12

## 2022-05-12 DIAGNOSIS — R73.03 PREDIABETES: Primary | ICD-10-CM

## 2022-05-12 DIAGNOSIS — J44.9 CHRONIC OBSTRUCTIVE PULMONARY DISEASE, UNSPECIFIED COPD TYPE (HCC): ICD-10-CM

## 2022-05-12 RX ORDER — ALBUTEROL SULFATE 90 UG/1
AEROSOL, METERED RESPIRATORY (INHALATION)
Qty: 3 EACH | Refills: 1 | Status: SHIPPED | OUTPATIENT
Start: 2022-05-12 | End: 2022-10-18 | Stop reason: SDUPTHER

## 2022-05-12 NOTE — TELEPHONE ENCOUNTER
Patient called and would like a random blood sugar test order be sent to lab so patient can have this test before her orthopedic surgeon visit. Pt would also like one prescription to be sent for 90 days of Albuterol inhalers sent to the [employer] on [facility]- not a 30 day supply with 2 refills - but a prescription for all three months at once - because the co-pay is the same whether she gets a 90 day supply or a 30 day supply. Pt said PCP can send message back through My Chart because she was going to work on getting her password figured out today.

## 2022-05-24 NOTE — TELEPHONE ENCOUNTER
[facility] INFECTIOUS DISEASE PROGRESS NOTE    Winifred Chowdary Patient Status:  Inpatient    1927 MRN [id number]   Location [facility] PROGRESSIVE STEPDOWN UNIT Attending Reshma Francisco,    Hosp Day # 4 PCP Unknown Pcp Outside [facility]       ANTIMICROBIAL THERAPY:   IV Zosyn (-  PO Linezolid ()    Subjective:  Patient seen and examined.  Interval notes reviewed.  Afebrile. Tachypneic. O2 weaned from HFNC to 6L NC. Pt unable to lie still for CT. Possible thoracentesis +/- bronch per Pulm.  Pt altered and poor historian        Allergies:  ALLERGIES:   Allergen Reactions    Morphine RASH    Morphine Other (See Comments)     Unable to assess. Per daughter, she thinks she broke out in a rash       I/O's    Intake/Output Summary (Last 24 hours) at 2022 1250  Last data filed at 2022 1200  Gross per 24 hour   Intake 3384.93 ml   Output 1500 ml   Net 1884.93 ml         ALLERGIES:  Morphine and Morphine     No data recorded  MAP (mmHg)  Av.1  Min: 70  Max: 129          HOSPITAL MEDS   Potassium Standard Replacement Protocol   Does not apply See Admin Instructions    potassium CHLORIDE  20 mEq Intravenous Once    linezolid  600 mg Oral 2 times per day    albuterol  2.5 mg Nebulization TID Resp    acetylcysteine  400 mg Nebulization TID Resp    furosemide  40 mg Intravenous Daily    gabapentin  600 mg Oral TID    midodrine  5 mg Oral TID AC    acetaminophen  500 mg Oral 4 times per day    donepezil  5 mg Oral Nightly    [Held by provider] verapamil  240 mg Oral Daily    melatonin  3 mg Oral Nightly    [Held by provider] losartan  25 mg Oral Daily    sodium chloride (PF)  2 mL Intracatheter 2 times per day    piperacillin-tazobactam (ZOSYN) 30 minute duration IVPB  3.375 g Intravenous 3 times per day    digoxin  125 mcg Oral Once per day on     QUEtiapine  25 mg Oral Daily    QUEtiapine  75 mg Oral Nightly    heparin (porcine)  5,000  As [facility] is refusing to call the patient and tell her themselves, please let Estellendtasia Linda know that she will need to find another pain management location and that [facility] has declined to accept her. Units Subcutaneous 3 times per day        NORepinephrine (LEVOPHED) 8 mg/250 mL in sodium chloride 0.9 % infusion Stopped (05/23/22 0800)    lactated ringers infusion 75 mL/hr at 05/24/22 1200       ALPRAZolam, docusate sodium-sennosides, sodium chloride, acetaminophen       Last Recorded Vitals      SpO2 Readings from Last 3 Encounters:   05/24/22 94%   01/23/21 92%   12/17/20 92%        VITAL SIGNS:     Vital Last Value 24 Hour Range   Temperature 97.5 °F (36.4 °C) (05/24/22 0800) Temp  Min: 97.2 °F (36.2 °C)  Max: 98.2 °F (36.8 °C)   Pulse 99 (05/24/22 1200) Pulse  Min: 61  Max: 99   Respiratory 19 (05/24/22 1200) Resp  Min: 15  Max: 32   Non-Invasive  Blood Pressure (!) 152/116 (05/24/22 1200) BP  Min: 114/51  Max: 163/68   Pulse Oximetry 94 % (05/24/22 1200) SpO2  Min: 92 %  Max: 100 %   Arterial   Blood Pressure   No data recorded      Vital Today Admitted   Weight 50 kg (110 lb 3.7 oz) (05/23/22 0000) Weight: 41.2 kg (90 lb 13.3 oz) (05/20/22 2155)   Height N/A     BMI N/A              Physical Exam:  General: Awake. NAD.  Confused.  HEENT: Moist mucous membranes. Extraocular muscles are intact. Anicteric   Neck: No lymphadenopathy.  No JVD. No carotid bruits.   Respiratory: Clear to auscultation bilaterally.  Some mild inspiratory wheezes. NC +  Cardiovascular: S1, S2.  Regular rate and rhythm.  No murmurs.  Abdomen: Soft, nontender, nondistended.  Positive bowel sounds.  Musculoskeletal: Full range of motion of all extremities.  No swelling noted.  Integument: No lesions. No erythema. No open wounds.  R/L PIVs in place.      Labs   Recent Labs     05/22/22  0319 05/23/22  0343 05/24/22  0239   WBC 8.4 8.8 8.6   RBC 2.83* 3.40* 2.98*   HGB 9.1* 10.7* 9.3*   HCT 27.8* 33.3* 29.2*    231 239   MCV 98.2 97.9 98.0   MCH 32.2 31.5 31.2   MCHC 32.7 32.1 31.8*   NRBCRE 0 0 0         Recent Labs     05/22/22  0319 05/23/22  0343 05/24/22  0239 05/24/22  1145   SODIUM 140 143 143  --    POTASSIUM 3.8 3.3* 2.9*  3.1*   MG 1.9  --  1.7  --    PHOS  --   --  3.7  --    CO2 27 27 33*  --    ANIONGAP 10 11 9  --    GLUCOSE 68* 113* 89  --    BUN 15 10 7  --    CREATININE 0.71 0.66 0.72  --    BCRAT 21 15 10  --    CALCIUM 8.3* 8.3* 8.3*  --    BILIRUBIN 0.4 0.4 0.4  --    AST 34 33 31  --    GPT 19 19 16  --    ALKPT 83 87 81  --    GLOB 3.8 4.1* 4.2*  --    AGR 0.5* 0.4* 0.4*  --         Recent Labs   Lab 05/24/22  1145 05/24/22  0239 05/23/22  0343 05/22/22  0319   SODIUM  --  143 143 140   POTASSIUM 3.1* 2.9* 3.3* 3.8   CHLORIDE  --  104 108 107   CO2  --  33* 27 27   BUN  --  7 10 15   CREATININE  --  0.72 0.66 0.71   GLUCOSE  --  89 113* 68*   ALBUMIN  --  1.8* 1.8* 1.8*   PHOS  --  3.7  --   --    AST  --  31 33 34   BILIRUBIN  --  0.4 0.4 0.4       No results for input(s): PCT in the last 72 hours.     Recent Labs   Lab 05/23/22  0343   NTPROB 3,596*        No results for input(s): INR, PT, PTT in the last 72 hours.    No results available in last 24 hours      Imaging    XR CHEST AP OR PA 1 VIEW   Final Result       Increase in patchy airspace opacities in both lungs suspicious for   aspiration and/or infection.  Increase in size of moderate left and small   right pleural effusions.       Electronically Signed by: JESSI TSE M.D.    Signed on: 5/23/2022 8:49 AM          XR CHEST PA OR AP 1 VIEW   Final Result   Left lower lobe opacity.  Small left pleural effusion.  Right lung base   subsegmental atelectasis.        Electronically Signed by: VAL العراقي MD    Signed on: 5/20/2022 8:51 PM          CT CHEST WO CONTRAST    (Results Pending)   XR CHEST PA OR AP 1 VIEW    (Results Pending)         Cultures  5/20  Blood cx:  No growth  5/21  Blood cx:  No growth    5/20  COVID PCR:  Not detected  5/21  COVID/RSV/Flu:  Not detected    5/21  Legionella urinary Ag:  Negative  5/21  S. Pneumoniae Ag:  Negative  5/23  MRSA PCR: Neg        History of Present Illness:  Winifred Chowdary is a a(n) 89 year old female.  PMH - pAfib, CHF,  HTN, colon CA (s/p resection), dementia, anxiety, osteoporosis, shingles, neck fx.  [facility] d/t fever/hypoxia (o2 sats in 80s).  Placed on 4L NC.  Had a nonproductive cough for a few wks per family.  Confused.      ASSESSMENT:  #  Possible LLL pneumonia/sepsis  #  Acute fever - likely secondary to pneumonia/sepsis  #  Acute hypoxia - likely secondary to pneumonia/sepsis   #  Acute hypotension - likely secondary to pneumonia/sepsis  #  Acute confusion, h/o dementia - ? Secondary to pneumonia/sepsis  #  PAfib, CHF, HTN  #  Colon CA (s/p resection)  #  Anxiety      PLAN:  - Continue IV Zosyn. MRSA nares negative, d/c Linezolid.   - Pt unable to tolerate CT. f/u repeat cxr. Cconsider thoracentesis +/- bronchoscopy   -  Monitor resp status for improvement.  Continue aggressive pulmonary hygiene.  -  Follow labs/vitals.   -  Will follow    D/w pt, staff, Pulm  Reviewed imaging labs and micro  Will follow       Case and plan discussed with Dr. Zaria Arriaza, MPH, PA-C  [facility], [facility]  [phone]  5/24/2022  12:50 PM    .Attending attestation :  I have personally interviewed and examined the patient .   I confirmed the findings listed above and  agree with the assessment and plan as documented.    Zaria  [facility]

## 2022-05-27 ENCOUNTER — HOSPITAL ENCOUNTER (OUTPATIENT)
Age: 52
Discharge: HOME | End: 2022-05-27
Payer: MEDICARE

## 2022-05-27 DIAGNOSIS — R73.03 PREDIABETES: ICD-10-CM

## 2022-05-27 LAB — GLUCOSE BLD-MCNC: 113 MG/DL (ref 70–99)

## 2022-05-27 PROCEDURE — 36415 COLL VENOUS BLD VENIPUNCTURE: CPT

## 2022-05-27 PROCEDURE — 82947 ASSAY GLUCOSE BLOOD QUANT: CPT

## 2022-06-01 ENCOUNTER — OFFICE VISIT (OUTPATIENT)
Dept: PRIMARY CARE CLINIC | Age: 52
End: 2022-06-01
Payer: MEDICARE

## 2022-06-01 VITALS
WEIGHT: 293 LBS | BODY MASS INDEX: 58.31 KG/M2 | SYSTOLIC BLOOD PRESSURE: 118 MMHG | DIASTOLIC BLOOD PRESSURE: 77 MMHG | HEART RATE: 92 BPM

## 2022-06-01 DIAGNOSIS — R73.03 PREDIABETES: Primary | ICD-10-CM

## 2022-06-01 PROCEDURE — 99213 OFFICE O/P EST LOW 20 MIN: CPT | Performed by: NURSE PRACTITIONER

## 2022-06-01 ASSESSMENT — ENCOUNTER SYMPTOMS
CHEST TIGHTNESS: 0
SHORTNESS OF BREATH: 1
SINUS PAIN: 0
WHEEZING: 0
BACK PAIN: 1
NAUSEA: 0
VOMITING: 0
TROUBLE SWALLOWING: 0
DIARRHEA: 0
ABDOMINAL PAIN: 0
BLOOD IN STOOL: 0
COUGH: 0

## 2022-06-01 NOTE — PROGRESS NOTES
[facility]  [address] [address]  Dept: [phone]  Dept Fax: [phone]    Sunny Schuster (:  1970) is a 46 y.o. female,Established patient, here for evaluation of the following chief complaint(s):  COPD (pt said she's here for a pre op appt)         ASSESSMENT/PLAN:  1. Prediabetes    Continue metformin once daily. Most recent fasting glucose level of 113. Consider ultrasound of pelvis due to return of menstrual cycle as patient is not clear if she has been without any menstrual bleeding in the last year, possible intermittent spotting. Educated patient that metformin is used for women with PCOS and that it can reduce testosterone androgens in the ovaries, resulting in normal menstruation. Return in about 3 months (around 2022). Subjective   SUBJECTIVE/OBJECTIVE:  Has been taking Metformin XR daily  FBS are between 120-130, however they improve later in the day    Metformin did increase bowel frequency  Patient that she is also had a return of her menstrual cycles and starting medication. She is not entirely sure how long its been since her last cycle, however she admits she is not her been regular even as a teenager. The cycles are routine, lasting 4 days. Kezia De La Rosa states she did not wish to have her weight measured today, however she was at her orthopedics office this morning and states she weighed in at 383 pounds. The patient states she has made a large effort in monitoring her dietary intake and has joined [employer]. Review of Systems   Constitutional: Positive for diaphoresis. Negative for chills and fever. HENT: Negative for congestion, ear pain, sinus pain, sneezing and trouble swallowing. Respiratory: Positive for shortness of breath. Negative for cough, chest tightness and wheezing. Cardiovascular: Negative for chest pain and palpitations. Gastrointestinal: Negative for abdominal pain, blood in stool, diarrhea, nausea and vomiting. Genitourinary: Negative for difficulty urinating, dysuria, frequency and hematuria. Musculoskeletal: Positive for arthralgias and back pain. Skin: Negative for rash and wound. Allergic/Immunologic: Negative for immunocompromised state. Neurological: Negative for dizziness, seizures and weakness. Hematological: Does not bruise/bleed easily. Objective     DIAGNOSTIC FINDINGS:  CBC:  Lab Results   Component Value Date    WBC 8.5 12/23/2021    HGB 14.7 12/23/2021     12/23/2021       BMP:    Lab Results   Component Value Date     12/23/2021    K 4.2 12/23/2021     12/23/2021    CO2 21 12/23/2021    BUN 15 12/23/2021    CREATININE 0.70 12/23/2021    GLUCOSE 113 05/27/2022       HEMOGLOBIN A1C:   Lab Results   Component Value Date    LABA1C 6.1 04/21/2022       FASTING LIPID PANEL:  Lab Results   Component Value Date    CHOL 172 04/11/2019    HDL 52 04/11/2019    TRIG 294 04/11/2019       Physical Exam  Constitutional:       Appearance: Normal appearance. She is morbidly obese. She is not toxic-appearing. HENT:      Head: Normocephalic. Right Ear: External ear normal.      Left Ear: External ear normal.      Nose: Nose normal.      Mouth/Throat:      Mouth: Mucous membranes are moist.   Eyes:      General: No scleral icterus. Right eye: No discharge. Left eye: No discharge. Conjunctiva/sclera: Conjunctivae normal.   Pulmonary:      Effort: Pulmonary effort is normal.   Musculoskeletal:      Cervical back: Normal range of motion. Skin:     Coloration: Skin is not jaundiced. Neurological:      Mental Status: She is alert and oriented to person, place, and time. Psychiatric:         Mood and Affect: Mood normal.         Behavior: Behavior normal.         Thought Content:  Thought content normal.            An electronic signature was used to authenticate this note.    --Bonilla Gipson, LAWRENCE - CNP

## 2022-06-01 NOTE — LETTER
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 Her most recent fasting glucose level reading is 113. She is taking metformin once daily for prediabetic management. At this time I believe she is medically optimized for surgery.            Sincerely,        LAWRENCE Dudley - CNP

## 2022-06-01 NOTE — PROGRESS NOTES
Visit Information    Have you changed or started any medications since your last visit including any over-the-counter medicines, vitamins, or herbal medicines? yes - tyenlol vitamin   Are you having any side effects from any of your medications? -  no  Have you stopped taking any of your medications? Is so, why? -  no    Have you seen any other physician or provider since your last visit? Yes - Records Obtained  Have you had any other diagnostic tests since your last visit? Yes - Records Obtained  Have you been seen in the emergency room and/or had an admission to a hospital since we last saw you? No  Have you had your routine dental cleaning in the past 6 months? no    Have you activated your [employer] account? If not, what are your barriers?  Yes     Patient Care Team:  LAWRENCE Villar CNP as PCP - General (Family Medicine)  LAWRENCE Villar CNP as PCP [facility]  Yao Callejas MD as Consulting Physician (Gastroenterology)    Medical History Review  Past Medical, Family, and Social History reviewed and does contribute to the patient presenting condition    Health Maintenance   Topic Date Due    Annual Wellness Visit (AWV)  Never done    HIV screen  Never done    Hepatitis C screen  Never done    Cervical cancer screen  Never done    Colorectal Cancer Screen  Never done    Pneumococcal 0-64 years Vaccine (2 - PCV) 07/11/2017    Breast cancer screen  Never done    Shingles vaccine (1 of 2) Never done    Low dose CT lung screening  Never done    COVID-19 Vaccine (3 - Booster for [employer] series) 10/20/2021    A1C test (Diabetic or Prediabetic)  04/21/2023    Depression Monitoring  04/22/2023    Lipids  04/11/2024    DTaP/Tdap/Td vaccine (2 - Td or Tdap) 08/18/2027    Flu vaccine  Completed    Hepatitis A vaccine  Aged Out    Hepatitis B vaccine  Aged Out    Hib vaccine  Aged Out    Meningococcal (ACWY) vaccine  Aged Out

## 2022-06-07 ENCOUNTER — PATIENT MESSAGE (OUTPATIENT)
Dept: PRIMARY CARE CLINIC | Age: 52
End: 2022-06-07

## 2022-06-08 NOTE — TELEPHONE ENCOUNTER
From: Sj Alvarez  To: Cleola Canavan  Sent: 6/7/2022 4:36 PM EDT  Subject: Surgery release letter    Hi,    Just following up on the release letter for my knee replacement. I received a message that orthopedic office has not received my release. Can you please resend it when you have a moment. Thank you!   Honey Milton

## 2022-06-16 DIAGNOSIS — I10 HYPERTENSION, UNSPECIFIED TYPE: ICD-10-CM

## 2022-06-16 RX ORDER — ENALAPRIL MALEATE 10 MG/1
10 TABLET ORAL DAILY
Qty: 100 TABLET | Refills: 1 | Status: SHIPPED | OUTPATIENT
Start: 2022-06-16

## 2022-08-02 ENCOUNTER — TELEPHONE (OUTPATIENT)
Dept: PRIMARY CARE CLINIC | Age: 52
End: 2022-08-02

## 2022-08-02 DIAGNOSIS — R73.03 PREDIABETES: Primary | ICD-10-CM

## 2022-08-02 NOTE — TELEPHONE ENCOUNTER
Pt is having left hip replacement on 8/19/22. Will need updated surgical clearance letter from PCP. Fax to [phone].

## 2022-08-02 NOTE — LETTER
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 At this time I believe she is medically optimized for surgery.            Sincerely,        LAWRENCE Georges CNP

## 2022-08-03 ENCOUNTER — TELEPHONE (OUTPATIENT)
Dept: PRIMARY CARE CLINIC | Age: 52
End: 2022-08-03

## 2022-08-03 DIAGNOSIS — Z01.818 PREOP EXAMINATION: ICD-10-CM

## 2022-08-03 DIAGNOSIS — R73.03 PREDIABETES: Primary | ICD-10-CM

## 2022-08-03 NOTE — TELEPHONE ENCOUNTER
Spoke with pt, she stated that she will have it done with her pre op testing that she has done on Friday. Poct a1c will need canceled. Pended lab a1c.

## 2022-08-03 NOTE — TELEPHONE ENCOUNTER
Pt was returning a call in regards to the A1C request for surgery. Pt stated that she was never sent to anyone for recommendations for diet or anything, and was just given a meter to track BS. Pt expressed concern if A1C is too high will she have to cancel surgery, pt stated that she cannot reschedule or cancel surgery. Pt is requesting to speak with provider directly. Listener did voice to patient that provider was in with patients and unsure when provider would be able to call.

## 2022-08-03 NOTE — TELEPHONE ENCOUNTER
Her A1c was originally 6.1, since then she has lost weight and been taking metformin. She has also reported good and appropriate fasting glucose levels. I am only requesting the A1c because I do not wish for her orthopedic to decide they want a level right before the day of her surgery like they did last time. If she does not want the A1c, I can send a letter saying that she is medically optimized.

## 2022-08-03 NOTE — TELEPHONE ENCOUNTER
I am happy to write the letter. Due to the previous circumstances, I do think it be appropriate to repeat the A1c as it has been more than 3 months since her last check in April. While her levels were considered prediabetic, do believe her surgeon will request a more recent A1c along with my letter.   I placed an order for an office point-of-care, so she can come in for nurse visit and we can poke her finger in the office rather than her having to come to the lab and wait 24 hours for results

## 2022-08-23 ENCOUNTER — TELEPHONE (OUTPATIENT)
Dept: PRIMARY CARE CLINIC | Age: 52
End: 2022-08-23

## 2022-08-23 NOTE — TELEPHONE ENCOUNTER
Patient was called d/t incorrect telephone number given for Dr. Armen Denver. Pt wanted us to request medical records from Dr. Armen Denver. Pt will call us back with correct telephone number.

## 2022-09-12 ENCOUNTER — TELEPHONE (OUTPATIENT)
Dept: PRIMARY CARE CLINIC | Age: 52
End: 2022-09-12

## 2022-09-16 ENCOUNTER — TELEPHONE (OUTPATIENT)
Dept: PRIMARY CARE CLINIC | Age: 52
End: 2022-09-16

## 2022-09-16 NOTE — TELEPHONE ENCOUNTER
FYI:   [facility] home care called and stated that patient canceled her visit today. Home care stated patient was very rude and would not reschedule patient stated home health was no better than the last one and hung up on the home health. The incident was reported to supervisor.

## 2022-09-20 ENCOUNTER — TELEPHONE (OUTPATIENT)
Dept: PRIMARY CARE CLINIC | Age: 52
End: 2022-09-20

## 2022-09-20 NOTE — TELEPHONE ENCOUNTER
----- Message from Diya Gomez sent at 9/20/2022  8:32 AM EDT -----  Subject: Message to Provider    QUESTIONS  Information for Provider? Princess Kevin with [facility] called to state that patient   is cancelling the home health care service with them. The patient will   call to get another referral. If you need to reach out to Princess Kevin at   [phone]  ---------------------------------------------------------------------------  --------------  [address] [employer]  [phone]; OK to leave message on voicemail  ---------------------------------------------------------------------------  --------------  SCRIPT ANSWERS  Relationship to Patient? Third Party  Third Party Type? Home Health Care? Representative Name?  Princess Kevin

## 2022-09-20 NOTE — TELEPHONE ENCOUNTER
----- Message from Akshat Rodriguez sent at 9/20/2022  8:32 AM EDT -----  Subject: Message to Provider    QUESTIONS  Information for Provider? Coral Free with [employer] called to state that patient   is cancelling the home health care service with them. The patient will   call to get another referral. If you need to reach out to Marisa Toro at   [phone]  ---------------------------------------------------------------------------  --------------  [address]  [phone]; OK to leave message on voicemail  ---------------------------------------------------------------------------  --------------  SCRIPT ANSWERS  Relationship to Patient? Third Party  Third Party Type? Home Health Care? Representative Name?  Marisa Toro

## 2022-09-27 ENCOUNTER — HOSPITAL ENCOUNTER (OUTPATIENT)
Dept: MAMMOGRAPHY | Age: 52
Discharge: HOME | End: 2022-09-29
Payer: MEDICARE

## 2022-09-27 DIAGNOSIS — Z12.31 ENCOUNTER FOR SCREENING MAMMOGRAM FOR BREAST CANCER: ICD-10-CM

## 2022-09-27 PROCEDURE — 77063 BREAST TOMOSYNTHESIS BI: CPT

## 2022-09-29 ENCOUNTER — OFFICE VISIT (OUTPATIENT)
Dept: PRIMARY CARE CLINIC | Age: 52
End: 2022-09-29
Payer: MEDICARE

## 2022-09-29 ENCOUNTER — TELEPHONE (OUTPATIENT)
Dept: PRIMARY CARE CLINIC | Age: 52
End: 2022-09-29

## 2022-09-29 VITALS
HEART RATE: 95 BPM | OXYGEN SATURATION: 93 % | BODY MASS INDEX: 45.99 KG/M2 | SYSTOLIC BLOOD PRESSURE: 131 MMHG | TEMPERATURE: 97.3 F | HEIGHT: 67 IN | DIASTOLIC BLOOD PRESSURE: 82 MMHG | WEIGHT: 293 LBS

## 2022-09-29 DIAGNOSIS — F31.32 BIPOLAR AFFECTIVE DISORDER, CURRENTLY DEPRESSED, MODERATE (HCC): ICD-10-CM

## 2022-09-29 DIAGNOSIS — Z23 NEED FOR VACCINATION: Primary | ICD-10-CM

## 2022-09-29 DIAGNOSIS — Z80.0 FAMILY HISTORY OF COLON CANCER: ICD-10-CM

## 2022-09-29 DIAGNOSIS — Z12.11 COLON CANCER SCREENING: ICD-10-CM

## 2022-09-29 PROCEDURE — 99396 PREV VISIT EST AGE 40-64: CPT | Performed by: NURSE PRACTITIONER

## 2022-09-29 PROCEDURE — 90674 CCIIV4 VAC NO PRSV 0.5 ML IM: CPT | Performed by: NURSE PRACTITIONER

## 2022-09-29 PROCEDURE — G0008 ADMIN INFLUENZA VIRUS VAC: HCPCS | Performed by: NURSE PRACTITIONER

## 2022-09-29 PROCEDURE — 90471 IMMUNIZATION ADMIN: CPT | Performed by: NURSE PRACTITIONER

## 2022-09-29 RX ORDER — BACLOFEN 20 MG/1
20 TABLET ORAL 3 TIMES DAILY
COMMUNITY
Start: 2022-07-06

## 2022-09-29 RX ORDER — ZOSTER VACCINE RECOMBINANT, ADJUVANTED 50 MCG/0.5
0.5 KIT INTRAMUSCULAR SEE ADMIN INSTRUCTIONS
Qty: 0.5 ML | Refills: 0 | Status: SHIPPED | OUTPATIENT
Start: 2022-09-29 | End: 2023-03-28

## 2022-09-29 ASSESSMENT — ENCOUNTER SYMPTOMS
DIARRHEA: 0
WHEEZING: 0
ABDOMINAL PAIN: 0
SINUS PAIN: 0
BACK PAIN: 1
TROUBLE SWALLOWING: 0
VOMITING: 0
COUGH: 0
SHORTNESS OF BREATH: 0
NAUSEA: 0
BLOOD IN STOOL: 0
CHEST TIGHTNESS: 0

## 2022-09-29 NOTE — PROGRESS NOTES
[facility]  [id number] [address]  Dept: [phone]  Dept Fax: [phone]    Patient Care Team:  LAWRENCE Barrera CNP as PCP - General (Family Medicine)  LAWRENCE Barrera CNP as PCP - [facility] Empaneled Provider  Geraldine Cespedes MD as Consulting Physician (Gastroenterology)    2022     Rell Costello (:  1970)is a 46 y.o. female, here for evaluation of the following medical concerns:   Chief Complaint   Patient presents with    Follow-Up from Hospital     Followup rehab from physical therapy     Patient underwent right total hip replacement on 2022 with Dr. Wilfredo Gabriel states she presents today for an annual physical.    Doing well after hip surgery, pain and mobility has greatly improved. Was discharge form SNF Sep 4th, starts OP PT today at [address]    Last A1C was 6.0    States she had mammogram 2 days ago, no breast concerns. Admits she needs a colonoscopy, her brother was recently diagnosed with colon cancer. Has not had a COVID booster in the last 6 months. .    Review of Systems   Constitutional:  Negative for chills, diaphoresis and fever. HENT:  Negative for congestion, ear pain, sinus pain, sneezing and trouble swallowing. Respiratory:  Negative for cough, chest tightness, shortness of breath and wheezing. Cardiovascular:  Negative for chest pain and palpitations. Gastrointestinal:  Negative for abdominal pain, blood in stool, diarrhea, nausea and vomiting. Genitourinary:  Negative for difficulty urinating, dysuria, frequency and hematuria. Musculoskeletal:  Positive for arthralgias and back pain. Skin:  Negative for rash and wound. Allergic/Immunologic: Negative for immunocompromised state. Neurological:  Negative for dizziness, seizures, syncope and weakness. Hematological:  Does not bruise/bleed easily. Psychiatric/Behavioral:  Negative for dysphoric mood. Prior to Visit Medications    Medication Sig Taking? Authorizing Provider   zoster recombinant adjuvanted vaccine [facility]) 50 MCG/0.5ML SUSR injection Inject 0.5 mLs into the muscle See Admin Instructions 1 dose now and repeat in 2-6 months Yes LAWRENCE Goldberg CNP   albuterol sulfate  (90 Base) MCG/ACT inhaler inhale 1 puff by mouth and INTO THE LUNGS every 4 hours if needed for wheezing Yes LAWRENCE Goldberg CNP   Blood Gluc Meter Disp-Strips (BLOOD GLUCOSE METER DISPOSABLE) KELLY Daily and as needed Yes LAWRENCE Goldberg CNP   blood glucose monitor strips 1 strip by Other route daily Test 1 times a day & as needed for symptoms of irregular blood glucose. Yes LAWRENCE Goldberg CNP   Lancets MISC 1 each by Does not apply route daily Yes LAWRENCE Goldberg CNP   metFORMIN (GLUCOPHAGE-XR) 500 MG extended release tablet Take 1 tablet by mouth daily (with breakfast) Yes LAWRENCE Goldberg CNP   VRAYLAR 3 MG CAPS capsule take 1 capsule by mouth every morning Yes Historical Provider, MD   albuterol (PROVENTIL) (2.5 MG/3ML) 0.083% nebulizer solution Take 3 mLs by nebulization every 4 hours as needed for Wheezing Yes LAWRENCE Goldberg CNP   fluticasone-vilanterol (BREO ELLIPTA) 100-25 MCG/INH AEPB inhaler Inhale 1 puff into the lungs daily Yes LAWRENCE Goldberg CNP   gabapentin (NEURONTIN) 600 MG tablet Take 600 mg by mouth 3 times daily. Yes Historical Provider, MD   venlafaxine (EFFEXOR XR) 150 MG extended release capsule  Yes Historical Provider, MD   ALPRAZolam (XANAX) 0.5 MG tablet Take 0.5 mg by mouth nightly as needed for Sleep.  Yes Historical Provider, MD   baclofen (LIORESAL) 20 MG tablet Take 20 mg by mouth 3 times daily  Historical Provider, MD   enalapril (VASOTEC) 10 MG tablet Take 1 tablet by mouth daily  Patient not taking: Reported on 9/29/2022  LAWRENCE Goldberg CNP        Social History     Tobacco Use    Smoking status: Former     Packs/day: 2.00     Years: 20.00     Pack years: 40.00     Types: Cigarettes     Quit date: 1/10/2010     Years since quittin.7    Smokeless tobacco: Never   Substance Use Topics    Alcohol use: Yes     Comment: rare        Vitals:    22 1227   BP: 131/82   Pulse: 95   Temp: 97.3 °F (36.3 °C)   SpO2: 93%   Weight: (!) 378 lb (171.5 kg)   Height: 5' 7\" (1.702 m)     Estimated body mass index is 59.2 kg/m² as calculated from the following:    Height as of this encounter: 5' 7\" (1.702 m). Weight as of this encounter: 378 lb (171.5 kg). DIAGNOSTIC FINDINGS:  CBC:  Lab Results   Component Value Date/Time    WBC 8.5 2021 02:09 PM    HGB 14.7 2021 02:09 PM     2021 02:09 PM       BMP:    Lab Results   Component Value Date/Time     2021 02:09 PM    K 4.2 2021 02:09 PM     2021 02:09 PM    CO2 21 2021 02:09 PM    BUN 15 2021 02:09 PM    CREATININE 0.70 2021 02:09 PM    GLUCOSE 113 2022 11:55 AM       HEMOGLOBIN A1C:   Lab Results   Component Value Date/Time    LABA1C 6.1 2022 11:43 AM       FASTING LIPID PANEL:  Lab Results   Component Value Date    CHOL 172 2019    HDL 52 2019    TRIG 294 2019       Physical Exam  Vitals reviewed. Constitutional:       General: She is not in acute distress. Appearance: She is well-developed. She is morbidly obese. She is not ill-appearing. HENT:      Head: Normocephalic. Right Ear: Tympanic membrane and external ear normal. Tympanic membrane is not erythematous or bulging. Left Ear: Tympanic membrane and external ear normal. Tympanic membrane is not erythematous or bulging. Nose:      Right Sinus: No maxillary sinus tenderness or frontal sinus tenderness. Left Sinus: No maxillary sinus tenderness or frontal sinus tenderness. Mouth/Throat:      Mouth: Mucous membranes are not dry. mL     Refill:  0         Breast cancer screening up-to-date  Vaccine counseling provided today, given prescription for shingles vaccine and encouraged to schedule with outpatient pharmacy for by Mary Beth Vivar booster  GI referral placed for colonoscopy for colon cancer screening, patient agreeable at this time. Encourage patient to schedule cervical cancer screen with gynecology in the next 3 to 6 months    FOLLOW UP AND INSTRUCTIONS:  Return in about 4 months (around 1/29/2023) for Asthma, HTN, copd. Irvin Jericho received counseling on the following healthy behaviors:nutrition and exercise    Discussed use, benefit, and side effects of prescribed medications. Barriers to  medication compliance addressed. All patient questions answered. Pt  verbalized understanding of all instructions given. Patient given educational materials - see patient instructions      Patient advised to contact scheduling offices for any referrals or imaging orders  placed today if they have not been contacted in 48 hours. Return in about 4 months (around 1/29/2023) for Asthma, HTN, copd. An electronic signature was used to authenticate this note.     --LAWRENCE Dyer - CNP on 9/29/2022 at 1:32 PM

## 2022-09-29 NOTE — TELEPHONE ENCOUNTER
[facility] Referral, demographics and last office note faxed to [phone].

## 2022-10-18 DIAGNOSIS — J44.9 CHRONIC OBSTRUCTIVE PULMONARY DISEASE, UNSPECIFIED COPD TYPE (HCC): ICD-10-CM

## 2022-10-18 DIAGNOSIS — R73.03 PREDIABETES: ICD-10-CM

## 2022-10-18 RX ORDER — ALBUTEROL SULFATE 90 UG/1
AEROSOL, METERED RESPIRATORY (INHALATION)
Qty: 3 EACH | Refills: 1 | Status: SHIPPED | OUTPATIENT
Start: 2022-10-18 | End: 2023-03-07

## 2022-10-18 RX ORDER — METFORMIN HYDROCHLORIDE 500 MG/1
500 TABLET, EXTENDED RELEASE ORAL
Qty: 90 TABLET | Refills: 1 | Status: SHIPPED | OUTPATIENT
Start: 2022-10-18

## 2022-10-18 NOTE — TELEPHONE ENCOUNTER
Leukocytosis     Sleep apnea     Bipolar 1 disorder (HCC)     Anxiety     COPD exacerbation (HCC)     Benign essential HTN     Major depression     Abnormal TSH     Chronic low back pain     Fibromyalgia     Hip pain     Hip pain, bilateral     Chronic pain of both knees     Hyperlipidemia     Family history of diseases of the ms sys and connective tiss     Lumbar stenosis with neurogenic claudication     Migraines     Personal history of nicotine dependence     Primary osteoarthritis of both hips     Primary osteoarthritis of both knees     Seasonal allergies     COPD (chronic obstructive pulmonary disease) ([facility]

## 2022-11-10 ENCOUNTER — TELEMEDICINE (OUTPATIENT)
Dept: PRIMARY CARE CLINIC | Age: 52
End: 2022-11-10
Payer: MEDICARE

## 2022-11-10 DIAGNOSIS — J06.9 VIRAL URI WITH COUGH: ICD-10-CM

## 2022-11-10 DIAGNOSIS — J44.1 COPD EXACERBATION (HCC): Primary | ICD-10-CM

## 2022-11-10 PROCEDURE — 99213 OFFICE O/P EST LOW 20 MIN: CPT | Performed by: NURSE PRACTITIONER

## 2022-11-10 RX ORDER — PREDNISONE 20 MG/1
20 TABLET ORAL DAILY
Qty: 5 TABLET | Refills: 0 | Status: SHIPPED | OUTPATIENT
Start: 2022-11-10 | End: 2022-11-15

## 2022-11-10 RX ORDER — AZITHROMYCIN 250 MG/1
250 TABLET, FILM COATED ORAL SEE ADMIN INSTRUCTIONS
Qty: 6 TABLET | Refills: 0 | Status: SHIPPED | OUTPATIENT
Start: 2022-11-10 | End: 2022-11-15

## 2022-11-10 RX ORDER — BENZONATATE 100 MG/1
100 CAPSULE ORAL 3 TIMES DAILY PRN
Qty: 30 CAPSULE | Refills: 0 | Status: SHIPPED | OUTPATIENT
Start: 2022-11-10 | End: 2022-11-17

## 2022-11-10 ASSESSMENT — ENCOUNTER SYMPTOMS
WHEEZING: 1
BLOOD IN STOOL: 0
SINUS PAIN: 1
VOMITING: 0
DIARRHEA: 1
COUGH: 1
SORE THROAT: 1
SHORTNESS OF BREATH: 1
TROUBLE SWALLOWING: 0

## 2022-11-10 NOTE — PROGRESS NOTES
Bradley Gonzalez is a 46 y.o. female evaluated virtual visit via [address] on 11/10/2022. Consent:  She and/or health care decision maker is aware that that she may receive a bill for this telephone service, depending on her insurance coverage, and has provided verbal consent to proceed: Yes      Documentation:  I communicated with the patient and/or health care decision maker about cough, SOB. Details of this discussion including any medical advice provided below      I affirm this is a Patient Initiated Episode with an Established Patient who has not had a related appointment within my department in the past 7 days or scheduled within the next 24 hours. Total Time: minutes: 11-20 minutes    Note: not billable if this call serves to triage the patient into an appointment for the relevant concern      LAWRENCE Dorsey CNP                  11/10/2022    TELEHEALTH EVALUATION -- Audio/Visual (During [address] emergency)    Patient and physician are located in their individual homes    HPI:    Bradley Gonzalez (:  1970) has requested an audio only/video evaluation for the following concern(s):        URI   This is a new problem. The current episode started in the past 7 days. The maximum temperature recorded prior to her arrival was 100.4 - 100.9 F. Associated symptoms include coughing, diarrhea, ear pain, headaches, sinus pain, a sore throat and wheezing. Pertinent negatives include no dysuria or vomiting. She has tried inhaler use for the symptoms. Review of Systems   Constitutional:  Positive for fever. HENT:  Positive for ear pain, sinus pain and sore throat. Negative for trouble swallowing. Respiratory:  Positive for cough, shortness of breath and wheezing. Gastrointestinal:  Positive for diarrhea. Negative for blood in stool and vomiting. Genitourinary:  Negative for decreased urine volume and dysuria. Neurological:  Positive for headaches.      Prior to Visit Medications    Medication Sig Taking? Authorizing Provider   azithromycin (ZITHROMAX) 250 MG tablet Take 1 tablet by mouth See Admin Instructions for 5 days 500mg on day 1 followed by 250mg on days 2 - 5 Yes LAWRENCE Marie CNP   predniSONE (DELTASONE) 20 MG tablet Take 1 tablet by mouth daily for 5 days Yes LAWRENCE Marie CNP   benzonatate (TESSALON PERLES) 100 MG capsule Take 1 capsule by mouth 3 times daily as needed for Cough Yes LAWRENCE Marie CNP   albuterol sulfate HFA (PROVENTIL;VENTOLIN;PROAIR) 108 (90 Base) MCG/ACT inhaler inhale 1 puff by mouth and INTO THE LUNGS every 4 hours if needed for wheezing Yes LAWRENCE Marie CNP   metFORMIN (GLUCOPHAGE-XR) 500 MG extended release tablet Take 1 tablet by mouth daily (with breakfast) Yes LAWRENCE Marie CNP   baclofen (LIORESAL) 20 MG tablet Take 20 mg by mouth 3 times daily Yes Historical Provider, MD   zoster recombinant adjuvanted vaccine (SHINGRIX) 50 MCG/0.5ML SUSR injection Inject 0.5 mLs into the muscle See Admin Instructions 1 dose now and repeat in 2-6 months Yes LAWRENCE Marie CNP   enalapril (VASOTEC) 10 MG tablet Take 1 tablet by mouth daily Yes LAWRENCE Marie CNP   Blood Gluc Meter Disp-Strips (BLOOD GLUCOSE METER DISPOSABLE) KELLY Daily and as needed Yes LAWRENCE Marie CNP   blood glucose monitor strips 1 strip by Other route daily Test 1 times a day & as needed for symptoms of irregular blood glucose.  Yes LAWRENCE Marie CNP   Lancets MISC 1 each by Does not apply route daily Yes LAWRENCE Marie CNP   VRAYLAR 3 MG CAPS capsule take 1 capsule by mouth every morning Yes Historical Provider, MD   albuterol (PROVENTIL) (2.5 MG/3ML) 0.083% nebulizer solution Take 3 mLs by nebulization every 4 hours as needed for Wheezing Yes LAWRENCE Marie CNP   fluticasone-vilanterol (BREO ELLIPTA) 100-25 MCG/INH AEPB inhaler Inhale 1 puff into the lungs daily Yes Deb Jackson Lindsey Bustos, APRN - CNP   gabapentin (NEURONTIN) 600 MG tablet Take 600 mg by mouth 3 times daily. Yes Historical Provider, MD   venlafaxine (EFFEXOR XR) 150 MG extended release capsule  Yes Historical Provider, MD   ALPRAZolam (XANAX) 0.5 MG tablet Take 0.5 mg by mouth nightly as needed for Sleep.  Yes Historical Provider, MD       Social History     Tobacco Use    Smoking status: Former     Packs/day: 2.00     Years: 20.00     Pack years: 40.00     Types: Cigarettes     Quit date: 1/10/2010     Years since quittin.8    Smokeless tobacco: Never   Vaping Use    Vaping Use: Never used   Substance Use Topics    Alcohol use: Yes     Comment: rare    Drug use: No        Past Medical History:   Diagnosis Date    Acute exacerbation of COPD with asthma ([facility] 16    Anxiety     Asthma     Benign essential HTN     Bipolar 1 disorder (HCC)     COPD (chronic obstructive pulmonary disease) ([facility])     Depression     Fibromyalgia     Headache(784.0)     Hip arthritis 2019    Hyperlipidemia 10/18/2019    Obesity     Pneumonia due to infectious organism 2016    Post traumatic stress disorder         Allergies   Allergen Reactions    Food Anaphylaxis     raisins    Penicillins Anaphylaxis    Parafon Forte Dsc [Chlorzoxazone] Hives    Biaxin [Clarithromycin] Rash   ,   Past Medical History:   Diagnosis Date    Acute exacerbation of COPD with asthma ([facility] 16    Anxiety     Asthma     Benign essential HTN     Bipolar 1 disorder (HCC)     COPD (chronic obstructive pulmonary disease) (HCC)     Depression     Fibromyalgia     Headache(784.0)     Hip arthritis 2019    Hyperlipidemia 10/18/2019    Obesity     Pneumonia due to infectious organism 2016    Post traumatic stress disorder    ,   Past Surgical History:   Procedure Laterality Date    ANESTHESIA NERVE BLOCK N/A 2020    NERVE BLOCK BILATERAL - KNEE performed by Leti York MD at [address] NERV Bilateral 12/5/2019    INJECTION MEDICATION - HIP performed by Irma Tineo MD at [address] Bilateral 01/09/2020    knees     TONSILLECTOMY         CBC:  Lab Results   Component Value Date/Time    WBC 8.5 12/23/2021 02:09 PM    HGB 14.7 12/23/2021 02:09 PM     12/23/2021 02:09 PM       BMP:    Lab Results   Component Value Date/Time     12/23/2021 02:09 PM    K 4.2 12/23/2021 02:09 PM     12/23/2021 02:09 PM    CO2 21 12/23/2021 02:09 PM    BUN 15 12/23/2021 02:09 PM    CREATININE 0.70 12/23/2021 02:09 PM    GLUCOSE 113 05/27/2022 11:55 AM       HEMOGLOBIN A1C:   Lab Results   Component Value Date/Time    LABA1C 6.1 04/21/2022 11:43 AM       FASTING LIPID PANEL:  Lab Results   Component Value Date    CHOL 172 04/11/2019    HDL 52 04/11/2019    TRIG 294 04/11/2019         RECORD REVIEW: Previous medical records were reviewed at today's visit. PHYSICAL EXAMINATION:    Vital Signs: (As obtained by patient/caregiver at home)    Patient-Reported Vitals 11/10/2022   Patient-Reported Weight 365   Patient-Reported Height 57   Patient-Reported Systolic -   Patient-Reported Diastolic -   Patient-Reported Temperature -   Patient-Reported SpO2 -          Physical Exam  Constitutional:       Appearance: Normal appearance. She is obese. She is ill-appearing. She is not toxic-appearing. HENT:      Head: Normocephalic. Right Ear: External ear normal.      Left Ear: External ear normal.      Nose: Nose normal.      Mouth/Throat:      Mouth: Mucous membranes are moist.   Eyes:      General: No scleral icterus. Right eye: No discharge. Left eye: No discharge. Conjunctiva/sclera: Conjunctivae normal.   Pulmonary:      Effort: Pulmonary effort is normal.      Comments: Speaking in full sentences  Musculoskeletal:      Cervical back: Normal range of motion. Skin:     Coloration: Skin is not jaundiced.    Neurological:      Mental Status: She is alert and oriented to person, place, and time. Psychiatric:         Mood and Affect: Mood normal.         Behavior: Behavior normal.         Thought Content: Thought content normal.         RECORD REVIEW: Previous medical records were reviewed at today's visit. The past family, medical and social histories were reviewed and unchanged with the exceptions of what is mentioned in this note. Due to this being a TeleHealth encounter, evaluation of the following organ systems is limited: Vitals/Constitutional/EENT/Resp/CV/GI//MS/Neuro/Skin/Heme-Lymph-Imm. ASSESSMENT/PLAN:  Kaye Davis was seen today for uri. Diagnoses and all orders for this visit:    COPD exacerbation ([facility] [address]  -     azithromycin (ZITHROMAX) 250 MG tablet; Take 1 tablet by mouth See Admin Instructions for 5 days 500mg on day 1 followed by 250mg on days 2 - 5  -     predniSONE (DELTASONE) 20 MG tablet; Take 1 tablet by mouth daily for 5 days    Viral URI with cough  -     azithromycin (ZITHROMAX) 250 MG tablet; Take 1 tablet by mouth See Admin Instructions for 5 days 500mg on day 1 followed by 250mg on days 2 - 5  -     predniSONE (DELTASONE) 20 MG tablet; Take 1 tablet by mouth daily for 5 days  -     benzonatate (TESSALON PERLES) 100 MG capsule; Take 1 capsule by mouth 3 times daily as needed for Cough      Return if symptoms worsen or fail to improve. An  electronic signature was used to authenticate this note. --Starla Roberson, LAWRENCE - CNP on 11/10/2022 at 3:27 PM    This note is created with the assistance of a speech-recognition program. While intending to generate a document that actually reflects the content of the visit, the document can still have some mistakes which may not have been identified and corrected by editing. [address], was evaluated through a synchronous (real-time) audio-video encounter. The patient (or guardian if applicable) is aware that this is a billable service, which includes applicable co-pays.  This Virtual Visit was conducted with patient's (and/or legal guardian's) consent. The visit was conducted pursuant to the emergency declaration under the [address] [place] [address] authority and the [employer] and [employer] General Act. Patient identification was verified, and a caregiver was present when appropriate. The patient was located in a state where the provider was licensed to provide care. Services were provided through a video synchronous discussion virtually to substitute for in-person clinic visit.

## 2022-11-10 NOTE — PROGRESS NOTES
Visit Information    Have you changed or started any medications since your last visit including any over-the-counter medicines, vitamins, or herbal medicines? no   Are you having any side effects from any of your medications? -  no  Have you stopped taking any of your medications? Is so, why? -  no    Have you seen any other physician or provider since your last visit? No  Have you had any other diagnostic tests since your last visit? No  Have you been seen in the emergency room and/or had an admission to a hospital since we last saw you? No  Have you had your routine dental cleaning in the past 6 months? no    Have you activated your [email or web address] account? If not, what are your barriers?  Yes     Patient Care Team:  LAWRENCE Flowers CNP as PCP - General (Family Medicine)  LAWRENCE Flowers CNP as PCP - [facility] Provider  Jim Pacheco MD as Consulting Physician (Gastroenterology)    Medical History Review  Past Medical, Family, and Social History reviewed and does not contribute to the patient presenting condition    Health Maintenance   Topic Date Due    HIV screen  Never done    Hepatitis C screen  Never done    Cervical cancer screen  Never done    Colorectal Cancer Screen  Never done    Shingles vaccine (1 of 2) Never done    Low dose CT lung screening  Never done    Annual Wellness Visit (AWV)  Never done    COVID-19 Vaccine (4 - Booster for Wilkinson Peter series) 09/12/2022    Depression Monitoring  04/22/2023    A1C test (Diabetic or Prediabetic)  08/05/2023    Lipids  04/11/2024    Breast cancer screen  09/27/2024    DTaP/Tdap/Td vaccine (2 - Td or Tdap) 08/18/2027    Flu vaccine  Completed    Pneumococcal 0-64 years Vaccine  Completed    Hepatitis A vaccine  Aged Out    Hib vaccine  Aged Out    Meningococcal (ACWY) vaccine  Aged Out

## 2022-11-29 DIAGNOSIS — J44.9 CHRONIC OBSTRUCTIVE PULMONARY DISEASE, UNSPECIFIED COPD TYPE (HCC): ICD-10-CM

## 2022-11-29 NOTE — TELEPHONE ENCOUNTER
Health Maintenance   Topic Date Due    HIV screen  Never done    Hepatitis C screen  Never done    Cervical cancer screen  Never done    Colorectal Cancer Screen  Never done    Shingles vaccine (1 of 2) Never done    Low dose CT lung screening  Never done    Annual Wellness Visit (AWV)  Never done    COVID-19 Vaccine (4 - Booster for Pfizer series) 09/12/2022    Depression Monitoring  04/22/2023    A1C test (Diabetic or Prediabetic)  08/05/2023    Lipids  04/11/2024    Breast cancer screen  09/27/2024    DTaP/Tdap/Td vaccine (2 - Td or Tdap) 08/18/2027    Flu vaccine  Completed    Pneumococcal 0-64 years Vaccine  Completed    Hepatitis A vaccine  Aged Out    Hib vaccine  Aged Out    Meningococcal (ACWY) vaccine  Aged Out             (applicable per patient's age: Cancer Screenings, Depression Screening, Fall Risk Screening, Immunizations)    Hemoglobin A1C (%)   Date Value   04/21/2022 6.1 (H)   08/06/2018 5.2   10/24/2014 5.4     LDL Cholesterol (mg/dL)   Date Value   05/02/2013 111 (H)     LDL Calculated (mg/dL)   Date Value   04/11/2019 61     AST (U/L)   Date Value   06/29/2021 44 (H)     ALT (U/L)   Date Value   06/29/2021 30     BUN (mg/dL)   Date Value   12/23/2021 15      (goal A1C is < 7)   (goal LDL is <100) need 30-50% reduction from baseline     BP Readings from Last 3 Encounters:   09/29/22 131/82   06/01/22 118/77   04/22/22 (!) 163/73    (goal /80)      All Future Testing planned in [facility]:  Lab Frequency Next Occurrence   Hemoglobin A1C Once 08/03/2022       Next Visit Date:  Future Appointments   Date Time Provider Natalie Goldman   1/30/2023 12:45 PM Keesha Weaver, APRN -  [facility]            Patient Active Problem List:     Chronic obstructive pulmonary disease, unspecified ([facility]     Acute exacerbation of chronic obstructive pulmonary disease (COPD) ([facility]     Multiple nodules of lung     Morbid obesity with body mass index (BMI) of 50.0 to 59.9 in adult [facility]) Leukocytosis     Sleep apnea     Bipolar 1 disorder (HCC)     Anxiety     COPD exacerbation (HCC)     Benign essential HTN     Major depression     Abnormal TSH     Chronic low back pain     Fibromyalgia     Hip pain     Hip pain, bilateral     Chronic pain of both knees     Hyperlipidemia     Family history of diseases of the ms sys and connective tiss     Lumbar stenosis with neurogenic claudication     Migraines     Personal history of nicotine dependence     Primary osteoarthritis of both hips     Primary osteoarthritis of both knees     Seasonal allergies     COPD (chronic obstructive pulmonary disease) ([facility]

## 2022-11-30 RX ORDER — FLUTICASONE FUROATE AND VILANTEROL 100; 25 UG/1; UG/1
POWDER RESPIRATORY (INHALATION)
Qty: 1 EACH | Refills: 3 | Status: SHIPPED | OUTPATIENT
Start: 2022-11-30

## 2023-01-25 ENCOUNTER — TELEMEDICINE (OUTPATIENT)
Dept: PRIMARY CARE CLINIC | Age: 53
End: 2023-01-25

## 2023-01-25 DIAGNOSIS — J44.1 COPD EXACERBATION (HCC): ICD-10-CM

## 2023-01-25 RX ORDER — PREDNISONE 20 MG/1
20 TABLET ORAL DAILY
Qty: 5 TABLET | Refills: 0 | Status: SHIPPED | OUTPATIENT
Start: 2023-01-25 | End: 2023-01-30

## 2023-01-25 RX ORDER — AZITHROMYCIN 250 MG/1
250 TABLET, FILM COATED ORAL SEE ADMIN INSTRUCTIONS
Qty: 6 TABLET | Refills: 0 | Status: SHIPPED | OUTPATIENT
Start: 2023-01-25 | End: 2023-01-30

## 2023-01-25 ASSESSMENT — ENCOUNTER SYMPTOMS
SHORTNESS OF BREATH: 0
WHEEZING: 1
COUGH: 0

## 2023-01-25 NOTE — PROGRESS NOTES
Gissel Morgan (:  1970) is a Established patient, here for evaluation of the following:    Assessment & Plan   Below is the assessment and plan developed based on review of pertinent history, physical exam, labs, studies, and medications. 1. COPD exacerbation (HCC)  -     predniSONE (DELTASONE) 20 MG tablet; Take 1 tablet by mouth daily for 5 days, Disp-5 tablet, R-0Normal  -     azithromycin (ZITHROMAX) 250 MG tablet; Take 1 tablet by mouth See Admin Instructions for 5 days 500mg on day 1 followed by 250mg on days 2 - 5, Disp-6 tablet, R-0Normal  No follow-ups on file. Subjective   Otalgia   Pertinent negatives include no coughing. Other  Pertinent negatives include no chest pain, chills, coughing or fever. Wadie Nurse states she has a bilateral \"ear infection\". She states she has sharp stabbing pain and her lymph nodes are swollen. She is getting congestion in her chest.  She has copd. She states she is wheezing. She no longer smokes. No fever. She has some sinus drainage. She is using her albuterol every four hours- trying to get the phlegm up. It helps some. States she has used azithromycin in the past for ear aches. Review of Systems   Constitutional:  Negative for chills and fever. HENT:  Positive for ear pain. Respiratory:  Positive for wheezing. Negative for cough and shortness of breath. Cardiovascular:  Negative for chest pain, palpitations and leg swelling.         Objective   Patient-Reported Vitals  No data recorded       [INSTRUCTIONS:  \"[x]\" Indicates a positive item  \"[]\" Indicates a negative item  -- DELETE ALL ITEMS NOT EXAMINED]    Constitutional: [x] Appears well-developed and well-nourished [x] No apparent distress      [] Abnormal -     Mental status: [x] Alert and awake  [x] Oriented to person/place/time [x] Able to follow commands    [] Abnormal -     Eyes:   EOM    [x]  Normal    [] Abnormal -   Sclera  [x]  Normal    [] Abnormal -          Discharge [x]  None visible   [] Abnormal -     HENT: [x] Normocephalic, atraumatic  [] Abnormal -   [x] Mouth/Throat: Mucous membranes are moist    External Ears [x] Normal  [] Abnormal -    Neck: [x] No visualized mass [] Abnormal -     Pulmonary/Chest: [x] Respiratory effort normal   [x] No visualized signs of difficulty breathing or respiratory distress        [] Abnormal -      Musculoskeletal:   [x] Normal gait with no signs of ataxia         [x] Normal range of motion of neck        [] Abnormal -     Neurological:        [x] No Facial Asymmetry (Cranial nerve 7 motor function) (limited exam due to video visit)          [x] No gaze palsy        [] Abnormal -          Skin:        [x] No significant exanthematous lesions or discoloration noted on facial skin         [] Abnormal -            Psychiatric:       [x] Normal Affect [] Abnormal -        [x] No Hallucinations    Other pertinent observable physical exam findings:-         Molly Hardy, was evaluated through a synchronous (real-time) audio-video encounter. The patient (or guardian if applicable) is aware that this is a billable service, which includes applicable co-pays. This Virtual Visit was conducted with patient's (and/or legal guardian's) consent. The visit was conducted pursuant to the emergency declaration under the [address] [place] [address] authority and the [employer] and [employer] General Act. Patient identification was verified, and a caregiver was present when appropriate. The patient was located at Home: [address]  [address] [address].    [facility] LAWRENCE Curran - PRATIBHA

## 2023-03-28 DIAGNOSIS — R73.03 PREDIABETES: ICD-10-CM

## 2023-03-29 RX ORDER — METFORMIN HYDROCHLORIDE 500 MG/1
500 TABLET, EXTENDED RELEASE ORAL
Qty: 90 TABLET | Refills: 1 | Status: SHIPPED | OUTPATIENT
Start: 2023-03-29

## 2023-04-04 DIAGNOSIS — J44.9 CHRONIC OBSTRUCTIVE PULMONARY DISEASE, UNSPECIFIED COPD TYPE (HCC): ICD-10-CM

## 2023-04-04 DIAGNOSIS — R73.03 PREDIABETES: ICD-10-CM

## 2023-04-04 RX ORDER — FLUTICASONE FUROATE AND VILANTEROL 100; 25 UG/1; UG/1
1 POWDER RESPIRATORY (INHALATION) DAILY
Qty: 1 EACH | Refills: 3 | Status: SHIPPED | OUTPATIENT
Start: 2023-04-04

## 2023-04-04 RX ORDER — GLUCOSAMINE HCL/CHONDROITIN SU 500-400 MG
1 CAPSULE ORAL DAILY
Qty: 300 STRIP | Refills: 0 | Status: SHIPPED | OUTPATIENT
Start: 2023-04-04 | End: 2023-05-04

## 2023-04-04 RX ORDER — ALBUTEROL SULFATE 90 UG/1
AEROSOL, METERED RESPIRATORY (INHALATION)
Qty: 3 EACH | Refills: 1 | Status: SHIPPED | OUTPATIENT
Start: 2023-04-04 | End: 2023-08-22

## 2023-05-03 SDOH — ECONOMIC STABILITY: FOOD INSECURITY: WITHIN THE PAST 12 MONTHS, YOU WORRIED THAT YOUR FOOD WOULD RUN OUT BEFORE YOU GOT MONEY TO BUY MORE.: SOMETIMES TRUE

## 2023-05-03 SDOH — ECONOMIC STABILITY: FOOD INSECURITY: WITHIN THE PAST 12 MONTHS, THE FOOD YOU BOUGHT JUST DIDN'T LAST AND YOU DIDN'T HAVE MONEY TO GET MORE.: SOMETIMES TRUE

## 2023-05-03 SDOH — ECONOMIC STABILITY: TRANSPORTATION INSECURITY
IN THE PAST 12 MONTHS, HAS LACK OF TRANSPORTATION KEPT YOU FROM MEETINGS, WORK, OR FROM GETTING THINGS NEEDED FOR DAILY LIVING?: NO

## 2023-05-03 SDOH — ECONOMIC STABILITY: INCOME INSECURITY: HOW HARD IS IT FOR YOU TO PAY FOR THE VERY BASICS LIKE FOOD, HOUSING, MEDICAL CARE, AND HEATING?: SOMEWHAT HARD

## 2023-05-03 SDOH — ECONOMIC STABILITY: HOUSING INSECURITY
IN THE LAST 12 MONTHS, WAS THERE A TIME WHEN YOU DID NOT HAVE A STEADY PLACE TO SLEEP OR SLEPT IN A SHELTER (INCLUDING NOW)?: NO

## 2023-05-05 ENCOUNTER — TELEMEDICINE (OUTPATIENT)
Dept: PRIMARY CARE CLINIC | Age: 53
End: 2023-05-05

## 2023-05-05 DIAGNOSIS — R73.03 PREDIABETES: ICD-10-CM

## 2023-05-05 DIAGNOSIS — J44.1 COPD EXACERBATION (HCC): Primary | ICD-10-CM

## 2023-05-05 DIAGNOSIS — I10 HYPERTENSION, UNSPECIFIED TYPE: ICD-10-CM

## 2023-05-05 DIAGNOSIS — J45.40 MODERATE PERSISTENT ASTHMA WITHOUT COMPLICATION: ICD-10-CM

## 2023-05-05 DIAGNOSIS — Z87.891 PERSONAL HISTORY OF TOBACCO USE: ICD-10-CM

## 2023-05-05 RX ORDER — PREDNISONE 20 MG/1
20 TABLET ORAL DAILY
Qty: 5 TABLET | Refills: 0 | Status: SHIPPED | OUTPATIENT
Start: 2023-05-05 | End: 2023-05-10

## 2023-05-05 RX ORDER — LANCETS 30 GAUGE
1 EACH MISCELLANEOUS DAILY
Qty: 300 EACH | Refills: 5 | Status: SHIPPED | OUTPATIENT
Start: 2023-05-05 | End: 2023-06-04

## 2023-05-05 RX ORDER — ENALAPRIL MALEATE 10 MG/1
10 TABLET ORAL DAILY
Qty: 100 TABLET | Refills: 1 | Status: SHIPPED | OUTPATIENT
Start: 2023-05-05

## 2023-05-05 RX ORDER — ALBUTEROL SULFATE 2.5 MG/3ML
2.5 SOLUTION RESPIRATORY (INHALATION) EVERY 4 HOURS PRN
Qty: 120 EACH | Refills: 3 | Status: SHIPPED | OUTPATIENT
Start: 2023-05-05

## 2023-05-05 ASSESSMENT — PATIENT HEALTH QUESTIONNAIRE - PHQ9
3. TROUBLE FALLING OR STAYING ASLEEP: 1
8. MOVING OR SPEAKING SO SLOWLY THAT OTHER PEOPLE COULD HAVE NOTICED. OR THE OPPOSITE, BEING SO FIGETY OR RESTLESS THAT YOU HAVE BEEN MOVING AROUND A LOT MORE THAN USUAL: 0
9. THOUGHTS THAT YOU WOULD BE BETTER OFF DEAD, OR OF HURTING YOURSELF: 0
1. LITTLE INTEREST OR PLEASURE IN DOING THINGS: 0
10. IF YOU CHECKED OFF ANY PROBLEMS, HOW DIFFICULT HAVE THESE PROBLEMS MADE IT FOR YOU TO DO YOUR WORK, TAKE CARE OF THINGS AT HOME, OR GET ALONG WITH OTHER PEOPLE: 1
SUM OF ALL RESPONSES TO PHQ QUESTIONS 1-9: 3
5. POOR APPETITE OR OVEREATING: 0
7. TROUBLE CONCENTRATING ON THINGS, SUCH AS READING THE NEWSPAPER OR WATCHING TELEVISION: 0
SUM OF ALL RESPONSES TO PHQ QUESTIONS 1-9: 3
4. FEELING TIRED OR HAVING LITTLE ENERGY: 1
SUM OF ALL RESPONSES TO PHQ QUESTIONS 1-9: 3
SUM OF ALL RESPONSES TO PHQ QUESTIONS 1-9: 3
SUM OF ALL RESPONSES TO PHQ9 QUESTIONS 1 & 2: 1
2. FEELING DOWN, DEPRESSED OR HOPELESS: 1
6. FEELING BAD ABOUT YOURSELF - OR THAT YOU ARE A FAILURE OR HAVE LET YOURSELF OR YOUR FAMILY DOWN: 0

## 2023-05-05 ASSESSMENT — ENCOUNTER SYMPTOMS
WHEEZING: 1
SHORTNESS OF BREATH: 1
COUGH: 1

## 2023-05-05 NOTE — PROGRESS NOTES
Cornelius Taylor is a 46 y.o. female evaluated virtual visit via [address] on 2023. Consent:  She and/or health care decision maker is aware that that she may receive a bill for this telephone service, depending on her insurance coverage, and has provided verbal consent to proceed: Yes      Documentation:  I communicated with the patient and/or health care decision maker about COPD, Asthma. Details of this discussion including any medical advice provided below      I affirm this is a Patient Initiated Episode with an Established Patient who has not had a related appointment within my department in the past 7 days or scheduled within the next 24 hours. Total Time: minutes: 11-20 minutes    Note: not billable if this call serves to triage the patient into an appointment for the relevant concern      LAWRENCE Han CNP                  2023    TELEHEALTH EVALUATION -- Audio/Visual (During PIMRZ-54 public health emergency)    Patient and physician are located in their individual homes    HPI:    Cornelius Taylor (:  1970) has requested an audio only/video evaluation for the following concern(s):      More mobile since surgery, not needing scooter to shop at grocery store. Also goes to the gym and does treadmill along with upper body exercises. Reid Rodriguez is worried about her breathing, states that she has had increased mucus production that is thick and white. Using albuterol more frequently and feeling in general more short of breath and wheezy. Has tried Mucinex DM to loosen the phlegm. Sugars have increased slightly over the last few days with a fasting blood sugar 140 this morning. Asthma  She complains of cough, shortness of breath and wheezing. Pertinent negatives include no chest pain, ear pain or fever. Her past medical history is significant for asthma. Review of Systems   Constitutional:  Negative for chills and fever. HENT:  Negative for ear pain.     Respiratory:

## 2023-05-15 ENCOUNTER — PATIENT MESSAGE (OUTPATIENT)
Dept: PRIMARY CARE CLINIC | Age: 53
End: 2023-05-15

## 2023-05-15 DIAGNOSIS — J44.1 COPD EXACERBATION (HCC): Primary | ICD-10-CM

## 2023-05-15 RX ORDER — METHYLPREDNISOLONE 4 MG/1
TABLET ORAL
Qty: 1 KIT | Refills: 0 | Status: SHIPPED | OUTPATIENT
Start: 2023-05-15 | End: 2023-05-21

## 2023-05-15 RX ORDER — AZITHROMYCIN 250 MG/1
250 TABLET, FILM COATED ORAL SEE ADMIN INSTRUCTIONS
Qty: 6 TABLET | Refills: 0 | Status: SHIPPED | OUTPATIENT
Start: 2023-05-15 | End: 2023-05-20

## 2023-05-15 NOTE — TELEPHONE ENCOUNTER
From: Denis Norman  To: Armando Pham  Sent: 5/15/2023 9:13 AM EDT  Subject: Chest congestion     I saw you on May 5th for chest congestion. I was prescribed steroids. I woke up this morning with the chest congestion, phlegm & wheezing. Can I possibly have a z-pack and a medrol dose pack of steroids?

## 2023-06-01 DIAGNOSIS — J44.9 CHRONIC OBSTRUCTIVE PULMONARY DISEASE, UNSPECIFIED COPD TYPE (HCC): ICD-10-CM

## 2023-06-01 RX ORDER — ALBUTEROL SULFATE 90 UG/1
AEROSOL, METERED RESPIRATORY (INHALATION)
Qty: 18 G | Refills: 1 | Status: SHIPPED | OUTPATIENT
Start: 2023-06-01 | End: 2023-07-11 | Stop reason: SDUPTHER

## 2023-07-11 DIAGNOSIS — J44.9 CHRONIC OBSTRUCTIVE PULMONARY DISEASE, UNSPECIFIED COPD TYPE (HCC): ICD-10-CM

## 2023-07-11 DIAGNOSIS — R73.03 PREDIABETES: ICD-10-CM

## 2023-07-11 RX ORDER — FLUTICASONE FUROATE AND VILANTEROL 100; 25 UG/1; UG/1
1 POWDER RESPIRATORY (INHALATION) DAILY
Qty: 3 EACH | Refills: 3 | Status: SHIPPED | OUTPATIENT
Start: 2023-07-11

## 2023-07-11 RX ORDER — FLUTICASONE FUROATE AND VILANTEROL TRIFENATATE 100; 25 UG/1; UG/1
POWDER RESPIRATORY (INHALATION)
OUTPATIENT
Start: 2023-07-11

## 2023-07-11 RX ORDER — METFORMIN HYDROCHLORIDE 500 MG/1
500 TABLET, EXTENDED RELEASE ORAL
Qty: 90 TABLET | Refills: 1 | Status: SHIPPED | OUTPATIENT
Start: 2023-07-11

## 2023-07-11 RX ORDER — ALBUTEROL SULFATE 90 UG/1
AEROSOL, METERED RESPIRATORY (INHALATION)
Qty: 18 G | Refills: 1 | OUTPATIENT
Start: 2023-07-11

## 2023-07-11 RX ORDER — ALBUTEROL SULFATE 90 UG/1
2 AEROSOL, METERED RESPIRATORY (INHALATION) EVERY 4 HOURS PRN
Qty: 3 EACH | Refills: 1 | Status: SHIPPED | OUTPATIENT
Start: 2023-07-11

## 2023-08-22 ENCOUNTER — TELEPHONE (OUTPATIENT)
Dept: ONCOLOGY | Age: 53
End: 2023-08-22

## 2023-09-07 ENCOUNTER — TELEMEDICINE (OUTPATIENT)
Dept: PRIMARY CARE CLINIC | Age: 53
End: 2023-09-07
Payer: MEDICARE

## 2023-09-07 DIAGNOSIS — J32.0 MAXILLARY SINUSITIS, UNSPECIFIED CHRONICITY: ICD-10-CM

## 2023-09-07 DIAGNOSIS — J44.1 COPD EXACERBATION (HCC): Primary | ICD-10-CM

## 2023-09-07 PROCEDURE — 99213 OFFICE O/P EST LOW 20 MIN: CPT | Performed by: NURSE PRACTITIONER

## 2023-09-07 RX ORDER — DOXYCYCLINE HYCLATE 100 MG
100 TABLET ORAL 2 TIMES DAILY
Qty: 14 TABLET | Refills: 0 | Status: SHIPPED | OUTPATIENT
Start: 2023-09-07 | End: 2023-09-14

## 2023-09-07 RX ORDER — PREDNISONE 20 MG/1
20 TABLET ORAL DAILY
Qty: 5 TABLET | Refills: 0 | Status: SHIPPED | OUTPATIENT
Start: 2023-09-07 | End: 2023-09-12

## 2023-09-07 ASSESSMENT — ENCOUNTER SYMPTOMS
COUGH: 1
WHEEZING: 1
SINUS PAIN: 1

## 2023-09-07 NOTE — PROGRESS NOTES
encounter, evaluation of the following organ systems is limited: Vitals/Constitutional/EENT/Resp/CV/GI//MS/Neuro/Skin/Heme-Lymph-Imm. ASSESSMENT/PLAN:  Eliana Alvarado was seen today for uri. Diagnoses and all orders for this visit:    COPD exacerbation ([address])    Maxillary sinusitis, unspecified chronicity  -     doxycycline hyclate (VIBRA-TABS) 100 MG tablet; Take 1 tablet by mouth 2 times daily for 7 days  -     predniSONE (DELTASONE) 20 MG tablet; Take 1 tablet by mouth daily for 5 days        No follow-ups on file. An  electronic signature was used to authenticate this note. --LAWRENCE Batres - CNP on 9/7/2023 at 2:44 PM    This note is created with the assistance of a speech-recognition program. While intending to generate a document that actually reflects the content of the visit, the document can still have some mistakes which may not have been identified and corrected by editing. [address], was evaluated through a synchronous (real-time) audio-video encounter. The patient (or guardian if applicable) is aware that this is a billable service, which includes applicable co-pays. This Virtual Visit was conducted with patient's (and/or legal guardian's) consent. The visit was conducted pursuant to the emergency declaration under the [address] [facility] authority and the [employer] and [employer] Act. Patient identification was verified, and a caregiver was present when appropriate. The patient was located in a state where the provider was licensed to provide care. Services were provided through a video synchronous discussion virtually to substitute for in-person clinic visit.

## 2023-10-03 ENCOUNTER — HOSPITAL ENCOUNTER (OUTPATIENT)
Dept: GENERAL RADIOLOGY | Age: 53
Discharge: HOME | End: 2023-10-05
Payer: MEDICARE

## 2023-10-03 ENCOUNTER — OFFICE VISIT (OUTPATIENT)
Dept: PRIMARY CARE CLINIC | Age: 53
End: 2023-10-03
Payer: MEDICARE

## 2023-10-03 ENCOUNTER — HOSPITAL ENCOUNTER (OUTPATIENT)
Age: 53
Discharge: HOME | End: 2023-10-05
Payer: MEDICARE

## 2023-10-03 VITALS
HEART RATE: 107 BPM | DIASTOLIC BLOOD PRESSURE: 87 MMHG | HEIGHT: 67 IN | OXYGEN SATURATION: 95 % | BODY MASS INDEX: 45.99 KG/M2 | SYSTOLIC BLOOD PRESSURE: 129 MMHG | WEIGHT: 293 LBS

## 2023-10-03 DIAGNOSIS — R10.10 UPPER ABDOMINAL PAIN: Primary | ICD-10-CM

## 2023-10-03 DIAGNOSIS — R73.03 PREDIABETES: ICD-10-CM

## 2023-10-03 DIAGNOSIS — R63.0 ANOREXIA: ICD-10-CM

## 2023-10-03 DIAGNOSIS — R10.10 UPPER ABDOMINAL PAIN: ICD-10-CM

## 2023-10-03 PROBLEM — J45.40 MODERATE PERSISTENT ASTHMA: Status: ACTIVE | Noted: 2022-09-01

## 2023-10-03 PROBLEM — E11.9 TYPE 2 DIABETES MELLITUS WITHOUT COMPLICATIONS (HCC): Status: ACTIVE | Noted: 2022-09-01

## 2023-10-03 PROBLEM — R26.9 UNSPECIFIED ABNORMALITIES OF GAIT AND MOBILITY: Status: ACTIVE | Noted: 2022-09-12

## 2023-10-03 PROBLEM — K21.9 GASTRO-ESOPHAGEAL REFLUX DISEASE WITHOUT ESOPHAGITIS: Status: ACTIVE | Noted: 2022-09-01

## 2023-10-03 PROBLEM — Z79.84 LONG TERM (CURRENT) USE OF ORAL HYPOGLYCEMIC DRUGS: Status: ACTIVE | Noted: 2022-09-01

## 2023-10-03 PROBLEM — Z79.01 LONG TERM (CURRENT) USE OF ANTICOAGULANTS: Status: ACTIVE | Noted: 2022-09-01

## 2023-10-03 PROBLEM — Z47.1 AFTERCARE FOLLOWING JOINT REPLACEMENT SURGERY: Status: ACTIVE | Noted: 2022-08-19

## 2023-10-03 LAB — HBA1C MFR BLD: 6.1 %

## 2023-10-03 PROCEDURE — 99213 OFFICE O/P EST LOW 20 MIN: CPT | Performed by: NURSE PRACTITIONER

## 2023-10-03 PROCEDURE — 3079F DIAST BP 80-89 MM HG: CPT | Performed by: NURSE PRACTITIONER

## 2023-10-03 PROCEDURE — 74018 RADEX ABDOMEN 1 VIEW: CPT

## 2023-10-03 PROCEDURE — 83036 HEMOGLOBIN GLYCOSYLATED A1C: CPT | Performed by: NURSE PRACTITIONER

## 2023-10-03 PROCEDURE — 3074F SYST BP LT 130 MM HG: CPT | Performed by: NURSE PRACTITIONER

## 2023-10-03 ASSESSMENT — ENCOUNTER SYMPTOMS
CONSTIPATION: 0
ABDOMINAL PAIN: 1
NAUSEA: 1
VOMITING: 1
FLATUS: 1
DIARRHEA: 0

## 2023-10-03 NOTE — PROGRESS NOTES
[address] [facility]  [address] [address] [facility] [address]  Dept: [phone]  Dept Fax: [phone]    Rosita Weaver (:  1970) is a 48 y.o. female,Established patient, here for evaluation of the following chief complaint(s):  Abdominal Pain (Patient is here today for abdominal pain )         ASSESSMENT/PLAN:  1. Upper abdominal pain  -     XR ABDOMEN (KUB) (SINGLE AP VIEW); Future  -     H. Pylori Breath Test, Adult; Future  2. Anorexia  -     XR ABDOMEN (KUB) (SINGLE AP VIEW); Future  -     H. Pylori Breath Test, Adult; Future  3. Prediabetes  -     POCT glycosylated hemoglobin (Hb A1C)    History of cholecystectomy. We will start with KUB to evaluate for any constipation or obstruction. Appreciate H. pylori breath test to rule out infectious process. CT abdomen and pelvis for follow-up if initial testing negative or inconclusive. No follow-ups on file. Subjective   SUBJECTIVE/OBJECTIVE:  Abdominal Pain  This is a new problem. The current episode started 1 to 4 weeks ago. The problem has been waxing and waning. The pain is located in the LUQ, RUQ and periumbilical region. The abdominal pain does not radiate. Associated symptoms include flatus, nausea and vomiting. Pertinent negatives include no constipation, diarrhea, dysuria, fever or hematuria. The pain is aggravated by eating. Review of Systems   Constitutional:  Negative for fever. Gastrointestinal:  Positive for abdominal pain, flatus, nausea and vomiting. Negative for constipation and diarrhea. Genitourinary:  Negative for dysuria and hematuria.           Objective     DIAGNOSTIC FINDINGS:  CBC:  Lab Results   Component Value Date/Time    WBC 8.5 2021 02:09 PM    HGB 14.7 2021 02:09 PM     2021 02:09 PM       BMP:    Lab Results   Component Value Date/Time     2021 02:09 PM    K 4.2 2021 02:09 PM

## 2023-10-09 ENCOUNTER — TELEPHONE (OUTPATIENT)
Dept: PRIMARY CARE CLINIC | Age: 53
End: 2023-10-09

## 2023-10-09 NOTE — TELEPHONE ENCOUNTER
----- Message from LAWRENCE Richardson CNP sent at 10/9/2023  1:48 PM EDT -----  Regarding: FW: Pylori breath test  Contact: [phone]        ----- Message -----  From: Guerrero Panda MA  Sent: 10/9/2023   1:47 PM EDT  To: LAWRENCE Richardson CNP  Subject: FW: Pylori breath test                             ----- Message -----  From: Kika Colmenares  Sent: 10/9/2023  11:31 AM EDT  To: [id number] Clinical Staff  Subject: Pylori breath test                               I am unable to get the Pylori breath test done. The order was stamped with central scheduling number for me to call. Central scheduling has no way to schedule this. Out patient registration won't process the request for the test since I am not on a schedule. I am having more pain and throwing up more. What do I do to get this test done?

## 2023-10-17 DIAGNOSIS — J44.9 CHRONIC OBSTRUCTIVE PULMONARY DISEASE, UNSPECIFIED COPD TYPE (HCC): ICD-10-CM

## 2023-10-17 RX ORDER — ALBUTEROL SULFATE 90 UG/1
AEROSOL, METERED RESPIRATORY (INHALATION)
Qty: 8.5 G | Refills: 2 | Status: SHIPPED | OUTPATIENT
Start: 2023-10-17

## 2023-10-26 ENCOUNTER — TELEMEDICINE (OUTPATIENT)
Dept: PRIMARY CARE CLINIC | Age: 53
End: 2023-10-26
Payer: MEDICARE

## 2023-10-26 DIAGNOSIS — J32.0 MAXILLARY SINUSITIS, UNSPECIFIED CHRONICITY: ICD-10-CM

## 2023-10-26 DIAGNOSIS — G47.33 OBSTRUCTIVE SLEEP APNEA SYNDROME: ICD-10-CM

## 2023-10-26 DIAGNOSIS — J44.9 CHRONIC OBSTRUCTIVE PULMONARY DISEASE, UNSPECIFIED COPD TYPE (HCC): ICD-10-CM

## 2023-10-26 DIAGNOSIS — J44.1 COPD EXACERBATION (HCC): Primary | ICD-10-CM

## 2023-10-26 DIAGNOSIS — J45.40 MODERATE PERSISTENT ASTHMA WITHOUT COMPLICATION: ICD-10-CM

## 2023-10-26 PROCEDURE — 99214 OFFICE O/P EST MOD 30 MIN: CPT | Performed by: NURSE PRACTITIONER

## 2023-10-26 RX ORDER — DOXYCYCLINE HYCLATE 100 MG
100 TABLET ORAL 2 TIMES DAILY
Qty: 14 TABLET | Refills: 0 | Status: SHIPPED | OUTPATIENT
Start: 2023-10-26 | End: 2023-11-02

## 2023-10-26 RX ORDER — FLUTICASONE FUROATE, UMECLIDINIUM BROMIDE AND VILANTEROL TRIFENATATE 200; 62.5; 25 UG/1; UG/1; UG/1
1 POWDER RESPIRATORY (INHALATION) DAILY
Qty: 1 EACH | Refills: 2 | Status: SHIPPED | OUTPATIENT
Start: 2023-10-26

## 2023-10-26 RX ORDER — METHYLPREDNISOLONE 4 MG/1
TABLET ORAL
Qty: 1 KIT | Refills: 0 | Status: SHIPPED | OUTPATIENT
Start: 2023-10-26 | End: 2023-11-01

## 2023-10-26 ASSESSMENT — ENCOUNTER SYMPTOMS
COUGH: 1
SHORTNESS OF BREATH: 1
CHEST TIGHTNESS: 1
HEMOPTYSIS: 0
SPUTUM PRODUCTION: 1

## 2023-10-26 ASSESSMENT — COPD QUESTIONNAIRES: COPD: 1

## 2023-10-26 NOTE — PATIENT INSTRUCTIONS
[address] [facility], [address], [address]   [phone]

## 2023-10-26 NOTE — PROGRESS NOTES
a [facility] where the provider was licensed to provide care. Services were provided through a video synchronous discussion virtually to substitute for in-person clinic visit.

## 2023-11-10 ENCOUNTER — TELEPHONE (OUTPATIENT)
Dept: PRIMARY CARE CLINIC | Age: 53
End: 2023-11-10

## 2023-11-10 DIAGNOSIS — J45.40 MODERATE PERSISTENT ASTHMA WITHOUT COMPLICATION: ICD-10-CM

## 2023-11-10 DIAGNOSIS — J44.9 CHRONIC OBSTRUCTIVE PULMONARY DISEASE, UNSPECIFIED COPD TYPE (HCC): Primary | ICD-10-CM

## 2023-11-10 NOTE — TELEPHONE ENCOUNTER
Orders placed for chest x-ray and pulmonary function testing.   Please advise Azeb Lagunas of the needed testing so that she can schedule with pulmonology

## 2023-11-10 NOTE — TELEPHONE ENCOUNTER
Pulmonologist office called stating that they received patient's referral; they spoke to patient regarding being seen. They informed patient that she would need to have a chest x-ray and a pulmonary function test done to give the doctors something to go off of and patient did not like her explanation. Pulmonologist office is requesting to have an order for the patient to get an x-ray and pulmonary function.

## 2023-11-18 DIAGNOSIS — I10 HYPERTENSION, UNSPECIFIED TYPE: ICD-10-CM

## 2023-11-20 RX ORDER — ENALAPRIL MALEATE 10 MG/1
10 TABLET ORAL DAILY
Qty: 100 TABLET | Refills: 1 | Status: SHIPPED | OUTPATIENT
Start: 2023-11-20

## 2023-11-28 ENCOUNTER — HOSPITAL ENCOUNTER (OUTPATIENT)
Dept: GENERAL RADIOLOGY | Age: 53
Discharge: HOME | End: 2023-11-30
Payer: MEDICARE

## 2023-11-28 ENCOUNTER — HOSPITAL ENCOUNTER (OUTPATIENT)
Dept: PULMONOLOGY | Age: 53
Discharge: HOME | End: 2023-11-28
Payer: MEDICARE

## 2023-11-28 ENCOUNTER — HOSPITAL ENCOUNTER (OUTPATIENT)
Age: 53
Discharge: HOME | End: 2023-11-30
Payer: MEDICARE

## 2023-11-28 DIAGNOSIS — J44.9 CHRONIC OBSTRUCTIVE PULMONARY DISEASE, UNSPECIFIED COPD TYPE (HCC): ICD-10-CM

## 2023-11-28 DIAGNOSIS — J45.40 MODERATE PERSISTENT ASTHMA WITHOUT COMPLICATION: ICD-10-CM

## 2023-11-28 LAB
DLCO %PRED: NORMAL
DLCO PRED: NORMAL
DLCO/VA %PRED: NORMAL
DLCO/VA PRED: NORMAL
DLCO/VA: NORMAL
DLCO: NORMAL
EXPIRATORY TIME-POST: NORMAL
EXPIRATORY TIME: NORMAL
FEF 25-75% %CHNG: NORMAL
FEF 25-75% %PRED-POST: NORMAL
FEF 25-75% %PRED-PRE: NORMAL
FEF 25-75% PRED: NORMAL
FEF 25-75%-POST: NORMAL
FEF 25-75%-PRE: NORMAL
FEV1 %PRED-POST: NORMAL
FEV1 %PRED-PRE: NORMAL
FEV1 PRED: NORMAL
FEV1-POST: NORMAL
FEV1-PRE: NORMAL
FEV1/FVC %PRED-POST: NORMAL
FEV1/FVC %PRED-PRE: NORMAL
FEV1/FVC PRED: NORMAL
FEV1/FVC-POST: NORMAL
FEV1/FVC-PRE: NORMAL
FVC %PRED-POST: NORMAL
FVC %PRED-PRE: NORMAL
FVC PRED: NORMAL
FVC-POST: NORMAL
FVC-PRE: NORMAL
GAW %PRED: NORMAL
GAW PRED: NORMAL
GAW: NORMAL
IC %PRED: NORMAL
IC PRED: NORMAL
IC: NORMAL
MEP: NORMAL
MIP: NORMAL
MVV %PRED-PRE: NORMAL
MVV PRED: NORMAL
MVV-PRE: NORMAL
PEF %PRED-POST: NORMAL
PEF %PRED-PRE: NORMAL
PEF PRED: NORMAL
PEF%CHNG: NORMAL
PEF-POST: NORMAL
PEF-PRE: NORMAL
RAW %PRED: NORMAL
RAW PRED: NORMAL
RAW: NORMAL
RV %PRED: NORMAL
RV PRED: NORMAL
RV: NORMAL
SVC %PRED: NORMAL
SVC PRED: NORMAL
SVC: NORMAL
TLC %PRED: NORMAL
TLC PRED: NORMAL
TLC: NORMAL
VA %PRED: NORMAL
VA PRED: NORMAL
VA: NORMAL
VTG %PRED: NORMAL
VTG PRED: NORMAL
VTG: NORMAL

## 2023-11-28 PROCEDURE — 94060 EVALUATION OF WHEEZING: CPT

## 2023-11-28 PROCEDURE — 94726 PLETHYSMOGRAPHY LUNG VOLUMES: CPT

## 2023-11-28 PROCEDURE — 6370000000 HC RX 637 (ALT 250 FOR IP): Performed by: NURSE PRACTITIONER

## 2023-11-28 PROCEDURE — 94729 DIFFUSING CAPACITY: CPT

## 2023-11-28 PROCEDURE — 71046 X-RAY EXAM CHEST 2 VIEWS: CPT

## 2023-11-28 RX ORDER — ALBUTEROL SULFATE 90 UG/1
2 AEROSOL, METERED RESPIRATORY (INHALATION) ONCE
Status: COMPLETED | OUTPATIENT
Start: 2023-11-28 | End: 2023-11-28

## 2023-11-28 RX ADMIN — ALBUTEROL SULFATE 2 PUFF: 90 AEROSOL, METERED RESPIRATORY (INHALATION) at 13:16

## 2024-01-04 ENCOUNTER — OFFICE VISIT (OUTPATIENT)
Dept: PRIMARY CARE CLINIC | Age: 54
End: 2024-01-04
Payer: MEDICARE

## 2024-01-04 VITALS
OXYGEN SATURATION: 94 % | WEIGHT: 293 LBS | DIASTOLIC BLOOD PRESSURE: 87 MMHG | BODY MASS INDEX: 55.73 KG/M2 | HEART RATE: 85 BPM | SYSTOLIC BLOOD PRESSURE: 146 MMHG

## 2024-01-04 DIAGNOSIS — J45.40 MODERATE PERSISTENT ASTHMA WITHOUT COMPLICATION: ICD-10-CM

## 2024-01-04 DIAGNOSIS — M17.0 PRIMARY OSTEOARTHRITIS OF BOTH KNEES: ICD-10-CM

## 2024-01-04 DIAGNOSIS — R73.03 PREDIABETES: Primary | ICD-10-CM

## 2024-01-04 DIAGNOSIS — Z23 NEED FOR VACCINATION: ICD-10-CM

## 2024-01-04 DIAGNOSIS — Z13.220 LIPID SCREENING: ICD-10-CM

## 2024-01-04 DIAGNOSIS — J44.9 CHRONIC OBSTRUCTIVE PULMONARY DISEASE, UNSPECIFIED COPD TYPE (HCC): ICD-10-CM

## 2024-01-04 DIAGNOSIS — I10 BENIGN ESSENTIAL HTN: ICD-10-CM

## 2024-01-04 PROBLEM — F41.1 GENERALIZED ANXIETY DISORDER: Status: ACTIVE | Noted: 2022-09-28

## 2024-01-04 PROBLEM — F31.9 BIPOLAR DISORDER (HCC): Status: ACTIVE | Noted: 2022-09-28

## 2024-01-04 LAB — HBA1C MFR BLD: 6.4 %

## 2024-01-04 PROCEDURE — G0009 ADMIN PNEUMOCOCCAL VACCINE: HCPCS | Performed by: NURSE PRACTITIONER

## 2024-01-04 PROCEDURE — 3077F SYST BP >= 140 MM HG: CPT | Performed by: NURSE PRACTITIONER

## 2024-01-04 PROCEDURE — 83036 HEMOGLOBIN GLYCOSYLATED A1C: CPT | Performed by: NURSE PRACTITIONER

## 2024-01-04 PROCEDURE — 3079F DIAST BP 80-89 MM HG: CPT | Performed by: NURSE PRACTITIONER

## 2024-01-04 PROCEDURE — 90677 PCV20 VACCINE IM: CPT | Performed by: NURSE PRACTITIONER

## 2024-01-04 PROCEDURE — 99214 OFFICE O/P EST MOD 30 MIN: CPT | Performed by: NURSE PRACTITIONER

## 2024-01-04 RX ORDER — ALBUTEROL SULFATE 90 UG/1
AEROSOL, METERED RESPIRATORY (INHALATION)
Qty: 8.5 G | Refills: 2 | OUTPATIENT
Start: 2024-01-04

## 2024-01-04 ASSESSMENT — PATIENT HEALTH QUESTIONNAIRE - PHQ9
4. FEELING TIRED OR HAVING LITTLE ENERGY: 0
SUM OF ALL RESPONSES TO PHQ QUESTIONS 1-9: 4
2. FEELING DOWN, DEPRESSED OR HOPELESS: 1
SUM OF ALL RESPONSES TO PHQ9 QUESTIONS 1 & 2: 1
SUM OF ALL RESPONSES TO PHQ QUESTIONS 1-9: 4
8. MOVING OR SPEAKING SO SLOWLY THAT OTHER PEOPLE COULD HAVE NOTICED. OR THE OPPOSITE, BEING SO FIGETY OR RESTLESS THAT YOU HAVE BEEN MOVING AROUND A LOT MORE THAN USUAL: 0
10. IF YOU CHECKED OFF ANY PROBLEMS, HOW DIFFICULT HAVE THESE PROBLEMS MADE IT FOR YOU TO DO YOUR WORK, TAKE CARE OF THINGS AT HOME, OR GET ALONG WITH OTHER PEOPLE: 1
9. THOUGHTS THAT YOU WOULD BE BETTER OFF DEAD, OR OF HURTING YOURSELF: 0
3. TROUBLE FALLING OR STAYING ASLEEP: 3
6. FEELING BAD ABOUT YOURSELF - OR THAT YOU ARE A FAILURE OR HAVE LET YOURSELF OR YOUR FAMILY DOWN: 0
SUM OF ALL RESPONSES TO PHQ QUESTIONS 1-9: 4
SUM OF ALL RESPONSES TO PHQ QUESTIONS 1-9: 4
7. TROUBLE CONCENTRATING ON THINGS, SUCH AS READING THE NEWSPAPER OR WATCHING TELEVISION: 0
1. LITTLE INTEREST OR PLEASURE IN DOING THINGS: 0
5. POOR APPETITE OR OVEREATING: 0

## 2024-01-04 NOTE — PROGRESS NOTES
K 4.2 12/23/2021 02:09 PM     12/23/2021 02:09 PM    CO2 21 12/23/2021 02:09 PM    BUN 15 12/23/2021 02:09 PM    CREATININE 0.70 12/23/2021 02:09 PM    GLUCOSE 113 05/27/2022 11:55 AM       HEMOGLOBIN A1C:   Lab Results   Component Value Date/Time    LABA1C 6.4 01/04/2024 11:05 AM       FASTING LIPID PANEL:  Lab Results   Component Value Date    CHOL 172 04/11/2019    HDL 52 04/11/2019    TRIG 294 04/11/2019       Physical Exam  Constitutional:       Appearance: Normal appearance. She is morbidly obese. She is not toxic-appearing.   HENT:      Head: Normocephalic.      Right Ear: External ear normal.      Left Ear: External ear normal.      Nose: Nose normal.      Mouth/Throat:      Mouth: Mucous membranes are moist.   Eyes:      General: No scleral icterus.        Right eye: No discharge.         Left eye: No discharge.      Conjunctiva/sclera: Conjunctivae normal.   Cardiovascular:      Rate and Rhythm: Normal rate.      Heart sounds: No murmur heard.  Pulmonary:      Effort: Pulmonary effort is normal.   Musculoskeletal:      Cervical back: Normal range of motion.   Skin:     Coloration: Skin is not jaundiced.   Neurological:      Mental Status: She is alert and oriented to person, place, and time.   Psychiatric:         Mood and Affect: Mood normal.         Behavior: Behavior normal.         Thought Content: Thought content normal.            An electronic signature was used to authenticate this note.    --Mayela Sadler, APRN - CNP

## 2024-01-24 DIAGNOSIS — J45.40 MODERATE PERSISTENT ASTHMA WITHOUT COMPLICATION: ICD-10-CM

## 2024-01-24 DIAGNOSIS — J44.9 CHRONIC OBSTRUCTIVE PULMONARY DISEASE, UNSPECIFIED COPD TYPE (HCC): ICD-10-CM

## 2024-01-24 RX ORDER — FLUTICASONE FUROATE, UMECLIDINIUM BROMIDE AND VILANTEROL TRIFENATATE 200; 62.5; 25 UG/1; UG/1; UG/1
POWDER RESPIRATORY (INHALATION)
Qty: 1 EACH | Refills: 4 | Status: SHIPPED | OUTPATIENT
Start: 2024-01-24

## 2024-02-15 ENCOUNTER — TELEMEDICINE (OUTPATIENT)
Dept: PRIMARY CARE CLINIC | Age: 54
End: 2024-02-15
Payer: MEDICARE

## 2024-02-15 DIAGNOSIS — R73.03 PREDIABETES: Primary | ICD-10-CM

## 2024-02-15 PROCEDURE — 99213 OFFICE O/P EST LOW 20 MIN: CPT | Performed by: NURSE PRACTITIONER

## 2024-02-15 RX ORDER — TIRZEPATIDE 5 MG/.5ML
5 INJECTION, SOLUTION SUBCUTANEOUS WEEKLY
Qty: 4 ADJUSTABLE DOSE PRE-FILLED PEN SYRINGE | Refills: 1 | Status: SHIPPED | OUTPATIENT
Start: 2024-02-15

## 2024-02-15 RX ORDER — METFORMIN HYDROCHLORIDE 500 MG/1
500 TABLET, EXTENDED RELEASE ORAL
Qty: 90 TABLET | Refills: 1 | Status: SHIPPED | OUTPATIENT
Start: 2024-02-15

## 2024-02-15 NOTE — PROGRESS NOTES
Janay Morgan is a 53 y.o. female evaluated virtual visit via [email or web address] on 2/15/2024.        Janay Morgan was evaluated through a synchronous (real-time) audio encounter. Patient identification was verified at the start of the visit. She (or guardian if applicable) is aware that this is a billable service, which includes applicable co-pays. This visit was conducted with the patient's (and/or legal guardian's) verbal consent. She has not had a related appointment within my department in the past 7 days or scheduled within the next 24 hours.   The patient was located at Home: [address] [place] [address].  The provider [facility] [address] [place] [address].    Note: not billable if this call serves to triage the patient into an appointment for the relevant concern    Janay Morgan is a 53 y.o. female evaluated via telephone on 2/15/2024 for Follow-up (Patient is here today for a 6 week follow up )  .        LAWRENCE Oliva CNP       Consent:  She and/or health care decision maker is aware that that she may receive a bill for this telephone service, depending on her insurance coverage, and has provided verbal consent to proceed: Yes      Documentation:  I communicated with the patient and/or health care decision maker about prediabetes.   Details of this discussion including any medical advice provided below      I affirm this is a Patient Initiated Episode with an Established Patient who has not had a related appointment within my department in the past 7 days or scheduled within the next 24 hours.    Total Time: minutes: 11-20 minutes    Note: not billable if this call serves to triage the patient into an appointment for the relevant concern      LAWRENCE Oliva CNP                  2/15/2024    TELEHEALTH EVALUATION -- Audio/Visual (During COVID-19 public health emergency)    Patient and physician are located in their  Sunscreen Recommendations: SPF 50 or higher, applied daily or hourly when heavy sun exposure is anticipated Detail Level: Zone Nicotinamide Supplementation Recommendations: Nicotinamide is purchased over-the-counter in 500 mg capsules.  Nicotinamide should be taken as one 500 mg capsule twice a day. Supplementation with Nicotinamide does not replace sunscreen application. Detail Level: Detailed

## 2024-03-05 ENCOUNTER — OFFICE VISIT (OUTPATIENT)
Dept: PULMONOLOGY | Age: 54
End: 2024-03-05

## 2024-03-05 VITALS
WEIGHT: 293 LBS | OXYGEN SATURATION: 94 % | HEART RATE: 88 BPM | SYSTOLIC BLOOD PRESSURE: 138 MMHG | RESPIRATION RATE: 16 BRPM | BODY MASS INDEX: 45.99 KG/M2 | HEIGHT: 67 IN | DIASTOLIC BLOOD PRESSURE: 83 MMHG

## 2024-03-05 DIAGNOSIS — G47.33 OSA (OBSTRUCTIVE SLEEP APNEA): ICD-10-CM

## 2024-03-05 DIAGNOSIS — J45.40 MODERATE PERSISTENT ASTHMA WITHOUT COMPLICATION: ICD-10-CM

## 2024-03-05 DIAGNOSIS — J44.9 CHRONIC OBSTRUCTIVE PULMONARY DISEASE, UNSPECIFIED COPD TYPE (HCC): ICD-10-CM

## 2024-03-05 DIAGNOSIS — I10 BENIGN ESSENTIAL HTN: ICD-10-CM

## 2024-03-05 DIAGNOSIS — R91.8 MULTIPLE NODULES OF LUNG: ICD-10-CM

## 2024-03-05 DIAGNOSIS — Z87.891 PERSONAL HISTORY OF TOBACCO USE: Primary | ICD-10-CM

## 2024-03-05 DIAGNOSIS — E66.01 MORBID OBESITY DUE TO EXCESS CALORIES (HCC): ICD-10-CM

## 2024-03-05 RX ORDER — FLUTICASONE FUROATE, UMECLIDINIUM BROMIDE AND VILANTEROL TRIFENATATE 100; 62.5; 25 UG/1; UG/1; UG/1
1 POWDER RESPIRATORY (INHALATION) DAILY
Qty: 1 EACH | Refills: 5 | Status: SHIPPED | OUTPATIENT
Start: 2024-03-05

## 2024-03-05 NOTE — PROGRESS NOTES
PULMONARY  CONSULTATION        REFERRED BY: Mayela Sadler APRN - CNP    REASON FOR CONSULTATION: COPD/bronchial asthma/ALLYSON    HISTORY OF PRESENT ILLNESS:    Janay Morgan is a 53 y.o. year old female here for evaluation of above problems  Patient has had bronchial asthma since her childhood.  She was diagnosed with COPD in 2010.  She has had multiple hospitalizations for obstructive airway disease.  Her last hospitalization was more than 1 year ago.  She denied having any intubation in the past  Currently denies any shortness of breath or wheezing  No cough or sputum production  No hemoptysis  No orthopnea, PND or increasing pedal edema  No chest pain or pressure  Patient's albuterol need is rare.  Had asthma education in the past  Patient has been diagnosed with sleep apnea in 2016 at [facility]  Patient has daytime sleepiness and fatigue and tiredness  Does not use any CPAP or BiPAP therapy currently  No hypothyroidism  No symptoms of narcolepsy  No motor vehicle accidents        PAST MEDICAL HISTORY:       Diagnosis Date    Acute exacerbation of COPD with asthma ([facility]) 6/7/16    Anxiety     Asthma     Benign essential HTN     Bipolar 1 disorder ([facility])     COPD (chronic obstructive pulmonary disease) ([facility])     Depression     Fibromyalgia     Headache(784.0)     Hip arthritis 11/12/2019    Hyperlipidemia 10/18/2019    Obesity     Pneumonia due to infectious organism 6/7/2016    Post traumatic stress disorder     Type 2 diabetes mellitus without complications ([facility]) 9/1/2022       SURGICAL HISTORY:    Past Surgical History:   Procedure Laterality Date    ANESTHESIA NERVE BLOCK N/A 1/9/2020    NERVE BLOCK BILATERAL - KNEE performed by Angelo Comer MD at [facility] OR    CHOLECYSTECTOMY      HC INJECT OTHER PERPHRL NERV Bilateral 12/5/2019    INJECTION MEDICATION - HIP performed by Angelo Comer MD at [facility] OR    NERVE BLOCK Bilateral 01/09/2020    knees     TONSILLECTOMY

## 2024-03-05 NOTE — PROGRESS NOTES
[facility] SPECIAL[facility]  [address] [place] [address]  Dept: [phone]  Dept Fax: [phone]      3/5/24    Patient: Elizabeth Agosto  YOB: 1970    Dear Mayela Sadler APRN - CNP,    I had the pleasure of seeing one of your patients, ELIZABETH AGOSTO today in the office today.  Please find attached my note with the assessment and plan of care.  Thank you for allowing me to participate in the care of this patient.  I will keep you updated on this patient's follow up and I look forward to serving you and your patients again in the future.    Yonis Padgett MD  3/5/2024 1:05 PM

## 2024-03-15 ENCOUNTER — TELEPHONE (OUTPATIENT)
Dept: PULMONOLOGY | Age: 54
End: 2024-03-15

## 2024-03-15 NOTE — TELEPHONE ENCOUNTER
I called patient, left a message for her to schedule the CT lung screening. Per my apt notes, the patient wanted to schedule the Ct herself.

## 2024-05-16 ENCOUNTER — TELEPHONE (OUTPATIENT)
Dept: PRIMARY CARE CLINIC | Age: 54
End: 2024-05-16

## 2024-05-30 ENCOUNTER — PATIENT MESSAGE (OUTPATIENT)
Dept: PRIMARY CARE CLINIC | Age: 54
End: 2024-05-30

## 2024-05-30 DIAGNOSIS — R73.03 PREDIABETES: ICD-10-CM

## 2024-05-31 RX ORDER — SEMAGLUTIDE 1.34 MG/ML
INJECTION, SOLUTION SUBCUTANEOUS
Qty: 3 ADJUSTABLE DOSE PRE-FILLED PEN SYRINGE | Refills: 1 | Status: SHIPPED | OUTPATIENT
Start: 2024-05-31 | End: 2024-08-27

## 2024-05-31 NOTE — TELEPHONE ENCOUNTER
From: Janay Morgan  To: Mayela Sadler  Sent: 5/30/2024 2:29 PM EDT  Subject: Mounjaro shortage    I just was informed by my new pharmacy that they have Ozempic in stock. Can you please send a prescription to [facility]?    Thank you

## 2024-06-22 SDOH — ECONOMIC STABILITY: FOOD INSECURITY: WITHIN THE PAST 12 MONTHS, THE FOOD YOU BOUGHT JUST DIDN'T LAST AND YOU DIDN'T HAVE MONEY TO GET MORE.: PATIENT DECLINED

## 2024-06-22 SDOH — ECONOMIC STABILITY: FOOD INSECURITY: WITHIN THE PAST 12 MONTHS, YOU WORRIED THAT YOUR FOOD WOULD RUN OUT BEFORE YOU GOT MONEY TO BUY MORE.: PATIENT DECLINED

## 2024-06-22 SDOH — ECONOMIC STABILITY: INCOME INSECURITY: HOW HARD IS IT FOR YOU TO PAY FOR THE VERY BASICS LIKE FOOD, HOUSING, MEDICAL CARE, AND HEATING?: PATIENT DECLINED

## 2024-06-22 SDOH — ECONOMIC STABILITY: HOUSING INSECURITY
IN THE LAST 12 MONTHS, WAS THERE A TIME WHEN YOU DID NOT HAVE A STEADY PLACE TO SLEEP OR SLEPT IN A SHELTER (INCLUDING NOW)?: PATIENT DECLINED

## 2024-06-25 ENCOUNTER — OFFICE VISIT (OUTPATIENT)
Dept: PRIMARY CARE CLINIC | Age: 54
End: 2024-06-25
Payer: MEDICARE

## 2024-06-25 ENCOUNTER — HOSPITAL ENCOUNTER (OUTPATIENT)
Age: 54
Discharge: HOME | End: 2024-06-25
Payer: MEDICARE

## 2024-06-25 VITALS
OXYGEN SATURATION: 100 % | DIASTOLIC BLOOD PRESSURE: 83 MMHG | BODY MASS INDEX: 49.02 KG/M2 | HEART RATE: 97 BPM | WEIGHT: 293 LBS | SYSTOLIC BLOOD PRESSURE: 126 MMHG

## 2024-06-25 DIAGNOSIS — E66.01 MORBID OBESITY WITH BMI OF 45.0-49.9, ADULT (HCC): ICD-10-CM

## 2024-06-25 DIAGNOSIS — I10 BENIGN ESSENTIAL HTN: ICD-10-CM

## 2024-06-25 DIAGNOSIS — R73.03 PREDIABETES: ICD-10-CM

## 2024-06-25 DIAGNOSIS — M72.2 PLANTAR FASCIITIS, BILATERAL: Primary | ICD-10-CM

## 2024-06-25 LAB
ALBUMIN SERPL-MCNC: 4.5 G/DL (ref 3.5–5.2)
ALBUMIN/GLOB SERPL: 2 {RATIO} (ref 1–2.5)
ALP SERPL-CCNC: 75 U/L (ref 35–104)
ALT SERPL-CCNC: 11 U/L (ref 10–35)
ANION GAP SERPL CALCULATED.3IONS-SCNC: 8 MMOL/L (ref 9–16)
AST SERPL-CCNC: 28 U/L (ref 10–35)
BILIRUB SERPL-MCNC: 0.4 MG/DL (ref 0–1.2)
BUN SERPL-MCNC: 12 MG/DL (ref 6–20)
CALCIUM SERPL-MCNC: 9.3 MG/DL (ref 8.6–10.4)
CHLORIDE SERPL-SCNC: 105 MMOL/L (ref 98–107)
CHOLEST SERPL-MCNC: 204 MG/DL (ref 0–199)
CHOLESTEROL/HDL RATIO: 3
CO2 SERPL-SCNC: 26 MMOL/L (ref 20–31)
CREAT SERPL-MCNC: 0.6 MG/DL (ref 0.5–0.9)
EST. AVERAGE GLUCOSE BLD GHB EST-MCNC: 103 MG/DL
GFR, ESTIMATED: >90 ML/MIN/1.73M2
GLUCOSE SERPL-MCNC: 104 MG/DL (ref 74–99)
HBA1C MFR BLD: 5.2 % (ref 4–6)
HDLC SERPL-MCNC: 62 MG/DL
LDLC SERPL CALC-MCNC: 111 MG/DL (ref 0–100)
POTASSIUM SERPL-SCNC: 4.6 MMOL/L (ref 3.7–5.3)
PROT SERPL-MCNC: 7 G/DL (ref 6.6–8.7)
SODIUM SERPL-SCNC: 139 MMOL/L (ref 136–145)
TRIGL SERPL-MCNC: 160 MG/DL (ref 0–149)
VLDLC SERPL CALC-MCNC: 32 MG/DL

## 2024-06-25 PROCEDURE — 3079F DIAST BP 80-89 MM HG: CPT | Performed by: NURSE PRACTITIONER

## 2024-06-25 PROCEDURE — 80053 COMPREHEN METABOLIC PANEL: CPT

## 2024-06-25 PROCEDURE — 99214 OFFICE O/P EST MOD 30 MIN: CPT | Performed by: NURSE PRACTITIONER

## 2024-06-25 PROCEDURE — 83036 HEMOGLOBIN GLYCOSYLATED A1C: CPT

## 2024-06-25 PROCEDURE — 36415 COLL VENOUS BLD VENIPUNCTURE: CPT

## 2024-06-25 PROCEDURE — 3074F SYST BP LT 130 MM HG: CPT | Performed by: NURSE PRACTITIONER

## 2024-06-25 PROCEDURE — 80061 LIPID PANEL: CPT

## 2024-06-25 RX ORDER — TIRZEPATIDE 5 MG/.5ML
5 INJECTION, SOLUTION SUBCUTANEOUS WEEKLY
Qty: 4 ADJUSTABLE DOSE PRE-FILLED PEN SYRINGE | Refills: 1 | Status: SHIPPED | OUTPATIENT
Start: 2024-06-25

## 2024-06-25 RX ORDER — IBUPROFEN 600 MG/1
600 TABLET ORAL 3 TIMES DAILY PRN
Qty: 30 TABLET | Refills: 0 | Status: SHIPPED | OUTPATIENT
Start: 2024-06-25

## 2024-06-25 NOTE — PROGRESS NOTES
[facility]  [facility] PRIMARY CARE  [facility] CARE [place] MAIN FLOOR  [facility] [address]  Dept: [phone]  Dept Fax: [phone]    Janay Morgan (:  1970) is a 53 y.o. female,Established patient, here for evaluation of the following chief complaint(s):  Ankle Pain (Ankle , pain )      Assessment & Plan   ASSESSMENT/PLAN:  1. Prediabetes  -     Tirzepatide (MOUNJARO) 5 MG/0.5ML SOPN SC injection; Inject 0.5 mLs into the skin once a week, Disp-4 Adjustable Dose Pre-filled Pen Syringe, R-1Normal  -     Hemoglobin A1C; Future  2. Body mass index (BMI) 50.0-59.9, adult ([facility])  3. Plantar fasciitis, bilateral  -     [facility] Carissa Doran DPM, Podiatry, [place]  -     ibuprofen (ADVIL;MOTRIN) 600 MG tablet; Take 1 tablet by mouth 3 times daily as needed for Pain, Disp-30 tablet, R-0Normal  4. Morbid obesity with BMI of 45.0-49.9, adult ([facility])  -     Tirzepatide (MOUNJARO) 5 MG/0.5ML SOPN SC injection; Inject 0.5 mLs into the skin once a week, Disp-4 Adjustable Dose Pre-filled Pen Syringe, R-1Normal    Acute bilateral ankle and foot pain, appears consistent with an Achilles tendinitis overlapping with plantar fasciitis.  Home exercises provided today along with a course of NSAIDs.  Exam patient does appear to have low-lying arches, patient is agreeable to meeting with podiatry for evaluation of possible orthotic use or need.  The patient is to continue weight loss with dietary modification and use of GLP-1, down 25 pounds from last visit.  She has had trouble accessing her injectable over the last month.  Using the medication plus metformin for prediabetes, due for repeat A1c    No follow-ups on file.         Subjective   SUBJECTIVE/OBJECTIVE:  Chronic ankle pain. Both ankles. Started getting worse the last week after picking up more hours at work. She stands on her feet 36 hours a week.  Both ankles can swell by the end of the day.  + morning pain, + morning

## 2024-07-08 DIAGNOSIS — J44.9 CHRONIC OBSTRUCTIVE PULMONARY DISEASE, UNSPECIFIED COPD TYPE (HCC): ICD-10-CM

## 2024-07-08 DIAGNOSIS — J45.40 MODERATE PERSISTENT ASTHMA WITHOUT COMPLICATION: ICD-10-CM

## 2024-07-09 RX ORDER — FLUTICASONE FUROATE, UMECLIDINIUM BROMIDE AND VILANTEROL TRIFENATATE 200; 62.5; 25 UG/1; UG/1; UG/1
POWDER RESPIRATORY (INHALATION)
Qty: 1 EACH | Refills: 0 | Status: SHIPPED | OUTPATIENT
Start: 2024-07-09

## 2024-07-22 ENCOUNTER — OFFICE VISIT (OUTPATIENT)
Dept: PODIATRY | Age: 54
End: 2024-07-22

## 2024-07-22 VITALS — HEIGHT: 67 IN | WEIGHT: 293 LBS | BODY MASS INDEX: 45.99 KG/M2

## 2024-07-22 DIAGNOSIS — M76.829 PTTD (POSTERIOR TIBIAL TENDON DYSFUNCTION): ICD-10-CM

## 2024-07-22 DIAGNOSIS — M25.572 ACUTE LEFT ANKLE PAIN: Primary | ICD-10-CM

## 2024-07-22 RX ORDER — IBUPROFEN 600 MG/1
600 TABLET ORAL 3 TIMES DAILY PRN
Qty: 30 TABLET | Refills: 0 | Status: SHIPPED | OUTPATIENT
Start: 2024-07-22

## 2024-07-27 NOTE — PROGRESS NOTES
as needed for Pain 7/22/24  Yes Carissa Doran, DARI   fluticasone-umeclidin-vilant (TRELEGY ELLIPTA) 200-62.5-25 MCG/ACT AEPB inhaler inhale 1 puff by mouth and INTO THE LUNGS daily 7/9/24  Yes Sonia Flower APRN - CNP   Tirzepatide (MOUNJARO) 5 MG/0.5ML SOPN SC injection Inject 0.5 mLs into the skin once a week 6/25/24  Yes Mayela Sadler APRN - CNP   ibuprofen (ADVIL;MOTRIN) 600 MG tablet Take 1 tablet by mouth 3 times daily as needed for Pain 6/25/24  Yes Mayela Sadler APRN - CNP   metFORMIN (GLUCOPHAGE-XR) 500 MG extended release tablet Take 1 tablet by mouth daily (with breakfast) 2/15/24  Yes Mayela Sadler APRN - CNP   Handicap Placard MISC by Does not apply route Exp 5 years 1/4/24  Yes Mayela Sadler APRN - CNP   enalapril (VASOTEC) 10 MG tablet take 1 tablet by mouth once daily 11/20/23  Yes Mayela Sadler APRN - CNP   albuterol sulfate HFA (PROVENTIL;VENTOLIN;PROAIR) 108 (90 Base) MCG/ACT inhaler inhale 2 puffs by mouth and INTO THE LUNGS every 4 hours if needed for wheezing or shortness of breath 10/17/23  Yes Mayela Sadler APRN - CNP   albuterol (PROVENTIL) (2.5 MG/3ML) 0.083% nebulizer solution Take 3 mLs by nebulization every 4 hours as needed for Wheezing 5/5/23  Yes Mayela Sadler APRN - CNP   baclofen (LIORESAL) 20 MG tablet Take 1 tablet by mouth 3 times daily 7/6/22  Yes Yisel Chand MD   Blood Gluc Meter Disp-Strips (BLOOD GLUCOSE METER DISPOSABLE) KELLY Daily and as needed 4/22/22  Yes Mayela Sadler APRN - CNP   VRAYLAR 3 MG CAPS capsule take 1 capsule by mouth every morning 11/30/21  Yes Yisel Chand MD   gabapentin (NEURONTIN) 600 MG tablet Take 1 tablet by mouth 3 times daily.   Yes Provider, Historical, MD   venlafaxine (EFFEXOR XR) 150 MG extended release capsule  6/17/18  Yes ProviderYisel MD   ALPRAZolam (XANAX) 0.5 MG tablet Take 1 tablet by mouth nightly as needed for Sleep.   Yes Provider, MD Yisel   Lancets MISC 1 each by Does

## 2024-08-12 ENCOUNTER — OFFICE VISIT (OUTPATIENT)
Dept: PODIATRY | Age: 54
End: 2024-08-12
Payer: MEDICARE

## 2024-08-12 VITALS — BODY MASS INDEX: 45.99 KG/M2 | HEIGHT: 67 IN | WEIGHT: 293 LBS

## 2024-08-12 DIAGNOSIS — M76.829 PTTD (POSTERIOR TIBIAL TENDON DYSFUNCTION): Primary | ICD-10-CM

## 2024-08-12 PROCEDURE — 99213 OFFICE O/P EST LOW 20 MIN: CPT | Performed by: PODIATRIST

## 2024-08-12 NOTE — PROGRESS NOTES
[facility] PODIATRY [address]  [address]  [facility] [address]  Dept: [phone]  Dept Fax: [phone]    RETURN PATIENT PROGRESS NOTE  Date of patient's visit: 8/12/2024  Patient's Name:  Janay Morgan YOB: 1970            Patient Care Team:  Mayela Sadler APRN - CNP as PCP - General (Family Medicine)  Mayela Sadler APRN - CNP as PCP - Empaneled Provider  Atilio Colon MD as Consulting Physician (Gastroenterology)       Janay Burtonraw 53 y.o. female that presents for follow-up of   Chief Complaint   Patient presents with    Ankle Pain     Left ankle    Foot Pain     Left foot     Pt's primary care physician is Mayela Sadler APRN - CNP last seen 06/25/2024  Symptoms began 2 month(s) ago and are decreased .  Patient relates pain is Absent .  Pain is rated 0 out of 10 and is described as none.  Treatments prior to today's visit include: inserts/ orthotics.  Currently denies F/C/N/V.     Allergies   Allergen Reactions    Food Anaphylaxis     raisins    Penicillins Anaphylaxis    Parafon Forte Dsc [Chlorzoxazone] Hives    Biaxin [Clarithromycin] Rash       Past Medical History:   Diagnosis Date    Acute exacerbation of COPD with asthma ([facility]) 6/7/16    Anxiety     Asthma     Benign essential HTN     Bipolar 1 disorder ([facility])     COPD (chronic obstructive pulmonary disease) ([facility])     Depression     Fibromyalgia     Headache(784.0)     Hip arthritis 11/12/2019    Hyperlipidemia 10/18/2019    Obesity     Pneumonia due to infectious organism 6/7/2016    Post traumatic stress disorder     Type 2 diabetes mellitus without complications ([facility]) 9/1/2022       Prior to Admission medications    Medication Sig Start Date End Date Taking? Authorizing Provider   ibuprofen (ADVIL;MOTRIN) 600 MG tablet Take 1 tablet by mouth 3 times daily as needed for Pain 7/22/24  Yes Carissa Doran DPM   fluticasone-umeclidin-vilant (TRELEGY

## 2024-09-11 DIAGNOSIS — J44.9 CHRONIC OBSTRUCTIVE PULMONARY DISEASE, UNSPECIFIED COPD TYPE (HCC): ICD-10-CM

## 2024-09-11 DIAGNOSIS — J45.40 MODERATE PERSISTENT ASTHMA WITHOUT COMPLICATION: ICD-10-CM

## 2024-09-12 RX ORDER — FLUTICASONE FUROATE, UMECLIDINIUM BROMIDE AND VILANTEROL TRIFENATATE 200; 62.5; 25 UG/1; UG/1; UG/1
POWDER RESPIRATORY (INHALATION)
Qty: 60 EACH | Refills: 3 | Status: SHIPPED | OUTPATIENT
Start: 2024-09-12

## 2024-09-30 ENCOUNTER — OFFICE VISIT (OUTPATIENT)
Dept: PRIMARY CARE CLINIC | Age: 54
End: 2024-09-30
Payer: MEDICARE

## 2024-09-30 VITALS
WEIGHT: 293 LBS | OXYGEN SATURATION: 96 % | HEART RATE: 77 BPM | BODY MASS INDEX: 49.18 KG/M2 | TEMPERATURE: 97.9 F | SYSTOLIC BLOOD PRESSURE: 139 MMHG | DIASTOLIC BLOOD PRESSURE: 93 MMHG

## 2024-09-30 DIAGNOSIS — I10 HYPERTENSION, UNSPECIFIED TYPE: ICD-10-CM

## 2024-09-30 DIAGNOSIS — J44.9 CHRONIC OBSTRUCTIVE PULMONARY DISEASE, UNSPECIFIED COPD TYPE (HCC): Primary | ICD-10-CM

## 2024-09-30 DIAGNOSIS — E66.01 MORBID OBESITY WITH BMI OF 45.0-49.9, ADULT: ICD-10-CM

## 2024-09-30 DIAGNOSIS — I10 BENIGN ESSENTIAL HTN: ICD-10-CM

## 2024-09-30 DIAGNOSIS — R73.03 PREDIABETES: ICD-10-CM

## 2024-09-30 DIAGNOSIS — J45.40 MODERATE PERSISTENT ASTHMA WITHOUT COMPLICATION: ICD-10-CM

## 2024-09-30 DIAGNOSIS — Z23 NEED FOR INFLUENZA VACCINATION: ICD-10-CM

## 2024-09-30 LAB — HBA1C MFR BLD: 5.6 %

## 2024-09-30 PROCEDURE — 3080F DIAST BP >= 90 MM HG: CPT | Performed by: NURSE PRACTITIONER

## 2024-09-30 PROCEDURE — 99214 OFFICE O/P EST MOD 30 MIN: CPT | Performed by: NURSE PRACTITIONER

## 2024-09-30 PROCEDURE — G0008 ADMIN INFLUENZA VIRUS VAC: HCPCS | Performed by: NURSE PRACTITIONER

## 2024-09-30 PROCEDURE — 3075F SYST BP GE 130 - 139MM HG: CPT | Performed by: NURSE PRACTITIONER

## 2024-09-30 PROCEDURE — 83036 HEMOGLOBIN GLYCOSYLATED A1C: CPT | Performed by: NURSE PRACTITIONER

## 2024-09-30 PROCEDURE — 90661 CCIIV3 VAC ABX FR 0.5 ML IM: CPT | Performed by: NURSE PRACTITIONER

## 2024-09-30 RX ORDER — CARIPRAZINE 4.5 MG/1
4.5 CAPSULE, GELATIN COATED ORAL EVERY MORNING
COMMUNITY
Start: 2024-08-29 | End: 2025-09-30

## 2024-09-30 RX ORDER — ALBUTEROL SULFATE 0.83 MG/ML
2.5 SOLUTION RESPIRATORY (INHALATION) EVERY 4 HOURS PRN
Qty: 120 EACH | Refills: 1 | Status: SHIPPED | OUTPATIENT
Start: 2024-09-30

## 2024-09-30 RX ORDER — ENALAPRIL MALEATE 10 MG/1
10 TABLET ORAL DAILY
Qty: 100 TABLET | Refills: 1 | Status: SHIPPED | OUTPATIENT
Start: 2024-09-30

## 2024-09-30 RX ORDER — ALBUTEROL SULFATE 90 UG/1
INHALANT RESPIRATORY (INHALATION)
Qty: 8.5 G | Refills: 2 | Status: SHIPPED | OUTPATIENT
Start: 2024-09-30

## 2024-09-30 NOTE — PROGRESS NOTES
normal.      Nose: Nose normal.      Mouth/Throat:      Mouth: Mucous membranes are moist.   Eyes:      General: No scleral icterus.        Right eye: No discharge.         Left eye: No discharge.      Conjunctiva/sclera: Conjunctivae normal.   Cardiovascular:      Rate and Rhythm: Normal rate.      Heart sounds: No murmur heard.  Pulmonary:      Effort: Pulmonary effort is normal.   Musculoskeletal:      Cervical back: Normal range of motion.   Skin:     Coloration: Skin is not jaundiced.   Neurological:      Mental Status: She is alert and oriented to person, place, and time.   Psychiatric:         Mood and Affect: Mood normal.         Behavior: Behavior normal.         Thought Content: Thought content normal.                  An electronic signature was used to authenticate this note.    --Mayela Sadler, LAWR[place] - CNP

## 2024-09-30 NOTE — ASSESSMENT & PLAN NOTE
Chronic, at goal (stable), continue current treatment plan    Orders:    albuterol sulfate HFA (PROVENTIL;VENTOLIN;PROAIR) 108 (90 Base) MCG/ACT inhaler; inhale 2 puffs by mouth and INTO THE LUNGS every 4 hours if needed for wheezing or shortness of breath

## 2024-09-30 NOTE — ASSESSMENT & PLAN NOTE
Chronic, at goal (stable), patient reports normal ambulatory home monitoring numbers.  Continue current medication

## 2024-11-18 ENCOUNTER — TELEPHONE (OUTPATIENT)
Dept: PULMONOLOGY | Age: 54
End: 2024-11-18

## 2024-11-18 NOTE — TELEPHONE ENCOUNTER
Patient called requesting refill for Trelegy 100mg strength inhaler. She states she had called before for this refill but since Trelegy 200mg inhaler is prescribed by Mayela Sadler CNP, the 100mg was not on her active medication list. Patient states she uses different strengths of Trelegy inhalers based upon seasonal changes and that Dr. Padgett added the 100mg Trelegy on 3/5/24 and she would like refills ordered.   Her next appointment is on 4/1/25 in the [facility]. Please advise.

## 2024-11-19 RX ORDER — FLUTICASONE FUROATE, UMECLIDINIUM BROMIDE AND VILANTEROL TRIFENATATE 100; 62.5; 25 UG/1; UG/1; UG/1
1 POWDER RESPIRATORY (INHALATION) DAILY
Qty: 1 EACH | Refills: 5 | Status: SHIPPED | OUTPATIENT
Start: 2024-11-19

## 2024-12-24 ENCOUNTER — TELEPHONE (OUTPATIENT)
Dept: PRIMARY CARE CLINIC | Age: 54
End: 2024-12-24

## 2024-12-24 DIAGNOSIS — J32.0 MAXILLARY SINUSITIS, UNSPECIFIED CHRONICITY: ICD-10-CM

## 2024-12-24 DIAGNOSIS — U07.1 COVID-19: Primary | ICD-10-CM

## 2024-12-24 RX ORDER — PREDNISONE 20 MG/1
20 TABLET ORAL DAILY
Qty: 5 TABLET | Refills: 0 | Status: SHIPPED | OUTPATIENT
Start: 2024-12-24 | End: 2024-12-29

## 2024-12-24 RX ORDER — DEXTROMETHORPHAN HYDROBROMIDE AND PROMETHAZINE HYDROCHLORIDE 15; 6.25 MG/5ML; MG/5ML
5 SYRUP ORAL 4 TIMES DAILY PRN
Qty: 240 ML | Refills: 0 | Status: SHIPPED | OUTPATIENT
Start: 2024-12-24 | End: 2024-12-31

## 2024-12-24 NOTE — TELEPHONE ENCOUNTER
Pt called in stating she tested positive for COVID this morning.. She has a cough w/ phlegm, sinus congestion w/ drainage and a temp of 102. Please advise.

## 2024-12-24 NOTE — TELEPHONE ENCOUNTER
Please advise the patient that I sent a cough syrup, 5-day course of prednisone and Paxlovid over to her pharmacy to help alleviate the severity of symptoms from the COVID-19 in the presence of her asthma and COPD

## 2025-02-04 DIAGNOSIS — J44.9 CHRONIC OBSTRUCTIVE PULMONARY DISEASE, UNSPECIFIED COPD TYPE (HCC): ICD-10-CM

## 2025-02-04 DIAGNOSIS — J45.40 MODERATE PERSISTENT ASTHMA WITHOUT COMPLICATION: ICD-10-CM

## 2025-02-05 RX ORDER — FLUTICASONE FUROATE, UMECLIDINIUM BROMIDE AND VILANTEROL TRIFENATATE 200; 62.5; 25 UG/1; UG/1; UG/1
POWDER RESPIRATORY (INHALATION)
Qty: 60 EACH | Refills: 3 | Status: SHIPPED | OUTPATIENT
Start: 2025-02-05

## 2025-02-05 RX ORDER — ALBUTEROL SULFATE 90 UG/1
INHALANT RESPIRATORY (INHALATION)
Qty: 8.5 G | Refills: 2 | Status: SHIPPED | OUTPATIENT
Start: 2025-02-05 | End: 2026-02-05

## 2025-07-17 DIAGNOSIS — J45.40 MODERATE PERSISTENT ASTHMA WITHOUT COMPLICATION: ICD-10-CM

## 2025-07-17 DIAGNOSIS — J44.9 CHRONIC OBSTRUCTIVE PULMONARY DISEASE, UNSPECIFIED COPD TYPE (HCC): ICD-10-CM

## 2025-07-17 RX ORDER — FLUTICASONE FUROATE, UMECLIDINIUM BROMIDE AND VILANTEROL TRIFENATATE 200; 62.5; 25 UG/1; UG/1; UG/1
POWDER RESPIRATORY (INHALATION)
OUTPATIENT
Start: 2025-07-17

## (undated) DEVICE — STANDARD HYPODERMIC NEEDLE,POLYPROPYLENE HUB: Brand: MONOJECT

## (undated) DEVICE — NEEDLE FLTR 18GA L1.5IN MEM THK5UM BLNT DISP

## (undated) DEVICE — MARKER,SKIN,WI/RULER AND LABELS: Brand: MEDLINE

## (undated) DEVICE — Z DISCONTINUED USE 2423037 APPLICATOR MEDICATED 3 CC CHLORHEXIDINE GLUC 2% CHLORAPREP

## (undated) DEVICE — CANNULA NSL AD L7FT O2 PRSS CRUSH RESIST TBNG M CONN AIRLFE

## (undated) DEVICE — TOWEL,OR,DSP,ST,BLUE,STD,6/PK,12PK/CS: Brand: MEDLINE

## (undated) DEVICE — SYRINGE, LUER LOCK, 10ML: Brand: MEDLINE

## (undated) DEVICE — TRAY NRV BLK SUPP CUST